# Patient Record
Sex: FEMALE | Race: WHITE | Employment: OTHER
[De-identification: names, ages, dates, MRNs, and addresses within clinical notes are randomized per-mention and may not be internally consistent; named-entity substitution may affect disease eponyms.]

---

## 2017-01-23 ENCOUNTER — OFFICE VISIT (OUTPATIENT)
Dept: PODIATRY | Facility: CLINIC | Age: 72
End: 2017-01-23

## 2017-01-23 VITALS
HEIGHT: 62 IN | BODY MASS INDEX: 27.42 KG/M2 | DIASTOLIC BLOOD PRESSURE: 69 MMHG | HEART RATE: 77 BPM | RESPIRATION RATE: 17 BRPM | WEIGHT: 149 LBS | SYSTOLIC BLOOD PRESSURE: 121 MMHG

## 2017-01-23 DIAGNOSIS — B35.1 DERMATOPHYTOSIS OF NAIL: ICD-10-CM

## 2017-01-23 DIAGNOSIS — I73.9 PVD (PERIPHERAL VASCULAR DISEASE) (HCC): ICD-10-CM

## 2017-01-23 DIAGNOSIS — I89.0 LYMPHEDEMA: ICD-10-CM

## 2017-01-23 DIAGNOSIS — F79 MENTAL RETARDATION: Primary | Chronic | ICD-10-CM

## 2017-01-23 PROCEDURE — 99213 OFFICE O/P EST LOW 20 MIN: CPT | Performed by: PODIATRIST

## 2017-01-23 PROCEDURE — 11721 DEBRIDE NAIL 6 OR MORE: CPT | Performed by: PODIATRIST

## 2017-02-16 RX ORDER — DIVALPROEX SODIUM 250 MG/1
TABLET, EXTENDED RELEASE ORAL
Qty: 28 TABLET | Refills: 0 | Status: SHIPPED | OUTPATIENT
Start: 2017-02-16 | End: 2017-02-24 | Stop reason: SDUPTHER

## 2017-02-17 ENCOUNTER — OFFICE VISIT (OUTPATIENT)
Dept: FAMILY MEDICINE CLINIC | Facility: CLINIC | Age: 72
End: 2017-02-17

## 2017-02-17 VITALS
DIASTOLIC BLOOD PRESSURE: 68 MMHG | HEART RATE: 58 BPM | SYSTOLIC BLOOD PRESSURE: 128 MMHG | WEIGHT: 125 LBS | BODY MASS INDEX: 22.86 KG/M2 | OXYGEN SATURATION: 98 %

## 2017-02-17 DIAGNOSIS — G40.909 SEIZURE DISORDER (HCC): Primary | Chronic | ICD-10-CM

## 2017-02-17 DIAGNOSIS — R63.4 WEIGHT LOSS: ICD-10-CM

## 2017-02-17 DIAGNOSIS — M15.9 PRIMARY OSTEOARTHRITIS INVOLVING MULTIPLE JOINTS: ICD-10-CM

## 2017-02-17 PROCEDURE — 99213 OFFICE O/P EST LOW 20 MIN: CPT | Performed by: FAMILY MEDICINE

## 2017-02-17 ASSESSMENT — ENCOUNTER SYMPTOMS
BACK PAIN: 0
VOMITING: 0
DIARRHEA: 0
COUGH: 0
SHORTNESS OF BREATH: 0
SORE THROAT: 0
SINUS PRESSURE: 0
NAUSEA: 0

## 2017-02-24 RX ORDER — BUMETANIDE 1 MG/1
TABLET ORAL
Qty: 30 TABLET | Refills: 11 | Status: SHIPPED | OUTPATIENT
Start: 2017-02-24 | End: 2018-01-25 | Stop reason: SDUPTHER

## 2017-03-02 RX ORDER — DIVALPROEX SODIUM 250 MG/1
TABLET, EXTENDED RELEASE ORAL
Qty: 28 TABLET | Refills: 0 | Status: SHIPPED | OUTPATIENT
Start: 2017-03-02 | End: 2017-03-27 | Stop reason: SDUPTHER

## 2017-03-02 RX ORDER — DIVALPROEX SODIUM 500 MG/1
TABLET, EXTENDED RELEASE ORAL
Qty: 112 TABLET | Refills: 0 | Status: SHIPPED | OUTPATIENT
Start: 2017-03-02 | End: 2017-03-27 | Stop reason: SDUPTHER

## 2017-03-27 RX ORDER — NAPROXEN 500 MG/1
TABLET ORAL
Qty: 60 TABLET | Refills: 0 | Status: SHIPPED | OUTPATIENT
Start: 2017-03-27 | End: 2017-03-29 | Stop reason: SDUPTHER

## 2017-03-27 RX ORDER — DIVALPROEX SODIUM 250 MG/1
TABLET, EXTENDED RELEASE ORAL
Qty: 28 TABLET | Refills: 0 | Status: SHIPPED | OUTPATIENT
Start: 2017-03-27 | End: 2017-04-22 | Stop reason: SDUPTHER

## 2017-03-27 RX ORDER — ALENDRONATE SODIUM 70 MG/1
TABLET ORAL
Qty: 4 TABLET | Refills: 2 | Status: SHIPPED | OUTPATIENT
Start: 2017-03-27 | End: 2017-06-16 | Stop reason: SDUPTHER

## 2017-03-27 RX ORDER — DIVALPROEX SODIUM 500 MG/1
TABLET, EXTENDED RELEASE ORAL
Qty: 112 TABLET | Refills: 0 | Status: SHIPPED | OUTPATIENT
Start: 2017-03-27 | End: 2017-04-22 | Stop reason: SDUPTHER

## 2017-03-29 RX ORDER — NAPROXEN 500 MG/1
TABLET ORAL
Qty: 60 TABLET | Refills: 3 | Status: SHIPPED | OUTPATIENT
Start: 2017-03-29 | End: 2017-08-29 | Stop reason: SDUPTHER

## 2017-04-04 ENCOUNTER — HOSPITAL ENCOUNTER (OUTPATIENT)
Age: 72
Discharge: HOME | End: 2017-04-04
Payer: MEDICAID

## 2017-04-04 ENCOUNTER — OFFICE VISIT (OUTPATIENT)
Dept: NEUROLOGY | Age: 72
End: 2017-04-04
Payer: MEDICAID

## 2017-04-04 VITALS — HEART RATE: 78 BPM | DIASTOLIC BLOOD PRESSURE: 70 MMHG | SYSTOLIC BLOOD PRESSURE: 150 MMHG

## 2017-04-04 DIAGNOSIS — R63.4 WEIGHT LOSS: ICD-10-CM

## 2017-04-04 DIAGNOSIS — G40.909 SEIZURE DISORDER (HCC): Chronic | ICD-10-CM

## 2017-04-04 DIAGNOSIS — G40.909 SEIZURE DISORDER (HCC): Primary | Chronic | ICD-10-CM

## 2017-04-04 LAB
ABSOLUTE EOS #: 0 K/UL (ref 0–0.4)
ABSOLUTE LYMPH #: 1.46 K/UL (ref 1–4.8)
ABSOLUTE MONO #: 0.22 K/UL (ref 0.2–0.8)
ALBUMIN SERPL-MCNC: 3.8 G/DL (ref 3.5–5.2)
ALBUMIN/GLOBULIN RATIO: ABNORMAL (ref 1–2.5)
ALP BLD-CCNC: 75 U/L (ref 35–104)
ALT SERPL-CCNC: 7 U/L (ref 5–33)
ANION GAP SERPL CALCULATED.3IONS-SCNC: 11 MMOL/L (ref 9–17)
AST SERPL-CCNC: 13 U/L
BASOPHILS # BLD: 0 % (ref 0–2)
BASOPHILS ABSOLUTE: 0 K/UL (ref 0–0.2)
BILIRUB SERPL-MCNC: 0.26 MG/DL (ref 0.3–1.2)
BUN BLDV-MCNC: 23 MG/DL (ref 8–23)
BUN/CREAT BLD: 38 (ref 9–20)
CALCIUM SERPL-MCNC: 8.8 MG/DL (ref 8.6–10.4)
CARBAMAZEPINE DATE LAST DOSE: NORMAL
CARBAMAZEPINE DOSE AMOUNT: NORMAL
CARBAMAZEPINE DOSE TIME: 800
CARBAMAZEPINE LEVEL: 9.5 UG/ML (ref 4–12)
CHLORIDE BLD-SCNC: 100 MMOL/L (ref 98–107)
CHOLESTEROL/HDL RATIO: 2.6
CHOLESTEROL: 200 MG/DL
CO2: 28 MMOL/L (ref 20–31)
CREAT SERPL-MCNC: 0.6 MG/DL (ref 0.5–0.9)
DIFFERENTIAL TYPE: ABNORMAL
EOSINOPHILS RELATIVE PERCENT: 0 % (ref 1–4)
GFR AFRICAN AMERICAN: >60 ML/MIN
GFR NON-AFRICAN AMERICAN: >60 ML/MIN
GFR SERPL CREATININE-BSD FRML MDRD: ABNORMAL ML/MIN/{1.73_M2}
GFR SERPL CREATININE-BSD FRML MDRD: ABNORMAL ML/MIN/{1.73_M2}
GLUCOSE BLD-MCNC: 70 MG/DL (ref 70–99)
HCT VFR BLD CALC: 38.2 % (ref 36–46)
HDLC SERPL-MCNC: 76 MG/DL
HEMOGLOBIN: 12.6 G/DL (ref 12–16)
LDL CHOLESTEROL: 101 MG/DL (ref 0–130)
LYMPHOCYTES # BLD: 52 % (ref 24–44)
MCH RBC QN AUTO: 32.2 PG (ref 26–34)
MCHC RBC AUTO-ENTMCNC: 33.1 G/DL (ref 31–37)
MCV RBC AUTO: 97.4 FL (ref 80–100)
MONOCYTES # BLD: 8 % (ref 1–7)
PDW BLD-RTO: 12.7 % (ref 11.5–14.5)
PLATELET # BLD: 131 K/UL (ref 130–400)
PLATELET ESTIMATE: ABNORMAL
PMV BLD AUTO: ABNORMAL FL (ref 6–12)
POTASSIUM SERPL-SCNC: 4 MMOL/L (ref 3.7–5.3)
RBC # BLD: 3.93 M/UL (ref 4–5.2)
RBC # BLD: ABNORMAL 10*6/UL
SEG NEUTROPHILS: 40 % (ref 36–66)
SEGMENTED NEUTROPHILS ABSOLUTE COUNT: 1.12 K/UL (ref 1.8–7.7)
SODIUM BLD-SCNC: 139 MMOL/L (ref 135–144)
T4 TOTAL: 4.1 UG/DL (ref 4.5–12)
TOTAL PROTEIN: 5.8 G/DL (ref 6.4–8.3)
TRIGL SERPL-MCNC: 117 MG/DL
TSH SERPL DL<=0.05 MIU/L-ACNC: 2.02 MIU/L (ref 0.3–5)
VLDLC SERPL CALC-MCNC: ABNORMAL MG/DL (ref 1–30)
WBC # BLD: 2.8 K/UL (ref 3.5–11)
WBC # BLD: ABNORMAL 10*3/UL

## 2017-04-04 PROCEDURE — 36415 COLL VENOUS BLD VENIPUNCTURE: CPT

## 2017-04-04 PROCEDURE — 80156 ASSAY CARBAMAZEPINE TOTAL: CPT

## 2017-04-04 PROCEDURE — 84436 ASSAY OF TOTAL THYROXINE: CPT

## 2017-04-04 PROCEDURE — 85025 COMPLETE CBC W/AUTO DIFF WBC: CPT

## 2017-04-04 PROCEDURE — 99214 OFFICE O/P EST MOD 30 MIN: CPT | Performed by: PSYCHIATRY & NEUROLOGY

## 2017-04-04 PROCEDURE — 84443 ASSAY THYROID STIM HORMONE: CPT

## 2017-04-04 PROCEDURE — 80053 COMPREHEN METABOLIC PANEL: CPT

## 2017-04-04 PROCEDURE — 80061 LIPID PANEL: CPT

## 2017-04-05 ENCOUNTER — OFFICE VISIT (OUTPATIENT)
Dept: PODIATRY | Age: 72
End: 2017-04-05
Payer: MEDICAID

## 2017-04-05 VITALS — BODY MASS INDEX: 27.42 KG/M2 | HEIGHT: 62 IN | WEIGHT: 149 LBS

## 2017-04-05 DIAGNOSIS — B35.1 DERMATOPHYTOSIS OF NAIL: ICD-10-CM

## 2017-04-05 DIAGNOSIS — F79 MENTAL RETARDATION: Primary | ICD-10-CM

## 2017-04-05 DIAGNOSIS — I73.9 PERIPHERAL VASCULAR DISEASE (HCC): ICD-10-CM

## 2017-04-05 DIAGNOSIS — I89.0 LYMPHEDEMA: ICD-10-CM

## 2017-04-05 PROCEDURE — 11721 DEBRIDE NAIL 6 OR MORE: CPT | Performed by: PODIATRIST

## 2017-04-24 RX ORDER — DIVALPROEX SODIUM 500 MG/1
TABLET, EXTENDED RELEASE ORAL
Qty: 336 TABLET | Refills: 3 | Status: SHIPPED | OUTPATIENT
Start: 2017-04-24 | End: 2018-03-22 | Stop reason: SDUPTHER

## 2017-04-24 RX ORDER — DIVALPROEX SODIUM 250 MG/1
TABLET, EXTENDED RELEASE ORAL
Qty: 84 TABLET | Refills: 3 | Status: SHIPPED | OUTPATIENT
Start: 2017-04-24 | End: 2018-03-22 | Stop reason: SDUPTHER

## 2017-04-24 RX ORDER — CARBAMAZEPINE 200 MG/1
CAPSULE, EXTENDED RELEASE ORAL
Qty: 672 CAPSULE | Refills: 3 | Status: SHIPPED | OUTPATIENT
Start: 2017-04-24 | End: 2018-04-18 | Stop reason: SDUPTHER

## 2017-05-12 ENCOUNTER — OFFICE VISIT (OUTPATIENT)
Dept: FAMILY MEDICINE CLINIC | Age: 72
End: 2017-05-12
Payer: MEDICAID

## 2017-05-12 VITALS — SYSTOLIC BLOOD PRESSURE: 120 MMHG | HEART RATE: 70 BPM | DIASTOLIC BLOOD PRESSURE: 64 MMHG

## 2017-05-12 DIAGNOSIS — G40.909 SEIZURE DISORDER (HCC): Primary | Chronic | ICD-10-CM

## 2017-05-12 DIAGNOSIS — F79 MENTAL RETARDATION: Chronic | ICD-10-CM

## 2017-05-12 DIAGNOSIS — M15.9 PRIMARY OSTEOARTHRITIS INVOLVING MULTIPLE JOINTS: ICD-10-CM

## 2017-05-12 PROCEDURE — 99213 OFFICE O/P EST LOW 20 MIN: CPT | Performed by: FAMILY MEDICINE

## 2017-05-12 ASSESSMENT — ENCOUNTER SYMPTOMS
SHORTNESS OF BREATH: 0
SORE THROAT: 0
DIARRHEA: 0
NAUSEA: 0
VOMITING: 0
COUGH: 0
BACK PAIN: 1
SINUS PRESSURE: 0

## 2017-06-19 RX ORDER — ALENDRONATE SODIUM 70 MG/1
TABLET ORAL
Qty: 4 TABLET | Refills: 0 | Status: SHIPPED | OUTPATIENT
Start: 2017-06-19 | End: 2017-07-17 | Stop reason: SDUPTHER

## 2017-07-17 RX ORDER — ALENDRONATE SODIUM 70 MG/1
TABLET ORAL
Qty: 4 TABLET | Refills: 3 | Status: SHIPPED | OUTPATIENT
Start: 2017-07-17 | End: 2017-11-02 | Stop reason: SDUPTHER

## 2017-07-26 ENCOUNTER — OFFICE VISIT (OUTPATIENT)
Dept: PODIATRY | Age: 72
End: 2017-07-26
Payer: MEDICAID

## 2017-07-26 VITALS — WEIGHT: 149 LBS | HEIGHT: 62 IN | BODY MASS INDEX: 27.42 KG/M2

## 2017-07-26 DIAGNOSIS — B35.1 DERMATOPHYTOSIS OF NAIL: ICD-10-CM

## 2017-07-26 DIAGNOSIS — F79 MENTAL RETARDATION: Primary | ICD-10-CM

## 2017-07-26 DIAGNOSIS — I89.0 LYMPHEDEMA: ICD-10-CM

## 2017-07-26 DIAGNOSIS — I73.9 PERIPHERAL VASCULAR DISEASE (HCC): ICD-10-CM

## 2017-07-26 PROCEDURE — 99213 OFFICE O/P EST LOW 20 MIN: CPT | Performed by: PODIATRIST

## 2017-07-26 PROCEDURE — 11721 DEBRIDE NAIL 6 OR MORE: CPT | Performed by: PODIATRIST

## 2017-08-08 ENCOUNTER — TELEPHONE (OUTPATIENT)
Dept: FAMILY MEDICINE CLINIC | Age: 72
End: 2017-08-08

## 2017-08-08 DIAGNOSIS — Z92.89 HISTORY OF MAMMOGRAM: Primary | ICD-10-CM

## 2017-08-08 DIAGNOSIS — Z12.31 SCREENING MAMMOGRAM, ENCOUNTER FOR: ICD-10-CM

## 2017-08-14 ENCOUNTER — HOSPITAL ENCOUNTER (OUTPATIENT)
Dept: MAMMOGRAPHY | Age: 72
Discharge: HOME | End: 2017-08-14
Payer: MEDICAID

## 2017-08-14 DIAGNOSIS — Z12.31 SCREENING MAMMOGRAM, ENCOUNTER FOR: ICD-10-CM

## 2017-08-14 DIAGNOSIS — Z92.89 HISTORY OF MAMMOGRAM: ICD-10-CM

## 2017-08-14 PROCEDURE — 77063 BREAST TOMOSYNTHESIS BI: CPT

## 2017-08-29 RX ORDER — NAPROXEN 500 MG/1
TABLET ORAL
Qty: 60 TABLET | Refills: 3 | Status: SHIPPED | OUTPATIENT
Start: 2017-08-29 | End: 2017-12-28 | Stop reason: SDUPTHER

## 2017-10-30 ENCOUNTER — OFFICE VISIT (OUTPATIENT)
Dept: PODIATRY | Age: 72
End: 2017-10-30
Payer: MEDICAID

## 2017-10-30 VITALS — WEIGHT: 149 LBS | BODY MASS INDEX: 27.42 KG/M2 | HEIGHT: 62 IN

## 2017-10-30 DIAGNOSIS — F79 MENTAL RETARDATION: Primary | ICD-10-CM

## 2017-10-30 DIAGNOSIS — I89.0 LYMPHEDEMA: ICD-10-CM

## 2017-10-30 DIAGNOSIS — I73.9 PERIPHERAL VASCULAR DISEASE (HCC): ICD-10-CM

## 2017-10-30 DIAGNOSIS — B35.1 DERMATOPHYTOSIS OF NAIL: ICD-10-CM

## 2017-10-30 PROCEDURE — 11721 DEBRIDE NAIL 6 OR MORE: CPT | Performed by: PODIATRIST

## 2017-10-30 PROCEDURE — 99213 OFFICE O/P EST LOW 20 MIN: CPT | Performed by: PODIATRIST

## 2017-11-02 RX ORDER — ALENDRONATE SODIUM 70 MG/1
TABLET ORAL
Qty: 4 TABLET | Refills: 3 | Status: SHIPPED | OUTPATIENT
Start: 2017-11-02 | End: 2018-02-21 | Stop reason: SDUPTHER

## 2017-11-21 ENCOUNTER — OFFICE VISIT (OUTPATIENT)
Dept: FAMILY MEDICINE CLINIC | Age: 72
End: 2017-11-21
Payer: MEDICAID

## 2017-11-21 VITALS — HEART RATE: 69 BPM | SYSTOLIC BLOOD PRESSURE: 124 MMHG | DIASTOLIC BLOOD PRESSURE: 70 MMHG

## 2017-11-21 DIAGNOSIS — Z23 IMMUNIZATION DUE: ICD-10-CM

## 2017-11-21 DIAGNOSIS — M15.9 PRIMARY OSTEOARTHRITIS INVOLVING MULTIPLE JOINTS: Primary | ICD-10-CM

## 2017-11-21 DIAGNOSIS — F41.9 ANXIETY: Chronic | ICD-10-CM

## 2017-11-21 PROCEDURE — 90688 IIV4 VACCINE SPLT 0.5 ML IM: CPT | Performed by: FAMILY MEDICINE

## 2017-11-21 PROCEDURE — 90472 IMMUNIZATION ADMIN EACH ADD: CPT | Performed by: FAMILY MEDICINE

## 2017-11-21 PROCEDURE — 90471 IMMUNIZATION ADMIN: CPT | Performed by: FAMILY MEDICINE

## 2017-11-21 PROCEDURE — 90732 PPSV23 VACC 2 YRS+ SUBQ/IM: CPT | Performed by: FAMILY MEDICINE

## 2017-11-21 PROCEDURE — 99213 OFFICE O/P EST LOW 20 MIN: CPT | Performed by: FAMILY MEDICINE

## 2017-11-21 ASSESSMENT — ENCOUNTER SYMPTOMS
DIARRHEA: 0
SORE THROAT: 0
SHORTNESS OF BREATH: 0
SINUS PRESSURE: 0
VOMITING: 0
NAUSEA: 0
BACK PAIN: 0
COUGH: 0

## 2017-11-21 ASSESSMENT — PATIENT HEALTH QUESTIONNAIRE - PHQ9
1. LITTLE INTEREST OR PLEASURE IN DOING THINGS: 0
SUM OF ALL RESPONSES TO PHQ9 QUESTIONS 1 & 2: 0
SUM OF ALL RESPONSES TO PHQ QUESTIONS 1-9: 0
2. FEELING DOWN, DEPRESSED OR HOPELESS: 0

## 2017-11-21 NOTE — PROGRESS NOTES
Jameel Hawk is a 71 y.o. female who presents today for her medical conditions/complaints as noted below. Jameel Hawk is c/o of Anxiety (follow up) and Health Maintenance (flu, pneumonia, colon,zostavax, adacel)    Routine follow up and update on immunizations. HPI:     Visit Information    Have you changed or started any medications since your last visit including any over-the-counter medicines, vitamins, or herbal medicines? no   Are you having any side effects from any of your medications? -  no  Have you stopped taking any of your medications? Is so, why? -  no    Have you seen any other physician or provider since your last visit? No  Have you had any other diagnostic tests since your last visit? No  Have you been seen in the emergency room and/or had an admission to a hospital since we last saw you? No  Have you had your routine dental cleaning in the past 6 months? yes -     Have you activated your [employer] account? If not, what are your barriers?  No:      Patient Care Team:  Richy Palumbo MD as PCP - General    Medical History Review  Past Medical, Family, and Social History reviewed and  contribute to the patient presenting condition    Health Maintenance   Topic Date Due    Hepatitis C screen  1945    DTaP/Tdap/Td vaccine (1 - Tdap) 06/05/1965    Zostavax vaccine  06/05/2006    Pneumococcal low/med risk (1 of 2 - PCV13) 06/05/2011    Flu vaccine (1) 09/01/2017    Colon Cancer Screen FIT/FOBT  09/02/2017    Breast cancer screen  08/14/2019    Lipid screen  04/04/2022    DEXA (modify frequency per FRAX score)  Completed       Past Medical History:   Diagnosis Date    Anxiety     Arthritis     Cataracts, bilateral     Mental retardation     Movement disorder     Primary osteoarthritis involving multiple joints 2/17/2017    [facility]umat[facility] ([facility] [address]     Seizures ([facility] [address]       Past Surgical History:   Procedure Laterality Date    HIP FRACTURE SURGERY Right 11/20/14     No family history on file. Social History   Substance Use Topics    Smoking status: Never Smoker    Smokeless tobacco: Never Used    Alcohol use No      Current Outpatient Prescriptions   Medication Sig Dispense Refill    alendronate (FOSAMAX) 70 MG tablet TAKE 1 TABLET EVERY WEEK 4 tablet 3    naproxen (NAPROSYN) 500 MG tablet TAKE 1 TABLET TWICE A DAY 60 tablet 3    divalproex (DEPAKOTE ER) 250 MG extended release tablet TAKE 1 TABLET IN THE EVENING 84 tablet 3    divalproex (DEPAKOTE ER) 500 MG extended release tablet TAKE 2 TABLETS TWICE A  tablet 3    CARBATROL 200 MG extended release capsule TAKE 4 CAPSULES TWICE A  capsule 3    bumetanide (BUMEX) 1 MG tablet TAKE 1 TABLET BY MOUTH DAILY 30 tablet 11     No current facility-administered medications for this visit. No Known Allergies      Subjective:   Review of Systems   Constitutional: Negative for chills, diaphoresis and fever. HENT: Negative for congestion, sinus pressure and sore throat. Eyes: Negative for visual disturbance. Respiratory: Negative for cough and shortness of breath. Cardiovascular: Negative for chest pain and leg swelling. Gastrointestinal: Negative for diarrhea, nausea and vomiting. Genitourinary: Negative for dysuria, menstrual problem, urgency and vaginal discharge. Musculoskeletal: Positive for arthralgias and gait problem. Negative for back pain and myalgias. Skin: Negative for rash. Neurological: Negative for weakness, numbness and headaches. Psychiatric/Behavioral: Positive for confusion. Negative for sleep disturbance. Objective:   /70   Pulse 69     Physical Exam   Constitutional: She is oriented to person, place, and time. She appears well-developed and well-nourished. No distress. HENT:   Head: Normocephalic and atraumatic. Mouth/Throat: No oropharyngeal exudate. Eyes: No scleral icterus. Neck: Neck supple. Carotid bruit is not present.    Cardiovascular: Exam reveals no gallop and no friction rub. No murmur heard. Pulmonary/Chest: No respiratory distress. She has no wheezes. She has no rales. She exhibits no tenderness. Musculoskeletal: She exhibits no edema. Lymphadenopathy:     She has no cervical adenopathy. Neurological: She is alert and oriented to person, place, and time. No cranial nerve deficit. Skin: No rash noted. She is not diaphoretic. Psychiatric: She has a normal mood and affect. Her behavior is normal. Judgment and thought content normal.       Assessment:      1. Primary osteoarthritis involving multiple joints     2. Immunization due  INFLUENZA, QUADV, 3 YRS AND OLDER, IM, MDV, 0.5ML (FLUZONE QUADV)    Pneumococcal polysaccharide vaccine 23-valent >= 3yo subcutaneous/IM (PNEUMOVAX 23)   3. Anxiety           Plan:      Return in about 6 months (around 5/21/2018). Orders Placed This Encounter   Procedures    INFLUENZA, QUADV, 3 YRS AND OLDER, IM, MDV, 0.5ML (FLUZONE QUADV)    Pneumococcal polysaccharide vaccine 23-valent >= 3yo subcutaneous/IM (PNEUMOVAX 23)     No orders of the defined types were placed in this encounter.

## 2017-12-28 RX ORDER — NAPROXEN 500 MG/1
TABLET ORAL
Qty: 60 TABLET | Refills: 11 | Status: ON HOLD | OUTPATIENT
Start: 2017-12-28 | End: 2018-07-20 | Stop reason: HOSPADM

## 2017-12-28 NOTE — TELEPHONE ENCOUNTER
Health Maintenance   Topic Date Due    Hepatitis C screen  1945    DTaP/Tdap/Td vaccine (1 - Tdap) 06/05/1965    Zostavax vaccine  06/05/2006    Colon Cancer Screen FIT/FOBT  09/02/2017    Pneumococcal low/med risk (2 of 2 - PCV13) 11/21/2018    Breast cancer screen  08/14/2019    Lipid screen  04/04/2022    DEXA (modify frequency per FRAX score)  Completed    Flu vaccine  Completed       No results found for: LABA1C          ( goal A1C is < 7)   No results found for: LABMICR  LDL Cholesterol (mg/dL)   Date Value   04/04/2017 101       (goal LDL is <100)   AST (U/L)   Date Value   04/04/2017 13     ALT (U/L)   Date Value   04/04/2017 7     BUN (mg/dL)   Date Value   04/04/2017 23     BP Readings from Last 3 Encounters:   11/21/17 124/70   05/12/17 120/64   04/04/17 (!) 150/70          (goal 120/80)    All Future Testing planned in [facility]  Lab Frequency Next Occurrence       Next Visit Date:  Future Appointments  Date Time Provider [facility]   1/4/2018 9:30 AM Kevin Daniel DPM Law Podiatry [facility]   5/2/2018 10:20 AM Reva Villagran MD Neuro Spec [facility]   5/23/2018 9:40 AM [facility] Beck [facility]            Patient Active Problem List:     Seizure disorder     Mental retardation     Anxiety     Hip fracture ([facility] [address]     Hypokalemia     Primary osteoarthritis involving multiple joints

## 2018-01-10 ENCOUNTER — TELEPHONE (OUTPATIENT)
Dept: FAMILY MEDICINE CLINIC | Age: 73
End: 2018-01-10

## 2018-01-10 NOTE — TELEPHONE ENCOUNTER
She has a  Productive cough with yellow phlegm and requesting something for cough at [address] [place] [address].

## 2018-01-22 ENCOUNTER — OFFICE VISIT (OUTPATIENT)
Dept: PODIATRY | Age: 73
End: 2018-01-22
Payer: MEDICAID

## 2018-01-22 VITALS
HEART RATE: 77 BPM | WEIGHT: 149 LBS | SYSTOLIC BLOOD PRESSURE: 127 MMHG | RESPIRATION RATE: 18 BRPM | HEIGHT: 62 IN | BODY MASS INDEX: 27.42 KG/M2 | DIASTOLIC BLOOD PRESSURE: 74 MMHG

## 2018-01-22 DIAGNOSIS — M79.604 BILATERAL LOWER EXTREMITY PAIN: ICD-10-CM

## 2018-01-22 DIAGNOSIS — M79.605 BILATERAL LOWER EXTREMITY PAIN: ICD-10-CM

## 2018-01-22 DIAGNOSIS — F79 MENTAL RETARDATION: Chronic | ICD-10-CM

## 2018-01-22 DIAGNOSIS — B35.1 DERMATOPHYTOSIS OF NAIL: Primary | ICD-10-CM

## 2018-01-22 DIAGNOSIS — I73.9 PVD (PERIPHERAL VASCULAR DISEASE) (HCC): ICD-10-CM

## 2018-01-22 PROCEDURE — 11721 DEBRIDE NAIL 6 OR MORE: CPT | Performed by: PODIATRIST

## 2018-01-22 NOTE — PROGRESS NOTES
Right                                        Left    Color  [x] [x] [x] [x] [x] [x] [x] [x] [x] [x]  5 4 3 2 1 1 2 3 4 5                          Right                                        Left    Incurvation/Ingrowin   [] [] [] [] [] [] [] [] [] []  5 4 3 2 1 1 2 3 4 5                         Right                                        Left    Inflammation/Pain   [x] [x] [x] [x] [x] [x] [x] [x] [x] [x]  5 4 3  2 1 1 2 3 4 5                         Right                                        Left       Nails are painful 1-10 with direct palpation. Dermatologic Exam:  Skin lesion/ulceration Absent . Skin No rashes or nodules noted. .       Musculoskeletal:     1st MPJ ROM decreased, Bilateral.  Muscle strength 5/5, Bilateral.  Pain present upon palpation of toenails 1-5, Bilateral. decreased medial longitudinal arch, Bilateral.  Ankle ROM decreased,Bilateral.    Dorsally contracted digits present digits 2, Bilateral.     Vascular: DP and PT pulses palpable 1/4, Bilateral.  CFT <5 seconds, Bilateral.  Hair growth absent to the level of the digits, Bilateral.  Edema present, Bilateral.  Varicosities absent, Bilateral. Erythema absent, Bilateral        Integument: Warm, dry, supple, Bilateral.  Open lesion absent, Bilateral.  Interdigital maceration absent to web spaces 4, Bilateral.  Nails 1-5 left and 1-5 right thickened > 3.0 mm, dystrophic and crumbly, discolored with subungual debris. Fissures absent, Bilateral.   Neurological:  Sensation present to light touch to level of digits, Bilateral.    Assessment:  71 y.o. female with:   1. Dermatophytosis of nail  02836 - [place] DEBRIDEMENT OF NAILS, 6 OR MORE   2. Bilateral lower extremity pain  08592 - SD DEBRIDEMENT OF NAILS, 6 OR MORE   3. PVD (peripheral vascular disease) ([facility] [address]  74198 - SD DEBRIDEMENT OF NAILS, 6 OR MORE   4. Mental retardation  52078 - SD DEBRIDEMENT OF NAILS, 6 OR MORE         Plan:   Pt was evaluated and examined.  Patient was given personalized discharge instructions. Nails 1-10 were debrided in length and thickness sharply with a nail nipper and  without incident. Pt will follow up in 9 weeks or sooner if any problems arise. Diagnosis was discussed with the pt and all of their questions were answered in detail. Proper foot hygiene and care was discussed with the pt. Patient to check feet daily and contact the office with any questions/problems/concerns. Other comorbidity noted and will be managed by PCP. Pain waiver discussed with patient and confirmed.    1/22/2018      Electronically signed by Nadya Kemp DPM on 1/22/2018 at 9:44 AM  1/22/2018

## 2018-01-25 RX ORDER — BUMETANIDE 1 MG/1
TABLET ORAL
Qty: 30 TABLET | Refills: 11 | Status: SHIPPED | OUTPATIENT
Start: 2018-01-25 | End: 2019-01-23 | Stop reason: SDUPTHER

## 2018-01-25 NOTE — TELEPHONE ENCOUNTER
Health Maintenance   Topic Date Due    Hepatitis C screen  1945    DTaP/Tdap/Td vaccine (1 - Tdap) 06/05/1965    Zostavax vaccine  06/05/2006    Colon Cancer Screen FIT/FOBT  09/02/2017    Potassium monitoring  04/04/2018    Creatinine monitoring  04/04/2018    Pneumococcal low/med risk (2 of 2 - PCV13) 11/21/2018    Breast cancer screen  08/14/2019    Lipid screen  04/04/2022    DEXA (modify frequency per FRAX score)  Completed    Flu vaccine  Completed       No results found for: LABA1C          ( goal A1C is < 7)   No results found for: LABMICR  LDL Cholesterol (mg/dL)   Date Value   04/04/2017 101       (goal LDL is <100)   AST (U/L)   Date Value   04/04/2017 13     ALT (U/L)   Date Value   04/04/2017 7     BUN (mg/dL)   Date Value   04/04/2017 23     BP Readings from Last 3 Encounters:   01/22/18 127/74   11/21/17 124/70   05/12/17 120/64          (goal 120/80)    All Future Testing planned in [facility]  Lab Frequency Next Occurrence       Next Visit Date:  Future Appointments  Date Time Provider Jv [facility]   4/24/2018 9:00 AM Radha To DPM Law Podiatry [facility]   5/2/2018 10:20 AM Tad Lama MD Neuro Spec [facility]   5/23/2018 9:40 AM [facility]            Patient Active Problem List:     Seizure disorder     Mental retardation     Anxiety     Hip fracture ([facility] [address]     Hypokalemia     Primary osteoarthritis involving multiple joints

## 2018-02-21 RX ORDER — ALENDRONATE SODIUM 70 MG/1
TABLET ORAL
Qty: 4 TABLET | Refills: 5 | Status: SHIPPED | OUTPATIENT
Start: 2018-02-21 | End: 2018-07-12 | Stop reason: SDUPTHER

## 2018-02-21 NOTE — TELEPHONE ENCOUNTER
Health Maintenance   Topic Date Due    Hepatitis C screen  1945    DTaP/Tdap/Td vaccine (1 - Tdap) 06/05/1965    Shingles Vaccine (1 of 2 - 2 Dose Series) 06/05/1996    Colon Cancer Screen FIT/FOBT  09/02/2017    Potassium monitoring  04/04/2018    Creatinine monitoring  04/04/2018    Pneumococcal low/med risk (2 of 2 - PCV13) 11/21/2018    Breast cancer screen  08/14/2019    Lipid screen  04/04/2022    DEXA (modify frequency per FRAX score)  Completed    Flu vaccine  Completed       No results found for: LABA1C          ( goal A1C is < 7)   No results found for: LABMICR  LDL Cholesterol (mg/dL)   Date Value   04/04/2017 101       (goal LDL is <100)   AST (U/L)   Date Value   04/04/2017 13     ALT (U/L)   Date Value   04/04/2017 7     BUN (mg/dL)   Date Value   04/04/2017 23     BP Readings from Last 3 Encounters:   01/22/18 127/74   11/21/17 124/70   05/12/17 120/64          (goal 120/80)    All Future Testing planned in [facility]  Lab Frequency Next Occurrence       Next Visit Date:  Future Appointments  Date Time Provider [facility]   4/24/2018 9:00 AM [facility]   5/2/2018 10:20 AM Elyse Solorio MD Neuro Spec [address] [place] [address]   5/23/2018 9:40 AM [facility]            Patient Active Problem List:     Seizure disorder     Mental retardation     Anxiety     Hip fracture ([facility] [address]     Hypokalemia     Primary osteoarthritis involving multiple joints

## 2018-03-22 RX ORDER — DIVALPROEX SODIUM 500 MG/1
TABLET, EXTENDED RELEASE ORAL
Qty: 336 TABLET | Refills: 0 | Status: SHIPPED | OUTPATIENT
Start: 2018-03-22 | End: 2018-04-02 | Stop reason: CLARIF

## 2018-03-22 RX ORDER — DIVALPROEX SODIUM 250 MG/1
TABLET, EXTENDED RELEASE ORAL
Qty: 84 TABLET | Refills: 0 | Status: SHIPPED | OUTPATIENT
Start: 2018-03-22 | End: 2018-07-17 | Stop reason: DRUGHIGH

## 2018-04-02 ENCOUNTER — OFFICE VISIT (OUTPATIENT)
Dept: FAMILY MEDICINE CLINIC | Age: 73
End: 2018-04-02
Payer: MEDICAID

## 2018-04-02 VITALS
TEMPERATURE: 98.5 F | HEART RATE: 93 BPM | SYSTOLIC BLOOD PRESSURE: 160 MMHG | OXYGEN SATURATION: 95 % | DIASTOLIC BLOOD PRESSURE: 74 MMHG

## 2018-04-02 DIAGNOSIS — J40 BRONCHITIS: Primary | ICD-10-CM

## 2018-04-02 PROCEDURE — 99213 OFFICE O/P EST LOW 20 MIN: CPT | Performed by: FAMILY MEDICINE

## 2018-04-02 RX ORDER — AZITHROMYCIN 250 MG/1
TABLET, FILM COATED ORAL
Qty: 1 PACKET | Refills: 0 | Status: SHIPPED | OUTPATIENT
Start: 2018-04-02 | End: 2018-05-02 | Stop reason: ALTCHOICE

## 2018-04-02 ASSESSMENT — ENCOUNTER SYMPTOMS
COUGH: 1
SHORTNESS OF BREATH: 0
SORE THROAT: 0
VOMITING: 0
NAUSEA: 0
BACK PAIN: 0
RHINORRHEA: 1
DIARRHEA: 0
SINUS PRESSURE: 0

## 2018-04-20 RX ORDER — CARBAMAZEPINE 200 MG/1
CAPSULE, EXTENDED RELEASE ORAL
Qty: 672 CAPSULE | Refills: 0 | Status: SHIPPED | OUTPATIENT
Start: 2018-04-20 | End: 2021-07-09

## 2018-04-24 ENCOUNTER — OFFICE VISIT (OUTPATIENT)
Dept: PODIATRY | Age: 73
End: 2018-04-24
Payer: MEDICAID

## 2018-04-24 VITALS — HEIGHT: 62 IN | WEIGHT: 149 LBS | BODY MASS INDEX: 27.42 KG/M2

## 2018-04-24 DIAGNOSIS — M79.675 PAIN OF TOES OF BOTH FEET: ICD-10-CM

## 2018-04-24 DIAGNOSIS — I73.9 PERIPHERAL VASCULAR DISEASE (HCC): ICD-10-CM

## 2018-04-24 DIAGNOSIS — F79 MENTAL RETARDATION: Primary | ICD-10-CM

## 2018-04-24 DIAGNOSIS — M79.674 PAIN OF TOES OF BOTH FEET: ICD-10-CM

## 2018-04-24 DIAGNOSIS — B35.1 DERMATOPHYTOSIS OF NAIL: ICD-10-CM

## 2018-04-24 PROCEDURE — 11721 DEBRIDE NAIL 6 OR MORE: CPT | Performed by: PODIATRIST

## 2018-04-24 PROCEDURE — 99213 OFFICE O/P EST LOW 20 MIN: CPT | Performed by: PODIATRIST

## 2018-04-24 RX ORDER — KETOCONAZOLE 20 MG/G
CREAM TOPICAL
Qty: 60 G | Refills: 3 | Status: SHIPPED | OUTPATIENT
Start: 2018-04-24 | End: 2019-11-07

## 2018-05-02 ENCOUNTER — OFFICE VISIT (OUTPATIENT)
Dept: NEUROLOGY | Age: 73
End: 2018-05-02
Payer: MEDICAID

## 2018-05-02 VITALS — HEART RATE: 75 BPM | DIASTOLIC BLOOD PRESSURE: 70 MMHG | SYSTOLIC BLOOD PRESSURE: 166 MMHG

## 2018-05-02 DIAGNOSIS — R29.898 WEAKNESS OF BOTH LOWER EXTREMITIES: ICD-10-CM

## 2018-05-02 DIAGNOSIS — F79 MENTAL RETARDATION: ICD-10-CM

## 2018-05-02 DIAGNOSIS — G40.909 SEIZURE DISORDER (HCC): Primary | ICD-10-CM

## 2018-05-02 PROCEDURE — 99215 OFFICE O/P EST HI 40 MIN: CPT | Performed by: PSYCHIATRY & NEUROLOGY

## 2018-05-02 RX ORDER — CARBAMAZEPINE 200 MG/1
CAPSULE, EXTENDED RELEASE ORAL
Qty: 240 CAPSULE | Refills: 11 | Status: SHIPPED | OUTPATIENT
Start: 2018-05-02 | End: 2018-05-23 | Stop reason: SDUPTHER

## 2018-05-02 RX ORDER — DIVALPROEX SODIUM 250 MG/1
TABLET, DELAYED RELEASE ORAL
Qty: 60 TABLET | Refills: 11 | Status: SHIPPED | OUTPATIENT
Start: 2018-05-02 | End: 2018-05-23 | Stop reason: SDUPTHER

## 2018-05-02 RX ORDER — DIVALPROEX SODIUM 500 MG/1
TABLET, DELAYED RELEASE ORAL
Qty: 120 TABLET | Refills: 11 | Status: SHIPPED | OUTPATIENT
Start: 2018-05-02 | End: 2019-05-07 | Stop reason: SDUPTHER

## 2018-05-02 ASSESSMENT — ENCOUNTER SYMPTOMS
RESPIRATORY NEGATIVE: 1
ALLERGIC/IMMUNOLOGIC NEGATIVE: 1
GASTROINTESTINAL NEGATIVE: 1
EYES NEGATIVE: 1

## 2018-05-23 ENCOUNTER — OFFICE VISIT (OUTPATIENT)
Dept: FAMILY MEDICINE CLINIC | Age: 73
End: 2018-05-23
Payer: MEDICAID

## 2018-05-23 VITALS — HEART RATE: 83 BPM | SYSTOLIC BLOOD PRESSURE: 130 MMHG | DIASTOLIC BLOOD PRESSURE: 82 MMHG

## 2018-05-23 DIAGNOSIS — Z51.81 MEDICATION MONITORING ENCOUNTER: ICD-10-CM

## 2018-05-23 DIAGNOSIS — M15.9 PRIMARY OSTEOARTHRITIS INVOLVING MULTIPLE JOINTS: Primary | ICD-10-CM

## 2018-05-23 DIAGNOSIS — G40.909 SEIZURE DISORDER (HCC): Chronic | ICD-10-CM

## 2018-05-23 PROCEDURE — 99213 OFFICE O/P EST LOW 20 MIN: CPT | Performed by: FAMILY MEDICINE

## 2018-05-23 ASSESSMENT — ENCOUNTER SYMPTOMS
SHORTNESS OF BREATH: 0
DIARRHEA: 0
BACK PAIN: 0
SORE THROAT: 0
VOMITING: 0
SINUS PRESSURE: 0
COUGH: 0
NAUSEA: 0

## 2018-07-10 ENCOUNTER — OFFICE VISIT (OUTPATIENT)
Dept: PODIATRY | Age: 73
End: 2018-07-10
Payer: MEDICAID

## 2018-07-10 VITALS — BODY MASS INDEX: 27.42 KG/M2 | WEIGHT: 149 LBS | HEIGHT: 62 IN

## 2018-07-10 DIAGNOSIS — M79.674 PAIN OF TOES OF BOTH FEET: Primary | ICD-10-CM

## 2018-07-10 DIAGNOSIS — B35.1 DERMATOPHYTOSIS OF NAIL: ICD-10-CM

## 2018-07-10 DIAGNOSIS — M79.675 PAIN OF TOES OF BOTH FEET: Primary | ICD-10-CM

## 2018-07-10 DIAGNOSIS — F79 MENTAL RETARDATION: ICD-10-CM

## 2018-07-10 DIAGNOSIS — I73.9 PERIPHERAL VASCULAR DISEASE (HCC): ICD-10-CM

## 2018-07-10 PROCEDURE — 11721 DEBRIDE NAIL 6 OR MORE: CPT | Performed by: PODIATRIST

## 2018-07-10 NOTE — PROGRESS NOTES
[facility]  [facility] [address]  [address] [place] [address]  Dept: [phone]  Dept Fax: [phone]    NAIL PAIN PROGRESS NOTE  Date of patient's visit: 7/10/2018  Patient's Name:  Marcia Sams YOB: 1945            Patient Care Team:  Stewart Campbell MD as PCP - Marilin Mock MD as PCP - S Attributed Provider  Mercy Meyer DPM as Physician (Podiatry)      Chief Complaint   Patient presents with    Foot Pain    Nail Problem     [place]       Subjective: This Marcia Sams comes to clinic for foot and nail care. Pt currently has complaint of thickened, painful, elongated nails that he/she cannot manage by themselves. Pt. Relates pain to nails with shoe gear. Pt's primary care physician is Stewart Campbell MD last seen 11/21/17. Past Medical History:   Diagnosis Date    Anxiety     Arthritis     Cataracts, bilateral     Mental retardation     Movement disorder     Primary osteoarthritis involving multiple joints 2/17/2017    Rheumatoid arthritis ([facility] [address]     Seizures (HCC)        No Known Allergies  Current Outpatient Prescriptions on File Prior to Visit   Medication Sig Dispense Refill    divalproex (DEPAKOTE) 500 MG DR tablet Take 2 po bid. Total 1250 mg po bid 120 tablet 11    ketoconazole (NIZORAL) 2 % cream Apply topically daily. 60 g 3    CARBATROL 200 MG extended release capsule TAKE 4 CAPSULES TWICE A  capsule 0    divalproex (DEPAKOTE ER) 250 MG extended release tablet TAKE 1 TABLET IN THE EVENING 84 tablet 0    alendronate (FOSAMAX) 70 MG tablet TAKE 1 TABLET EVERY WEEK 4 tablet 5    bumetanide (BUMEX) 1 MG tablet TAKE 1 TABLET BY MOUTH DAILY 30 tablet 11    naproxen (NAPROSYN) 500 MG tablet TAKE 1 TABLET TWICE A DAY 60 tablet 11     No current facility-administered medications on file prior to visit. Review of Systems.   Review of Systems - History obtained from chart review and the patient  General ROS: decreased,Bilateral.    Dorsally contracted digits present digits 2, Bilateral.     Vascular: DP and PT pulses palpable 1/4, Bilateral.  CFT <5 seconds, Bilateral.  Hair growth absent to the level of the digits, Bilateral.  Edema present, Bilateral.  Varicosities absent, Bilateral. Erythema absent, Bilateral    Integument: Warm, dry, supple, Bilateral.  Open lesion absent, Bilateral.  Interdigital maceration absent to web spaces 4, Bilateral.  Nails 1-5 left and 1-5 right thickened > 3.0 mm, dystrophic and crumbly, discolored with subungual debris. Fissures absent, Bilateral.   Neurological:  Sensation present to light touch to level of digits, Bilateral.      Assessment:  68 y.o. female with:    Diagnosis Orders   1. Pain of toes of both feet  94322 - [place] DEBRIDEMENT OF NAILS, 6 OR MORE   2. Mental retardation  24904 - UT DEBRIDEMENT OF NAILS, 6 OR MORE   3. Peripheral vascular disease (HCC)  77409 - [place] DEBRIDEMENT OF NAILS, 6 OR MORE   4. Dermatophytosis of nail  94941 - [place] DEBRIDEMENT OF NAILS, 6 OR MORE         Plan:   Pt was evaluated and examined. Patient was given personalized discharge instructions. Nails 1-10 were debrided in length and thickness sharply with a nail nipper and  without incident. Pt will follow up in 9 weeks or sooner if any problems arise. Diagnosis was discussed with the pt and all of their questions were answered in detail. Proper foot hygiene and care was discussed with the pt. Patient to check feet daily and contact the office with any questions/problems/concerns. Other comorbidity noted and will be managed by PCP. Pain waiver discussed with patient and confirmed.    7/10/2018      Electronically signed by Edouard Bateman DPM on 7/10/2018 at 9:26 AM  7/10/2018

## 2018-07-12 RX ORDER — ALENDRONATE SODIUM 70 MG/1
TABLET ORAL
Qty: 4 TABLET | Refills: 5 | Status: SHIPPED | OUTPATIENT
Start: 2018-07-12 | End: 2020-08-31 | Stop reason: SDUPTHER

## 2018-07-17 ENCOUNTER — HOSPITAL ENCOUNTER (INPATIENT)
Age: 73
LOS: 4 days | Discharge: NURSING, REHAB OR LONG-TERM CARE | DRG: 313 | End: 2018-07-21
Attending: EMERGENCY MEDICINE | Admitting: INTERNAL MEDICINE
Payer: MEDICAID

## 2018-07-17 ENCOUNTER — APPOINTMENT (OUTPATIENT)
Dept: GENERAL RADIOLOGY | Age: 73
DRG: 313 | End: 2018-07-17
Payer: MEDICAID

## 2018-07-17 DIAGNOSIS — S82.91XA CLOSED FRACTURE OF RIGHT LOWER LEG, INITIAL ENCOUNTER: Primary | ICD-10-CM

## 2018-07-17 PROBLEM — S82.291A CLOSED RIGHT TIBIAL FRACTURE, CLOSED, INITIAL ENCOUNTER: Status: ACTIVE | Noted: 2018-07-17

## 2018-07-17 PROBLEM — M80.00XD AGE-RELATED OSTEOPOROSIS WITH CURRENT PATHOLOGICAL FRACTURE WITH ROUTINE HEALING: Status: ACTIVE | Noted: 2018-07-17

## 2018-07-17 PROBLEM — K59.00 CONSTIPATION: Chronic | Status: ACTIVE | Noted: 2018-07-17

## 2018-07-17 LAB
ABSOLUTE EOS #: 0.1 K/UL (ref 0–0.4)
ABSOLUTE IMMATURE GRANULOCYTE: ABNORMAL K/UL (ref 0–0.3)
ABSOLUTE LYMPH #: 0.8 K/UL (ref 1–4.8)
ABSOLUTE MONO #: 0.4 K/UL (ref 0.2–0.8)
ANION GAP SERPL CALCULATED.3IONS-SCNC: 12 MMOL/L (ref 9–17)
BASOPHILS # BLD: 1 % (ref 0–2)
BASOPHILS ABSOLUTE: 0 K/UL (ref 0–0.2)
BUN BLDV-MCNC: 24 MG/DL (ref 8–23)
BUN/CREAT BLD: 48 (ref 9–20)
CALCIUM SERPL-MCNC: 9.1 MG/DL (ref 8.6–10.4)
CHLORIDE BLD-SCNC: 104 MMOL/L (ref 98–107)
CO2: 29 MMOL/L (ref 20–31)
CREAT SERPL-MCNC: 0.5 MG/DL (ref 0.5–0.9)
DIFFERENTIAL TYPE: ABNORMAL
EKG ATRIAL RATE: 91 BPM
EKG P AXIS: 57 DEGREES
EKG P-R INTERVAL: 110 MS
EKG Q-T INTERVAL: 368 MS
EKG QRS DURATION: 68 MS
EKG QTC CALCULATION (BAZETT): 452 MS
EKG R AXIS: 33 DEGREES
EKG T AXIS: 56 DEGREES
EKG VENTRICULAR RATE: 91 BPM
EOSINOPHILS RELATIVE PERCENT: 2 % (ref 1–4)
GFR AFRICAN AMERICAN: >60 ML/MIN
GFR NON-AFRICAN AMERICAN: >60 ML/MIN
GFR SERPL CREATININE-BSD FRML MDRD: ABNORMAL ML/MIN/{1.73_M2}
GFR SERPL CREATININE-BSD FRML MDRD: ABNORMAL ML/MIN/{1.73_M2}
GLUCOSE BLD-MCNC: 85 MG/DL (ref 70–99)
HCT VFR BLD CALC: 35.5 % (ref 36–46)
HEMOGLOBIN: 11.9 G/DL (ref 12–16)
IMMATURE GRANULOCYTES: ABNORMAL %
LYMPHOCYTES # BLD: 18 % (ref 24–44)
MCH RBC QN AUTO: 32.9 PG (ref 26–34)
MCHC RBC AUTO-ENTMCNC: 33.5 G/DL (ref 31–37)
MCV RBC AUTO: 98.1 FL (ref 80–100)
MONOCYTES # BLD: 10 % (ref 1–7)
NRBC AUTOMATED: ABNORMAL PER 100 WBC
PDW BLD-RTO: 13.4 % (ref 11.5–14.5)
PLATELET # BLD: 193 K/UL (ref 130–400)
PLATELET ESTIMATE: ABNORMAL
PMV BLD AUTO: 9.9 FL (ref 6–12)
POTASSIUM SERPL-SCNC: 4.1 MMOL/L (ref 3.7–5.3)
RBC # BLD: 3.62 M/UL (ref 4–5.2)
RBC # BLD: ABNORMAL 10*6/UL
SEG NEUTROPHILS: 69 % (ref 36–66)
SEGMENTED NEUTROPHILS ABSOLUTE COUNT: 3.1 K/UL (ref 1.8–7.7)
SODIUM BLD-SCNC: 145 MMOL/L (ref 135–144)
VITAMIN D 25-HYDROXY: 18.2 NG/ML (ref 30–100)
WBC # BLD: 4.5 K/UL (ref 3.5–11)
WBC # BLD: ABNORMAL 10*3/UL

## 2018-07-17 PROCEDURE — 99222 1ST HOSP IP/OBS MODERATE 55: CPT | Performed by: INTERNAL MEDICINE

## 2018-07-17 PROCEDURE — 82306 VITAMIN D 25 HYDROXY: CPT

## 2018-07-17 PROCEDURE — 73610 X-RAY EXAM OF ANKLE: CPT

## 2018-07-17 PROCEDURE — 85025 COMPLETE CBC W/AUTO DIFF WBC: CPT

## 2018-07-17 PROCEDURE — 36415 COLL VENOUS BLD VENIPUNCTURE: CPT

## 2018-07-17 PROCEDURE — 2580000003 HC RX 258: Performed by: INTERNAL MEDICINE

## 2018-07-17 PROCEDURE — 1200000000 HC SEMI PRIVATE

## 2018-07-17 PROCEDURE — 99285 EMERGENCY DEPT VISIT HI MDM: CPT

## 2018-07-17 PROCEDURE — 6370000000 HC RX 637 (ALT 250 FOR IP): Performed by: NURSE PRACTITIONER

## 2018-07-17 PROCEDURE — 93971 EXTREMITY STUDY: CPT

## 2018-07-17 PROCEDURE — 80048 BASIC METABOLIC PNL TOTAL CA: CPT

## 2018-07-17 PROCEDURE — 73560 X-RAY EXAM OF KNEE 1 OR 2: CPT

## 2018-07-17 PROCEDURE — 71045 X-RAY EXAM CHEST 1 VIEW: CPT

## 2018-07-17 PROCEDURE — 93005 ELECTROCARDIOGRAM TRACING: CPT

## 2018-07-17 PROCEDURE — 6370000000 HC RX 637 (ALT 250 FOR IP): Performed by: INTERNAL MEDICINE

## 2018-07-17 PROCEDURE — 73590 X-RAY EXAM OF LOWER LEG: CPT

## 2018-07-17 RX ORDER — POTASSIUM CHLORIDE 20MEQ/15ML
40 LIQUID (ML) ORAL PRN
Status: DISCONTINUED | OUTPATIENT
Start: 2018-07-17 | End: 2018-07-21 | Stop reason: HOSPADM

## 2018-07-17 RX ORDER — DIVALPROEX SODIUM 250 MG/1
250 TABLET, EXTENDED RELEASE ORAL 2 TIMES DAILY
Status: DISCONTINUED | OUTPATIENT
Start: 2018-07-17 | End: 2018-07-18 | Stop reason: SDUPTHER

## 2018-07-17 RX ORDER — DEXTROSE, SODIUM CHLORIDE, SODIUM LACTATE, POTASSIUM CHLORIDE, AND CALCIUM CHLORIDE 5; .6; .31; .03; .02 G/100ML; G/100ML; G/100ML; G/100ML; G/100ML
INJECTION, SOLUTION INTRAVENOUS CONTINUOUS
Status: DISCONTINUED | OUTPATIENT
Start: 2018-07-18 | End: 2018-07-19

## 2018-07-17 RX ORDER — OXYCODONE HYDROCHLORIDE AND ACETAMINOPHEN 5; 325 MG/1; MG/1
1 TABLET ORAL EVERY 4 HOURS PRN
Status: DISCONTINUED | OUTPATIENT
Start: 2018-07-17 | End: 2018-07-21 | Stop reason: HOSPADM

## 2018-07-17 RX ORDER — DIVALPROEX SODIUM 500 MG/1
1000 TABLET, DELAYED RELEASE ORAL EVERY 12 HOURS SCHEDULED
Status: DISCONTINUED | OUTPATIENT
Start: 2018-07-17 | End: 2018-07-21 | Stop reason: HOSPADM

## 2018-07-17 RX ORDER — ONDANSETRON 2 MG/ML
4 INJECTION INTRAMUSCULAR; INTRAVENOUS EVERY 6 HOURS PRN
Status: DISCONTINUED | OUTPATIENT
Start: 2018-07-17 | End: 2018-07-21 | Stop reason: HOSPADM

## 2018-07-17 RX ORDER — ONDANSETRON 4 MG/1
4 TABLET, ORALLY DISINTEGRATING ORAL EVERY 6 HOURS PRN
Status: DISCONTINUED | OUTPATIENT
Start: 2018-07-17 | End: 2018-07-21 | Stop reason: HOSPADM

## 2018-07-17 RX ORDER — MORPHINE SULFATE 2 MG/ML
2 INJECTION, SOLUTION INTRAMUSCULAR; INTRAVENOUS
Status: DISCONTINUED | OUTPATIENT
Start: 2018-07-17 | End: 2018-07-21 | Stop reason: HOSPADM

## 2018-07-17 RX ORDER — MORPHINE SULFATE 4 MG/ML
4 INJECTION, SOLUTION INTRAMUSCULAR; INTRAVENOUS
Status: DISCONTINUED | OUTPATIENT
Start: 2018-07-17 | End: 2018-07-21 | Stop reason: HOSPADM

## 2018-07-17 RX ORDER — BUMETANIDE 1 MG/1
1 TABLET ORAL DAILY
Status: DISCONTINUED | OUTPATIENT
Start: 2018-07-18 | End: 2018-07-21 | Stop reason: HOSPADM

## 2018-07-17 RX ORDER — MAGNESIUM SULFATE 1 G/100ML
1 INJECTION INTRAVENOUS PRN
Status: DISCONTINUED | OUTPATIENT
Start: 2018-07-17 | End: 2018-07-21 | Stop reason: HOSPADM

## 2018-07-17 RX ORDER — BISACODYL 10 MG
10 SUPPOSITORY, RECTAL RECTAL DAILY PRN
Status: DISCONTINUED | OUTPATIENT
Start: 2018-07-17 | End: 2018-07-21 | Stop reason: HOSPADM

## 2018-07-17 RX ORDER — SODIUM CHLORIDE 0.9 % (FLUSH) 0.9 %
10 SYRINGE (ML) INJECTION PRN
Status: DISCONTINUED | OUTPATIENT
Start: 2018-07-17 | End: 2018-07-21 | Stop reason: HOSPADM

## 2018-07-17 RX ORDER — POTASSIUM CHLORIDE 7.45 MG/ML
10 INJECTION INTRAVENOUS PRN
Status: DISCONTINUED | OUTPATIENT
Start: 2018-07-17 | End: 2018-07-21 | Stop reason: HOSPADM

## 2018-07-17 RX ORDER — CARBAMAZEPINE 100 MG/1
800 TABLET, EXTENDED RELEASE ORAL 2 TIMES DAILY
Status: DISCONTINUED | OUTPATIENT
Start: 2018-07-17 | End: 2018-07-21 | Stop reason: HOSPADM

## 2018-07-17 RX ORDER — DIVALPROEX SODIUM 250 MG/1
250 TABLET, EXTENDED RELEASE ORAL 2 TIMES DAILY
COMMUNITY
End: 2020-09-09 | Stop reason: SDUPTHER

## 2018-07-17 RX ORDER — NICOTINE 21 MG/24HR
1 PATCH, TRANSDERMAL 24 HOURS TRANSDERMAL DAILY PRN
Status: DISCONTINUED | OUTPATIENT
Start: 2018-07-17 | End: 2018-07-17

## 2018-07-17 RX ORDER — OXYCODONE HYDROCHLORIDE AND ACETAMINOPHEN 5; 325 MG/1; MG/1
2 TABLET ORAL EVERY 4 HOURS PRN
Status: DISCONTINUED | OUTPATIENT
Start: 2018-07-17 | End: 2018-07-21 | Stop reason: HOSPADM

## 2018-07-17 RX ORDER — KETOCONAZOLE 20 MG/G
CREAM TOPICAL 2 TIMES DAILY
Status: DISCONTINUED | OUTPATIENT
Start: 2018-07-17 | End: 2018-07-21 | Stop reason: HOSPADM

## 2018-07-17 RX ORDER — POTASSIUM CHLORIDE 20 MEQ/1
40 TABLET, EXTENDED RELEASE ORAL PRN
Status: DISCONTINUED | OUTPATIENT
Start: 2018-07-17 | End: 2018-07-21 | Stop reason: HOSPADM

## 2018-07-17 RX ORDER — SODIUM CHLORIDE 0.9 % (FLUSH) 0.9 %
10 SYRINGE (ML) INJECTION EVERY 12 HOURS SCHEDULED
Status: DISCONTINUED | OUTPATIENT
Start: 2018-07-17 | End: 2018-07-21 | Stop reason: HOSPADM

## 2018-07-17 RX ORDER — ONDANSETRON 2 MG/ML
4 INJECTION INTRAMUSCULAR; INTRAVENOUS EVERY 6 HOURS PRN
Status: DISCONTINUED | OUTPATIENT
Start: 2018-07-17 | End: 2018-07-17

## 2018-07-17 RX ADMIN — OXYCODONE HYDROCHLORIDE AND ACETAMINOPHEN 1 TABLET: 5; 325 TABLET ORAL at 21:59

## 2018-07-17 RX ADMIN — DIVALPROEX SODIUM 1000 MG: 500 TABLET, DELAYED RELEASE ORAL at 21:43

## 2018-07-17 RX ADMIN — Medication 10 ML: at 21:43

## 2018-07-17 RX ADMIN — DIVALPROEX SODIUM 250 MG: 250 TABLET, EXTENDED RELEASE ORAL at 21:43

## 2018-07-17 RX ADMIN — CARBAMAZEPINE 800 MG: 100 TABLET, EXTENDED RELEASE ORAL at 21:43

## 2018-07-17 ASSESSMENT — PAIN DESCRIPTION - PROGRESSION: CLINICAL_PROGRESSION: NOT CHANGED

## 2018-07-17 ASSESSMENT — ENCOUNTER SYMPTOMS
CONSTIPATION: 0
COUGH: 0
FACIAL SWELLING: 0
SHORTNESS OF BREATH: 0
ABDOMINAL PAIN: 0
VOMITING: 0
COLOR CHANGE: 0
EYE DISCHARGE: 0
DIARRHEA: 0
EYE REDNESS: 0

## 2018-07-17 ASSESSMENT — PAIN DESCRIPTION - LOCATION
LOCATION: LEG
LOCATION: LEG

## 2018-07-17 ASSESSMENT — PAIN DESCRIPTION - PAIN TYPE
TYPE: ACUTE PAIN
TYPE: ACUTE PAIN

## 2018-07-17 ASSESSMENT — PAIN DESCRIPTION - DESCRIPTORS
DESCRIPTORS: ACHING
DESCRIPTORS: ACHING

## 2018-07-17 ASSESSMENT — PAIN SCALES - GENERAL: PAINLEVEL_OUTOF10: 0

## 2018-07-17 ASSESSMENT — PAIN DESCRIPTION - ORIENTATION
ORIENTATION: RIGHT;LOWER
ORIENTATION: RIGHT;LOWER

## 2018-07-17 ASSESSMENT — PAIN DESCRIPTION - FREQUENCY: FREQUENCY: CONTINUOUS

## 2018-07-17 ASSESSMENT — PAIN DESCRIPTION - ONSET: ONSET: ON-GOING

## 2018-07-17 NOTE — CONSULTS
Kervin Elias      CC/Reason for consult: Right distal tib/fib fracture    HPI:      The patient is a 68 y.o. female with PMH of MRDD presenting to [facility] due to increasing swelling and bruising to the right. Per reports, patient bumped the leg approximately 6 days ago. After the injury the patient was noted to continue to walk on the leg. Per her care giver she noted that she had increasing pain, swelling and bruising to the leg prompting evaluation. There is no history of injury to the leg in the past.    Past Medical History:    Past Medical History:   Diagnosis Date    Anxiety     Arthritis     Cataracts, bilateral     Fracture (healed) treatment follow-up 2015    hand fx post fall    Mental retardation     Movement disorder     Primary osteoarthritis involving multiple joints 2/17/2017    Rheumatoid arthritis ([facility] [address]     Seizures ([facility] [address]      Past Surgical History:    Past Surgical History:   Procedure Laterality Date    HIP FRACTURE SURGERY Right 11/20/14     Medications Prior to Admission:   Prior to Admission medications    Medication Sig Start Date End Date Taking? Authorizing Provider   divalproex (DEPAKOTE ER) 250 MG extended release tablet Take 250 mg by mouth 2 times daily   Yes Historical Provider, MD   alendronate (FOSAMAX) 70 MG tablet TAKE 1 TABLET EVERY WEEK 7/12/18  Yes Newton Crump MD   divalproex (DEPAKOTE) 500 MG DR tablet Take 2 po bid. Total 1250 mg po bid 5/2/18  Yes Fabian Ingram MD   ketoconazole (NIZORAL) 2 % cream Apply topically daily. 4/24/18  Yes Gissell Gallo DPM   CARBATROL 200 MG extended release capsule TAKE 4 CAPSULES TWICE A DAY 4/20/18  Yes Fabian Ingram MD   bumetanide (BUMEX) 1 MG tablet TAKE 1 TABLET BY MOUTH DAILY 1/25/18  Yes Ayaka Malagon MD   naproxen (NAPROSYN) 500 MG tablet TAKE 1 TABLET TWICE A DAY 12/28/17  Yes Ayaka Malagon MD     Allergies:    Patient has no known allergies.     Social History:

## 2018-07-17 NOTE — ED NOTES
Patient denies discomfort placed short leg splint to the right leg. Dr Michele Gan examined.       Anna Armenta RN  07/17/18 [id number]

## 2018-07-17 NOTE — H&P
[facility][1]    HISTORY AND PHYSICAL EXAMINATION            Date:   7/17/2018  Patient name:  Richard Archibald  Date of admission:  7/17/2018  9:59 AM  MRN:   [id number]  Account:  [de-identified]  YOB: 1945  PCP:    Lela Choudhary MD  Room:   [place] [address]  Code Status:    Prior    Chief Complaint:     Chief Complaint   Patient presents with    Leg Pain     lower rt leg pain / bumped it 3 days ago       History Obtained From:   care giver and previous documentation      History of Present Illness:     Richard Archibald is a 68 y.o. female who presents to the emergency department For bruising to her right lower leg. Approximate 4 days ago she bumped it getting into a car. She's been walking on it but the bruising is been getting worse and she was not complaining of pain but she was brought in to be evaluated because of all the bruising. The patient has MR and is not able to provide a good history. Past Medical History:     Past Medical History:   Diagnosis Date    Anxiety     Arthritis     Cataracts, bilateral     Fracture (healed) treatment follow-up 2015    hand fx post fall    Mental retardation     Movement disorder     Primary osteoarthritis involving multiple joints 2/17/2017    [facility]     Seizures ([facility]         Past Surgical History:     Past Surgical History:   Procedure Laterality Date    HIP FRACTURE SURGERY Right 11/20/14        Medications Prior to Admission:     Prior to Admission medications    Medication Sig Start Date End Date Taking? Authorizing Provider   divalproex (DEPAKOTE ER) 250 MG extended release tablet Take 250 mg by mouth 2 times daily   Yes Historical Provider, MD   alendronate (FOSAMAX) 70 MG tablet TAKE 1 TABLET EVERY WEEK 7/12/18  Yes Newton Crump MD   divalproex (DEPAKOTE) 500 MG DR tablet Take 2 po bid.  Total 1250 mg po bid 5/2/18  Yes Zaynab Foster MD   ketoconazole (NIZORAL) 2 % cream Apply topically daily. 4/24/18  Yes Marcos Slade DPM   CARBATROL 200 MG extended release capsule TAKE 4 CAPSULES TWICE A DAY 4/20/18  Yes Harmeet Valero MD   bumetanide (BUMEX) 1 MG tablet TAKE 1 TABLET BY MOUTH DAILY 1/25/18  Yes Daylin Blair MD   naproxen (NAPROSYN) 500 MG tablet TAKE 1 TABLET TWICE A DAY 12/28/17  Yes Daylin Blair MD        Allergies:     Patient has no known allergies. Social History:     Tobacco:    reports that she has never smoked. She has never used smokeless tobacco.  Alcohol:      reports that she does not drink alcohol. Drug Use:  reports that she does not use drugs. Family History:     Family History   Problem Relation Age of Onset    Family history unknown: Yes   the patient has a history of mental retardation and unable to answer     Review of Systems:     Positive and Negative as described in HPI. All questions answered with the patient and her care giver at bedside    CONSTITUTIONAL:  for fevers, chills, sweats, fatigue, weight loss  HEENT:  negative for vision, hearing changes, runny nose, throat pain  RESPIRATORY:  negative for shortness of breath, cough, congestion, wheezing. CARDIOVASCULAR:  negative for chest pain, palpitations, chronic lower extremity edema  GASTROINTESTINAL:  negative for nausea, vomiting, diarrhea, constipation, change in bowel habits, abdominal pain   GENITOURINARY:  negative for difficulty of urination, burning with urination, frequency   INTEGUMENT:  negative for rash, skin lesions, easy bruising   HEMATOLOGIC/LYMPHATIC:   Chronic bilateral lower leg edema left>right. ALLERGIC/IMMUNOLOGIC:  negative for urticaria , itching  ENDOCRINE:  negative increase in drinking, increase in urination, hot or cold intolerance  MUSCULOSKELETAL:  negative joint pains or muscle aches.   NEUROLOGICAL:  negative for headaches, dizziness, lightheadedness, numbness, pain, tingling extremities  BEHAVIOR/PSYCH:  negative for MPV 9.9 6.0 - 12.0 fL    NRBC Automated NOT REPORTED per 100 WBC    Differential Type NOT REPORTED     Seg Neutrophils 69 (H) 36 - 66 %    Lymphocytes 18 (L) 24 - 44 %    Monocytes 10 (H) 1 - 7 %    Eosinophils % 2 1 - 4 %    Basophils 1 0 - 2 %    Immature Granulocytes NOT REPORTED 0 %    Segs Absolute 3.10 1.8 - 7.7 k/uL    Absolute Lymph # 0.80 (L) 1.0 - 4.8 k/uL    Absolute Mono # 0.40 0.2 - 0.8 k/uL    Absolute Eos # 0.10 0.0 - 0.4 k/uL    Basophils # 0.00 0.0 - 0.2 k/uL    Absolute Immature Granulocyte NOT REPORTED 0.00 - 0.30 k/uL    WBC Morphology NOT REPORTED     RBC Morphology NOT REPORTED     Platelet Estimate NOT REPORTED    Basic Metabolic Panel    Collection Time: 07/17/18 11:20 AM   Result Value Ref Range    Glucose 85 70 - 99 mg/dL    BUN 24 (H) 8 - 23 mg/dL    CREATININE 0.50 0.50 - 0.90 mg/dL    Bun/Cre Ratio 48 (H) 9 - 20    Calcium 9.1 8.6 - 10.4 mg/dL    Sodium 145 (H) 135 - 144 mmol/L    Potassium 4.1 3.7 - 5.3 mmol/L    Chloride 104 98 - 107 mmol/L    CO2 29 20 - 31 mmol/L    Anion Gap 12 9 - 17 mmol/L    GFR Non-African American >60 >60 mL/min    GFR African American >60 >60 mL/min    GFR Comment          GFR Staging NOT REPORTED    EKG 12 Lead    Collection Time: 07/17/18 11:29 AM   Result Value Ref Range    Ventricular Rate 91 BPM    Atrial Rate 91 BPM    P-R Interval 110 ms    QRS Duration 68 ms    Q-T Interval 368 ms    QTc Calculation (Bazett) 452 ms    P Axis 57 degrees    R Axis 33 degrees    T Axis 56 degrees       Imaging/Diagnostics:   Comminuted fractures of the distal tibia and fibula.       Osteopenia.       Subtle irregularity of the fibular neck on one view only.  If the patient has   localized pain dedicated views of the knee are recommended for follow-up.              Assessment :      Primary Problem  Closed right tibial fracture, closed, initial encounter    Active Hospital Problems    Diagnosis Date Noted    Constipation [K59.00] 07/17/2018     Priority: Low     Class:

## 2018-07-17 NOTE — PLAN OF CARE
Problem: Falls - Risk of:  Goal: Will remain free from falls  Will remain free from falls   Outcome: Ongoing      Problem: Safety:  Goal: Free from accidental physical injury  Free from accidental physical injury  Outcome: Ongoing      Problem: Daily Care:  Goal: Daily care needs are met  Daily care needs are met  Outcome: Ongoing      Problem: Pain:  Goal: Patient's pain/discomfort is manageable  Patient's pain/discomfort is manageable  Outcome: Ongoing      Problem: Discharge Planning:  Goal: Patients continuum of care needs are met  Patients continuum of care needs are met  Outcome: Ongoing

## 2018-07-17 NOTE — ED PROVIDER NOTES
[address] [place] [address] ED  eMERGENCY dEPARTMENT eNCOUnter      Pt Name: Mary Lou Pathak  MRN: [id number]  [place] 1945  Date of evaluation: 7/17/2018  Provider: Godfrey Keenan MD    [address] [place] [address]       Chief Complaint   Patient presents with    Leg Pain     lower rt leg pain / bumped it 3 days ago         HISTORY OF PRESENT ILLNESS  (Location/Symptom, Timing/Onset, Context/Setting, Quality, Duration, Modifying Factors, Severity.)   Mary Lou Pathak is a 68 y.o. female who presents to the emergency department For bruising to her right lower leg. Approximate 4 days ago she bumped it getting into a car. She's been walking on it but the bruising is been getting worse and she was not complaining of pain but she was brought in to be evaluated because of all the bruising. The patient has MR and is not able to provide a good history. Nursing Notes were reviewed. ALLERGIES     Patient has no known allergies. CURRENT MEDICATIONS       Previous Medications    ALENDRONATE (FOSAMAX) 70 MG TABLET    TAKE 1 TABLET EVERY WEEK    BUMETANIDE (BUMEX) 1 MG TABLET    TAKE 1 TABLET BY MOUTH DAILY    CARBATROL 200 MG EXTENDED RELEASE CAPSULE    TAKE 4 CAPSULES TWICE A DAY    DIVALPROEX (DEPAKOTE ER) 250 MG EXTENDED RELEASE TABLET    TAKE 1 TABLET IN THE EVENING    DIVALPROEX (DEPAKOTE) 500 MG DR TABLET    Take 2 po bid. Total 1250 mg po bid    KETOCONAZOLE (NIZORAL) 2 % CREAM    Apply topically daily. NAPROXEN (NAPROSYN) 500 MG TABLET    TAKE 1 TABLET TWICE A DAY       PAST MEDICAL HISTORY         Diagnosis Date    Anxiety     Arthritis     Cataracts, bilateral     Mental retardation     Movement disorder     Primary osteoarthritis involving multiple joints 2/17/2017    [facility] arthritis ([facility] [address] [facility]     Seizures ([facility] [address]        SURGICAL HISTORY           Procedure Laterality Date    HIP FRACTURE SURGERY Right 11/20/14         FAMILY HISTORY     History reviewed. No pertinent family history.   No family status information on The skin is intact, no laceration or abrasion. Lymphadenopathy:     She has no cervical adenopathy. Neurological: She is alert and oriented to person, place, and time. Skin: Skin is warm and dry. No rash noted. She is not diaphoretic. No erythema. Psychiatric: She has a normal mood and affect. Her behavior is normal.   Vitals reviewed. DIAGNOSTIC RESULTS     EKG: All EKG's are interpreted by the Emergency Department Physician who either signs or Co-signs this chart in the absence of a cardiologist.    Not indicated    RADIOLOGY:   Non-plain film images such as CT, Ultrasound and MRI are read by the radiologist. Plain radiographic images are visualized and preliminarily interpreted by the emergency physician with the below findings:    Interpretation per the Radiologist below, if available at the time of this note:    Xr Tibia Fibula Right (2 Views)    Result Date: 7/17/2018  EXAMINATION: 3 XRAY VIEWS OF THE RIGHT ANKLE; 2 XRAY VIEWS OF THE RIGHT TIBIA AND FIBULA 7/17/2018 10:26 am COMPARISON: None. HISTORY: ORDERING SYSTEM PROVIDED HISTORY: Pain TECHNOLOGIST PROVIDED HISTORY: Reason for exam:->Pain Acuity: Acute Type of Exam: Initial FINDINGS: The bones are osteopenic. Small cluster of soft tissue calcifications in the medial calf could be vascular. Lateral views of the ankle are in oblique orientation. Comminuted distal tibial and fibular shaft fractures with mild apex lateral angulation. Approximately 1/2 shaft width lateral displacement of the distal tibial fragment. Mild degenerative changes of the ankle and knee. Soft tissue swelling about the ankle. Partially visualized deformities of the 4th and probably 2nd metatarsals compatible with old healed fractures. On the lateral view there is subtle irregularity of the fibular neck probably due to overlap.   Apparent slight depression of lateral tibial plateau could be projectional.  Recommend dedicated views of the knee for follow-up if the Oral   SpO2: 100%   Weight: 135 lb (61.2 kg)   Height: 5' (1.524 m)       No orders of the defined types were placed in this encounter. Medical Decision Making: Displaced tibia fracture is identified and she is being admitted. Lungs are discussed thoroughly with the caregiver and the patient. She states she is in no pain and does not want anything for pain. Splint applied and application was checked by me and found to be appropriate, she is neurovascularly intact. CONSULTS:  [address] [place] [address] CONSULT TO HOSPITALIST    PROCEDURES:  None    FINAL IMPRESSION      1. Closed fracture of right lower leg, initial encounter          DISPOSITION/PLAN   DISPOSITION Decision To Admit 07/17/2018 11:09:31 AM      PATIENT REFERRED TO:   No follow-up provider specified.     DISCHARGE MEDICATIONS:     New Prescriptions    No medications on file         (Please note that portions of this note were completed with a voice recognition program.  Efforts were made to edit the dictations but occasionally words are mis-transcribed.)    Lorenzo Rodas MD  Attending Emergency Physician             Lorenzo Rodas MD  07/17/18 0487

## 2018-07-17 NOTE — H&P
encounter    Active Hospital Problems    Diagnosis Date Noted    Constipation [K59.00] 07/17/2018     Priority: Low     Class: Chronic    Closed right tibial fracture, closed, initial encounter Christian Méndez 07/17/2018    Primary osteoarthritis involving multiple joints [M15.0] 02/17/2017    Seizure disorder [G40.909] 05/30/2012    Mental retardation [F79] 05/30/2012    Anxiety [F41.9] 05/30/2012       Plan:     Patient status Admit as inpatient in the  Med/Surge    1. orthopedic surgery consult-discussed with Dr. May Lomeli  2. Pain control  3. dvt prophylaxis  4. Acceptable risk for OR, proceed as scheduled  5. Resume home meds  6. Monitor for post-op complications  7. Pt/ot post-op; depending on how she does, may need snf    Consultations:   [facility] [address] [facility]     Patient is admitted as inpatient status because of co-morbidities listed above, severity of signs and symptoms as outlined, requirement for current medical therapies and most importantly because of direct risk to patient if care not provided in a hospital setting.     Chelsea Cobian DO  7/17/2018  3:34 PM    Copy sent to Dr. Ramana Dupree MD

## 2018-07-18 ENCOUNTER — ANESTHESIA (OUTPATIENT)
Dept: OPERATING ROOM | Age: 73
DRG: 313 | End: 2018-07-18
Payer: MEDICAID

## 2018-07-18 ENCOUNTER — APPOINTMENT (OUTPATIENT)
Dept: GENERAL RADIOLOGY | Age: 73
DRG: 313 | End: 2018-07-18
Payer: MEDICAID

## 2018-07-18 ENCOUNTER — ANESTHESIA EVENT (OUTPATIENT)
Dept: OPERATING ROOM | Age: 73
DRG: 313 | End: 2018-07-18
Payer: MEDICAID

## 2018-07-18 VITALS — DIASTOLIC BLOOD PRESSURE: 85 MMHG | SYSTOLIC BLOOD PRESSURE: 191 MMHG | OXYGEN SATURATION: 97 % | TEMPERATURE: 97.7 F

## 2018-07-18 LAB
ANION GAP SERPL CALCULATED.3IONS-SCNC: 9 MMOL/L (ref 9–17)
BUN BLDV-MCNC: 27 MG/DL (ref 8–23)
BUN/CREAT BLD: 47 (ref 9–20)
CALCIUM SERPL-MCNC: 8.2 MG/DL (ref 8.6–10.4)
CHLORIDE BLD-SCNC: 106 MMOL/L (ref 98–107)
CO2: 27 MMOL/L (ref 20–31)
CREAT SERPL-MCNC: 0.57 MG/DL (ref 0.5–0.9)
GFR AFRICAN AMERICAN: >60 ML/MIN
GFR NON-AFRICAN AMERICAN: >60 ML/MIN
GFR SERPL CREATININE-BSD FRML MDRD: ABNORMAL ML/MIN/{1.73_M2}
GFR SERPL CREATININE-BSD FRML MDRD: ABNORMAL ML/MIN/{1.73_M2}
GLUCOSE BLD-MCNC: 84 MG/DL (ref 70–99)
HCT VFR BLD CALC: 29.7 % (ref 36–46)
HEMOGLOBIN: 9.9 G/DL (ref 12–16)
MCH RBC QN AUTO: 32.6 PG (ref 26–34)
MCHC RBC AUTO-ENTMCNC: 33.3 G/DL (ref 31–37)
MCV RBC AUTO: 97.9 FL (ref 80–100)
NRBC AUTOMATED: ABNORMAL PER 100 WBC
PDW BLD-RTO: 13.4 % (ref 11.5–14.5)
PLATELET # BLD: 146 K/UL (ref 130–400)
PMV BLD AUTO: 9.2 FL (ref 6–12)
POTASSIUM SERPL-SCNC: 4.1 MMOL/L (ref 3.7–5.3)
RBC # BLD: 3.04 M/UL (ref 4–5.2)
SODIUM BLD-SCNC: 142 MMOL/L (ref 135–144)
WBC # BLD: 3.5 K/UL (ref 3.5–11)

## 2018-07-18 PROCEDURE — 85027 COMPLETE CBC AUTOMATED: CPT

## 2018-07-18 PROCEDURE — C1713 ANCHOR/SCREW BN/BN,TIS/BN: HCPCS | Performed by: ORTHOPAEDIC SURGERY

## 2018-07-18 PROCEDURE — 80048 BASIC METABOLIC PNL TOTAL CA: CPT

## 2018-07-18 PROCEDURE — 71045 X-RAY EXAM CHEST 1 VIEW: CPT

## 2018-07-18 PROCEDURE — 3700000000 HC ANESTHESIA ATTENDED CARE: Performed by: ORTHOPAEDIC SURGERY

## 2018-07-18 PROCEDURE — 6360000002 HC RX W HCPCS: Performed by: ANESTHESIOLOGY

## 2018-07-18 PROCEDURE — 0QSG06Z REPOSITION RIGHT TIBIA WITH INTRAMEDULLARY INTERNAL FIXATION DEVICE, OPEN APPROACH: ICD-10-PCS | Performed by: ORTHOPAEDIC SURGERY

## 2018-07-18 PROCEDURE — 3600000013 HC SURGERY LEVEL 3 ADDTL 15MIN: Performed by: ORTHOPAEDIC SURGERY

## 2018-07-18 PROCEDURE — 6370000000 HC RX 637 (ALT 250 FOR IP): Performed by: INTERNAL MEDICINE

## 2018-07-18 PROCEDURE — 1200000000 HC SEMI PRIVATE

## 2018-07-18 PROCEDURE — 3700000001 HC ADD 15 MINUTES (ANESTHESIA): Performed by: ORTHOPAEDIC SURGERY

## 2018-07-18 PROCEDURE — 7100000001 HC PACU RECOVERY - ADDTL 15 MIN: Performed by: ORTHOPAEDIC SURGERY

## 2018-07-18 PROCEDURE — 6360000002 HC RX W HCPCS: Performed by: INTERNAL MEDICINE

## 2018-07-18 PROCEDURE — 99231 SBSQ HOSP IP/OBS SF/LOW 25: CPT | Performed by: INTERNAL MEDICINE

## 2018-07-18 PROCEDURE — 2500000003 HC RX 250 WO HCPCS: Performed by: NURSE ANESTHETIST, CERTIFIED REGISTERED

## 2018-07-18 PROCEDURE — 2580000003 HC RX 258: Performed by: INTERNAL MEDICINE

## 2018-07-18 PROCEDURE — 0BJ08ZZ INSPECTION OF TRACHEOBRONCHIAL TREE, VIA NATURAL OR ARTIFICIAL OPENING ENDOSCOPIC: ICD-10-PCS | Performed by: ORTHOPAEDIC SURGERY

## 2018-07-18 PROCEDURE — 6360000002 HC RX W HCPCS: Performed by: STUDENT IN AN ORGANIZED HEALTH CARE EDUCATION/TRAINING PROGRAM

## 2018-07-18 PROCEDURE — 2709999900 HC NON-CHARGEABLE SUPPLY: Performed by: ORTHOPAEDIC SURGERY

## 2018-07-18 PROCEDURE — 73590 X-RAY EXAM OF LOWER LEG: CPT

## 2018-07-18 PROCEDURE — 2580000003 HC RX 258: Performed by: NURSE ANESTHETIST, CERTIFIED REGISTERED

## 2018-07-18 PROCEDURE — 6360000002 HC RX W HCPCS: Performed by: NURSE ANESTHETIST, CERTIFIED REGISTERED

## 2018-07-18 PROCEDURE — 3600000003 HC SURGERY LEVEL 3 BASE: Performed by: ORTHOPAEDIC SURGERY

## 2018-07-18 PROCEDURE — 6370000000 HC RX 637 (ALT 250 FOR IP): Performed by: STUDENT IN AN ORGANIZED HEALTH CARE EDUCATION/TRAINING PROGRAM

## 2018-07-18 PROCEDURE — 2580000003 HC RX 258: Performed by: STUDENT IN AN ORGANIZED HEALTH CARE EDUCATION/TRAINING PROGRAM

## 2018-07-18 PROCEDURE — 6370000000 HC RX 637 (ALT 250 FOR IP): Performed by: NURSE PRACTITIONER

## 2018-07-18 PROCEDURE — 2720000010 HC SURG SUPPLY STERILE: Performed by: ORTHOPAEDIC SURGERY

## 2018-07-18 PROCEDURE — C1769 GUIDE WIRE: HCPCS | Performed by: ORTHOPAEDIC SURGERY

## 2018-07-18 PROCEDURE — 36415 COLL VENOUS BLD VENIPUNCTURE: CPT

## 2018-07-18 PROCEDURE — 7100000000 HC PACU RECOVERY - FIRST 15 MIN: Performed by: ORTHOPAEDIC SURGERY

## 2018-07-18 PROCEDURE — 3209999900 FLUORO FOR SURGICAL PROCEDURES

## 2018-07-18 DEVICE — SCREW BNE L32MM DIA4MM TIB BLU TI ST CANN LOK FULL THRD T25: Type: IMPLANTABLE DEVICE | Site: TIBIA | Status: FUNCTIONAL

## 2018-07-18 DEVICE — SCREW BNE L34MM DIA4MM NONSTERILE TIB BLU TI ST CANN LOK: Type: IMPLANTABLE DEVICE | Site: TIBIA | Status: FUNCTIONAL

## 2018-07-18 DEVICE — SCREW BNE L30MM DIA4MM TIB BLU TI ST CANN LOK FULL THRD T25: Type: IMPLANTABLE DEVICE | Site: TIBIA | Status: FUNCTIONAL

## 2018-07-18 RX ORDER — LIDOCAINE HYDROCHLORIDE 20 MG/ML
INJECTION, SOLUTION EPIDURAL; INFILTRATION; INTRACAUDAL; PERINEURAL PRN
Status: DISCONTINUED | OUTPATIENT
Start: 2018-07-18 | End: 2018-07-18 | Stop reason: SDUPTHER

## 2018-07-18 RX ORDER — HYDROMORPHONE HCL 110MG/55ML
0.5 PATIENT CONTROLLED ANALGESIA SYRINGE INTRAVENOUS EVERY 5 MIN PRN
Status: DISCONTINUED | OUTPATIENT
Start: 2018-07-18 | End: 2018-07-18 | Stop reason: HOSPADM

## 2018-07-18 RX ORDER — PHENYLEPHRINE HCL IN 0.9% NACL 0.5 MG/5ML
SYRINGE (ML) INTRAVENOUS PRN
Status: DISCONTINUED | OUTPATIENT
Start: 2018-07-18 | End: 2018-07-18 | Stop reason: SDUPTHER

## 2018-07-18 RX ORDER — SODIUM CHLORIDE, SODIUM LACTATE, POTASSIUM CHLORIDE, CALCIUM CHLORIDE 600; 310; 30; 20 MG/100ML; MG/100ML; MG/100ML; MG/100ML
INJECTION, SOLUTION INTRAVENOUS CONTINUOUS PRN
Status: DISCONTINUED | OUTPATIENT
Start: 2018-07-18 | End: 2018-07-18 | Stop reason: SDUPTHER

## 2018-07-18 RX ORDER — PROPOFOL 10 MG/ML
INJECTION, EMULSION INTRAVENOUS PRN
Status: DISCONTINUED | OUTPATIENT
Start: 2018-07-18 | End: 2018-07-18 | Stop reason: SDUPTHER

## 2018-07-18 RX ORDER — ONDANSETRON 2 MG/ML
4 INJECTION INTRAMUSCULAR; INTRAVENOUS
Status: DISCONTINUED | OUTPATIENT
Start: 2018-07-18 | End: 2018-07-18 | Stop reason: HOSPADM

## 2018-07-18 RX ORDER — ROCURONIUM BROMIDE 10 MG/ML
INJECTION, SOLUTION INTRAVENOUS PRN
Status: DISCONTINUED | OUTPATIENT
Start: 2018-07-18 | End: 2018-07-18 | Stop reason: SDUPTHER

## 2018-07-18 RX ORDER — ONDANSETRON 2 MG/ML
INJECTION INTRAMUSCULAR; INTRAVENOUS PRN
Status: DISCONTINUED | OUTPATIENT
Start: 2018-07-18 | End: 2018-07-18 | Stop reason: SDUPTHER

## 2018-07-18 RX ORDER — LABETALOL HYDROCHLORIDE 5 MG/ML
INJECTION, SOLUTION INTRAVENOUS PRN
Status: DISCONTINUED | OUTPATIENT
Start: 2018-07-18 | End: 2018-07-18 | Stop reason: SDUPTHER

## 2018-07-18 RX ORDER — FENTANYL CITRATE 50 UG/ML
25 INJECTION, SOLUTION INTRAMUSCULAR; INTRAVENOUS EVERY 5 MIN PRN
Status: DISCONTINUED | OUTPATIENT
Start: 2018-07-18 | End: 2018-07-18 | Stop reason: HOSPADM

## 2018-07-18 RX ORDER — FENTANYL CITRATE 50 UG/ML
50 INJECTION, SOLUTION INTRAMUSCULAR; INTRAVENOUS EVERY 5 MIN PRN
Status: DISCONTINUED | OUTPATIENT
Start: 2018-07-18 | End: 2018-07-18 | Stop reason: HOSPADM

## 2018-07-18 RX ORDER — MIDAZOLAM HYDROCHLORIDE 1 MG/ML
INJECTION INTRAMUSCULAR; INTRAVENOUS PRN
Status: DISCONTINUED | OUTPATIENT
Start: 2018-07-18 | End: 2018-07-18 | Stop reason: SDUPTHER

## 2018-07-18 RX ORDER — ACETAMINOPHEN 10 MG/ML
1000 INJECTION, SOLUTION INTRAVENOUS ONCE
Status: COMPLETED | OUTPATIENT
Start: 2018-07-18 | End: 2018-07-18

## 2018-07-18 RX ORDER — DIPHENHYDRAMINE HYDROCHLORIDE 50 MG/ML
INJECTION INTRAMUSCULAR; INTRAVENOUS PRN
Status: DISCONTINUED | OUTPATIENT
Start: 2018-07-18 | End: 2018-07-18 | Stop reason: SDUPTHER

## 2018-07-18 RX ORDER — DEXAMETHASONE SODIUM PHOSPHATE 10 MG/ML
INJECTION INTRAMUSCULAR; INTRAVENOUS PRN
Status: DISCONTINUED | OUTPATIENT
Start: 2018-07-18 | End: 2018-07-18 | Stop reason: SDUPTHER

## 2018-07-18 RX ORDER — HYDROMORPHONE HCL 110MG/55ML
0.25 PATIENT CONTROLLED ANALGESIA SYRINGE INTRAVENOUS EVERY 5 MIN PRN
Status: DISCONTINUED | OUTPATIENT
Start: 2018-07-18 | End: 2018-07-18 | Stop reason: HOSPADM

## 2018-07-18 RX ORDER — DIVALPROEX SODIUM 250 MG/1
250 TABLET, EXTENDED RELEASE ORAL 2 TIMES DAILY
Status: DISCONTINUED | OUTPATIENT
Start: 2018-07-18 | End: 2018-07-21 | Stop reason: HOSPADM

## 2018-07-18 RX ORDER — FENTANYL CITRATE 50 UG/ML
INJECTION, SOLUTION INTRAMUSCULAR; INTRAVENOUS PRN
Status: DISCONTINUED | OUTPATIENT
Start: 2018-07-18 | End: 2018-07-18 | Stop reason: SDUPTHER

## 2018-07-18 RX ADMIN — FENTANYL CITRATE 50 MCG: 50 INJECTION, SOLUTION INTRAMUSCULAR; INTRAVENOUS at 11:40

## 2018-07-18 RX ADMIN — FENTANYL CITRATE 50 MCG: 50 INJECTION, SOLUTION INTRAMUSCULAR; INTRAVENOUS at 12:00

## 2018-07-18 RX ADMIN — SUGAMMADEX 200 MG: 100 INJECTION, SOLUTION INTRAVENOUS at 13:11

## 2018-07-18 RX ADMIN — DIPHENHYDRAMINE HYDROCHLORIDE 25 MG: 50 INJECTION, SOLUTION INTRAMUSCULAR; INTRAVENOUS at 12:33

## 2018-07-18 RX ADMIN — Medication 100 MCG: at 12:20

## 2018-07-18 RX ADMIN — DIVALPROEX SODIUM 250 MG: 250 TABLET, EXTENDED RELEASE ORAL at 22:48

## 2018-07-18 RX ADMIN — SODIUM CHLORIDE, SODIUM LACTATE, POTASSIUM CHLORIDE, CALCIUM CHLORIDE AND DEXTROSE MONOHYDRATE: 5; 600; 310; 30; 20 INJECTION, SOLUTION INTRAVENOUS at 01:55

## 2018-07-18 RX ADMIN — BUMETANIDE 1 MG: 1 TABLET ORAL at 16:29

## 2018-07-18 RX ADMIN — EPINEPHRINE 10 MCG: 0.1 INJECTION, SOLUTION ENDOTRACHEAL; INTRACARDIAC; INTRAVENOUS at 12:43

## 2018-07-18 RX ADMIN — LABETALOL HYDROCHLORIDE 5 MG: 5 INJECTION, SOLUTION INTRAVENOUS at 13:20

## 2018-07-18 RX ADMIN — CARBAMAZEPINE 800 MG: 100 TABLET, EXTENDED RELEASE ORAL at 22:38

## 2018-07-18 RX ADMIN — SODIUM CHLORIDE, POTASSIUM CHLORIDE, SODIUM LACTATE AND CALCIUM CHLORIDE: 600; 310; 30; 20 INJECTION, SOLUTION INTRAVENOUS at 11:30

## 2018-07-18 RX ADMIN — SODIUM CHLORIDE, POTASSIUM CHLORIDE, SODIUM LACTATE AND CALCIUM CHLORIDE: 600; 310; 30; 20 INJECTION, SOLUTION INTRAVENOUS at 10:55

## 2018-07-18 RX ADMIN — LIDOCAINE HYDROCHLORIDE 100 MG: 20 INJECTION, SOLUTION EPIDURAL; INFILTRATION; INTRACAUDAL; PERINEURAL at 11:08

## 2018-07-18 RX ADMIN — DEXAMETHASONE SODIUM PHOSPHATE 10 MG: 10 INJECTION INTRAMUSCULAR; INTRAVENOUS at 11:47

## 2018-07-18 RX ADMIN — OXYCODONE HYDROCHLORIDE AND ACETAMINOPHEN 1 TABLET: 5; 325 TABLET ORAL at 01:54

## 2018-07-18 RX ADMIN — FENTANYL CITRATE 100 MCG: 50 INJECTION, SOLUTION INTRAMUSCULAR; INTRAVENOUS at 11:08

## 2018-07-18 RX ADMIN — Medication 100 MCG: at 11:25

## 2018-07-18 RX ADMIN — ONDANSETRON 4 MG: 2 INJECTION INTRAMUSCULAR; INTRAVENOUS at 12:33

## 2018-07-18 RX ADMIN — ROCURONIUM BROMIDE 50 MG: 10 INJECTION, SOLUTION INTRAVENOUS at 11:10

## 2018-07-18 RX ADMIN — Medication 10 ML: at 23:09

## 2018-07-18 RX ADMIN — MIDAZOLAM 1 MG: 1 INJECTION INTRAMUSCULAR; INTRAVENOUS at 11:08

## 2018-07-18 RX ADMIN — MORPHINE SULFATE 2 MG: 2 INJECTION, SOLUTION INTRAMUSCULAR; INTRAVENOUS at 19:15

## 2018-07-18 RX ADMIN — ROCURONIUM BROMIDE 10 MG: 10 INJECTION, SOLUTION INTRAVENOUS at 12:16

## 2018-07-18 RX ADMIN — DIVALPROEX SODIUM 1000 MG: 500 TABLET, DELAYED RELEASE ORAL at 22:38

## 2018-07-18 RX ADMIN — ROCURONIUM BROMIDE 10 MG: 10 INJECTION, SOLUTION INTRAVENOUS at 11:55

## 2018-07-18 RX ADMIN — CEFAZOLIN SODIUM 2 G: 2 SOLUTION INTRAVENOUS at 11:24

## 2018-07-18 RX ADMIN — ACETAMINOPHEN 1000 MG: 10 INJECTION, SOLUTION INTRAVENOUS at 14:12

## 2018-07-18 RX ADMIN — OXYCODONE HYDROCHLORIDE AND ACETAMINOPHEN 1 TABLET: 5; 325 TABLET ORAL at 22:37

## 2018-07-18 RX ADMIN — EPINEPHRINE 10 MCG: 0.1 INJECTION, SOLUTION ENDOTRACHEAL; INTRACARDIAC; INTRAVENOUS at 12:26

## 2018-07-18 RX ADMIN — PROPOFOL 150 MG: 10 INJECTION, EMULSION INTRAVENOUS at 11:08

## 2018-07-18 RX ADMIN — Medication 200 MCG: at 12:24

## 2018-07-18 RX ADMIN — SODIUM CHLORIDE, SODIUM LACTATE, POTASSIUM CHLORIDE, CALCIUM CHLORIDE AND DEXTROSE MONOHYDRATE: 5; 600; 310; 30; 20 INJECTION, SOLUTION INTRAVENOUS at 23:08

## 2018-07-18 RX ADMIN — ROCURONIUM BROMIDE 10 MG: 10 INJECTION, SOLUTION INTRAVENOUS at 12:10

## 2018-07-18 ASSESSMENT — PULMONARY FUNCTION TESTS
PIF_VALUE: 6
PIF_VALUE: 39
PIF_VALUE: 56
PIF_VALUE: 4
PIF_VALUE: 29
PIF_VALUE: 54
PIF_VALUE: 32
PIF_VALUE: 38
PIF_VALUE: 63
PIF_VALUE: 40
PIF_VALUE: 5
PIF_VALUE: 4
PIF_VALUE: 40
PIF_VALUE: 39
PIF_VALUE: 1
PIF_VALUE: 34
PIF_VALUE: 40
PIF_VALUE: 25
PIF_VALUE: 40
PIF_VALUE: 1
PIF_VALUE: 27
PIF_VALUE: 26
PIF_VALUE: 36
PIF_VALUE: 47
PIF_VALUE: 24
PIF_VALUE: 23
PIF_VALUE: 37
PIF_VALUE: 39
PIF_VALUE: 1
PIF_VALUE: 25
PIF_VALUE: 1
PIF_VALUE: 4
PIF_VALUE: 39
PIF_VALUE: 39
PIF_VALUE: 4
PIF_VALUE: 32
PIF_VALUE: 39
PIF_VALUE: 38
PIF_VALUE: 7
PIF_VALUE: 31
PIF_VALUE: 36
PIF_VALUE: 39
PIF_VALUE: 40
PIF_VALUE: 25
PIF_VALUE: 43
PIF_VALUE: 40
PIF_VALUE: 32
PIF_VALUE: 25
PIF_VALUE: 39
PIF_VALUE: 23
PIF_VALUE: 53
PIF_VALUE: 45
PIF_VALUE: 24
PIF_VALUE: 36
PIF_VALUE: 54
PIF_VALUE: 36
PIF_VALUE: 49
PIF_VALUE: 50
PIF_VALUE: 39
PIF_VALUE: 23
PIF_VALUE: 18
PIF_VALUE: 69
PIF_VALUE: 1
PIF_VALUE: 1
PIF_VALUE: 36
PIF_VALUE: 31
PIF_VALUE: 31
PIF_VALUE: 4
PIF_VALUE: 43
PIF_VALUE: 34
PIF_VALUE: 35
PIF_VALUE: 34
PIF_VALUE: 39
PIF_VALUE: 40
PIF_VALUE: 3
PIF_VALUE: 38
PIF_VALUE: 53
PIF_VALUE: 6
PIF_VALUE: 38
PIF_VALUE: 26
PIF_VALUE: 36
PIF_VALUE: 36
PIF_VALUE: 29
PIF_VALUE: 26
PIF_VALUE: 50
PIF_VALUE: 1
PIF_VALUE: 1
PIF_VALUE: 40
PIF_VALUE: 47
PIF_VALUE: 23
PIF_VALUE: 32
PIF_VALUE: 39
PIF_VALUE: 25
PIF_VALUE: 35
PIF_VALUE: 37
PIF_VALUE: 18
PIF_VALUE: 34
PIF_VALUE: 30
PIF_VALUE: 37
PIF_VALUE: 37
PIF_VALUE: 43
PIF_VALUE: 17
PIF_VALUE: 15
PIF_VALUE: 36
PIF_VALUE: 42
PIF_VALUE: 28
PIF_VALUE: 37
PIF_VALUE: 35
PIF_VALUE: 36
PIF_VALUE: 36
PIF_VALUE: 41
PIF_VALUE: 32
PIF_VALUE: 35
PIF_VALUE: 54
PIF_VALUE: 35
PIF_VALUE: 42
PIF_VALUE: 36
PIF_VALUE: 3
PIF_VALUE: 41
PIF_VALUE: 34
PIF_VALUE: 2
PIF_VALUE: 38
PIF_VALUE: 2
PIF_VALUE: 1
PIF_VALUE: 30
PIF_VALUE: 35
PIF_VALUE: 39
PIF_VALUE: 35
PIF_VALUE: 23
PIF_VALUE: 39
PIF_VALUE: 36
PIF_VALUE: 23
PIF_VALUE: 1
PIF_VALUE: 1
PIF_VALUE: 50
PIF_VALUE: 35
PIF_VALUE: 32
PIF_VALUE: 28
PIF_VALUE: 4
PIF_VALUE: 39
PIF_VALUE: 26
PIF_VALUE: 49
PIF_VALUE: 1
PIF_VALUE: 27
PIF_VALUE: 2
PIF_VALUE: 32
PIF_VALUE: 48
PIF_VALUE: 36
PIF_VALUE: 5
PIF_VALUE: 45
PIF_VALUE: 1
PIF_VALUE: 17

## 2018-07-18 ASSESSMENT — PAIN SCALES - GENERAL
PAINLEVEL_OUTOF10: 5
PAINLEVEL_OUTOF10: 4

## 2018-07-18 ASSESSMENT — PAIN DESCRIPTION - ORIENTATION
ORIENTATION: RIGHT
ORIENTATION: RIGHT
ORIENTATION: RIGHT;LOWER

## 2018-07-18 ASSESSMENT — PAIN DESCRIPTION - PROGRESSION
CLINICAL_PROGRESSION: NOT CHANGED

## 2018-07-18 ASSESSMENT — PAIN DESCRIPTION - LOCATION
LOCATION: LEG

## 2018-07-18 ASSESSMENT — PAIN SCALES - WONG BAKER
WONGBAKER_NUMERICALRESPONSE: 8
WONGBAKER_NUMERICALRESPONSE: 8
WONGBAKER_NUMERICALRESPONSE: 0
WONGBAKER_NUMERICALRESPONSE: 8
WONGBAKER_NUMERICALRESPONSE: 0

## 2018-07-18 ASSESSMENT — PAIN DESCRIPTION - ONSET
ONSET: ON-GOING
ONSET: ON-GOING

## 2018-07-18 ASSESSMENT — PAIN DESCRIPTION - DESCRIPTORS
DESCRIPTORS: ACHING
DESCRIPTORS: DISCOMFORT

## 2018-07-18 ASSESSMENT — PAIN DESCRIPTION - PAIN TYPE
TYPE: ACUTE PAIN
TYPE: SURGICAL PAIN
TYPE: ACUTE PAIN

## 2018-07-18 ASSESSMENT — PAIN DESCRIPTION - FREQUENCY
FREQUENCY: CONTINUOUS
FREQUENCY: INTERMITTENT

## 2018-07-18 NOTE — PROGRESS NOTES
Pt states that she is having pain in her right leg. Unable to state a number on the pain scale, but states it hurts a lot. Dr. Maxx Bruner at bedside to assess. Okay to administer ofirmev IV. Please see new orders     1320. Dr. Jaz Zhu at bedside to assess patient, order for chest x-ray at bedside. Dr. Maxx Bruner and Dr. Jaz Zhu discussed patient case at bedside. Per Dr. Jaz Zhu and Dr. Maxx Bruner, pt is okay to return to med-surg with telemetry and continuous pulse ox. Pt to have repeat chest xray in the morning. Dr. Jaz Zhu states he no longer needs to follow pt case. [address] [facility] Notified Dr. Maxx Bruner of pt pain. Pt states that her leg still hurts a lot. Dr. Maxx Bruner okay with titrating fentanyl 25mcg for pain.

## 2018-07-18 NOTE — PLAN OF CARE
Problem: Falls - Risk of:  Goal: Will remain free from falls  Will remain free from falls   Outcome: Ongoing  Siderails up x 2  Hourly rounding  Call light in reach  Instructed to call for assist before attempting out of bed. Remains free from falls and accidental injury at this time   Floor free from obstacles  Bed is locked and in lowest position  Adequate lighting provided  Bed alarm on, Red Falling star and Stay with Me signs posted  Pt is on strict bedrest at this time. Problem: Pain:  Goal: Patient's pain/discomfort is manageable  Patient's pain/discomfort is manageable   Outcome: Ongoing  Pain level assessment complete. Patient educated on pain scale and control interventions  PRN pain medication given per patient request  Patient instructed to call out with new onset of pain or unrelieved pain. RLE pain controlled with elevation, ice and prn percocets.

## 2018-07-18 NOTE — PLAN OF CARE
Problem: Falls - Risk of:  Goal: Will remain free from falls  Will remain free from falls   Outcome: Ongoing      Problem: Safety:  Goal: Free from accidental physical injury  Free from accidental physical injury   Outcome: Ongoing      Problem: Daily Care:  Goal: Daily care needs are met  Daily care needs are met   Outcome: Ongoing      Problem: Pain:  Goal: Patient's pain/discomfort is manageable  Patient's pain/discomfort is manageable   Outcome: Ongoing      Problem: Discharge Planning:  Goal: Patients continuum of care needs are met  Patients continuum of care needs are met   Outcome: Ongoing      Problem: Risk for Impaired Skin Integrity  Goal: Tissue integrity - skin and mucous membranes  Structural intactness and normal physiological function of skin and  mucous membranes.    Outcome: Ongoing

## 2018-07-18 NOTE — CARE COORDINATION
Case Management Initial Discharge Plan  Richard Hipps,         Readmission Risk              Risk of Unplanned Readmission:        11               Met with:patient and caregiver, Frisco Lemon, to discuss discharge plans. Information verified: address, contacts, phone number, , insurance Yes  PCP: Lela Choudhary MD  Date of last visit: a few months ago    Insurance Provider: Medicaid    Discharge Planning  Current Residence:  [facility]- [address] [place], [place] [address]burg:  Other (Comment) (with group members)   Home has 1 stories/ stairs to climb  Support Systems:  Other (Comment) (group home staff and residence)  Current Services PTA:   Agency:   Patient able to perform ADL's:Dependent  DME in home:    DME used to aid ambulation prior to admission:     DME used during admission:      Potential Assistance Needed:  N/A    Pharmacy: [facility]. Medications:  No  Does patient want to participate in local refill/ meds to beds program?  No    Patient agreeable to home care: Yes  Freedom of choice provided:  yes      Type of Home Care Services:  None  Patient expects to be discharged to:  return to group home     Prior SNF/Rehab Placement and Facility: [place] [address] to SNF/Rehab: Yes  [place] of choice provided: yes   Evaluation: yes    Expected Discharge date:  18  Follow Up Appointment: Best Day/ Time:      Transportation provider: group home  Transportation arrangements needed for discharge: Yes    Discharge Plan: patient had surgery today for tibia fracture. FRANCES met with pt's caregiver, Herminio Garcia, to discuss dc needs. Herminio Garcia states she has been her caregiver for over 20 years. Per Herminio Garcia, patient is her own decision maker and does not have a guardian. FRANCES discussed potential need for SNF at discharge, Herminio Garcia stated pt has been to [address] [place] [address], but would like to look for a different facility. SNF list provided.   Will need PT/OT recommendations for dc. Pt will need Level of Care/HENS at dc as medicaid is her primary payor.          Electronically signed by JIMENA Bucio on 7/18/18 at 5:01 PM

## 2018-07-18 NOTE — ANESTHESIA PRE PROCEDURE
day Jace Cannon Blood, DO   10 mL at 07/17/18 2143    sodium chloride flush 0.9 % injection 10 mL  10 mL Intravenous PRN Jonnathan P Blood, DO        potassium chloride (KLOR-CON M) extended release tablet 40 mEq  40 mEq Oral PRN Jonnathan P Blood, DO        Or    potassium chloride 20 MEQ/15ML (10%) oral solution 40 mEq  40 mEq Oral PRN Jonnathan P Blood, DO        Or    potassium chloride 10 mEq/100 mL IVPB (Peripheral Line)  10 mEq Intravenous PRN Jonnathan P Blood, DO        magnesium sulfate 1 g in dextrose 5% 100 mL IVPB  1 g Intravenous PRN Jonnathan P Blood, DO        magnesium hydroxide (MILK OF MAGNESIA) 400 MG/5ML suspension 30 mL  30 mL Oral Daily PRN Jonnathan P Blood, DO        bisacodyl (DULCOLAX) suppository 10 mg  10 mg Rectal Daily PRN Jonnathan P Blood, DO        enoxaparin (LOVENOX) injection 40 mg  40 mg Subcutaneous Daily Jonnathan P Blood, DO        morphine injection 2 mg  2 mg Intravenous Q2H PRN Jonnathan P Blood, DO        Or    morphine (PF) injection 4 mg  4 mg Intravenous Q2H PRN Jonnathan P Blood, DO        ondansetron (ZOFRAN-ODT) disintegrating tablet 4 mg  4 mg Oral Q6H PRN Jonnathan P Blood, DO        Or    ondansetron (ZOFRAN) injection 4 mg  4 mg Intravenous Q6H PRN Jonnathan P Blood, DO        ceFAZolin (ANCEF) 2 g in dextrose 3 % 50 mL IVPB (duplex)  2 g Intravenous On Call to [address] [place] [address], DO        dextrose 5 % in lactated ringers infusion   Intravenous Continuous Stu Miller,  mL/hr at 07/18/18 0155      oxyCODONE-acetaminophen (PERCOCET) 5-325 MG per tablet 1 tablet  1 tablet Oral Q4H PRN Rosalea Clamp, APRN - CNP   1 tablet at 07/18/18 0154    Or    oxyCODONE-acetaminophen (PERCOCET) 5-325 MG per tablet 2 tablet  2 tablet Oral Q4H PRN Rosalea Clamp, APRN - CNP           Allergies:  No Known Allergies    Problem List:    Patient Active Problem List   Diagnosis Code    Seizure disorder G40.909    Mental retardation F79    Anxiety F41.9    Hip Results   Component Value Date     07/18/2018    K 4.1 07/18/2018     07/18/2018    CO2 27 07/18/2018    BUN 27 07/18/2018    CREATININE 0.57 07/18/2018    GFRAA >60 07/18/2018    LABGLOM >60 07/18/2018    GLUCOSE 84 07/18/2018    GLUCOSE 68 03/09/2016    PROT 5.8 04/04/2017    PROT 5.6 03/09/2016    CALCIUM 8.2 07/18/2018    BILITOT 0.26 04/04/2017    ALKPHOS 75 04/04/2017    AST 13 04/04/2017    ALT 7 04/04/2017       POC Tests: No results for input(s): POCGLU, POCNA, POCK, POCCL, POCBUN, POCHEMO, POCHCT in the last 72 hours. Coags:   Lab Results   Component Value Date    PROTIME 11.4 09/02/2016    INR 1.1 09/02/2016    APTT 31.3 09/02/2016       HCG (If Applicable): No results found for: PREGTESTUR, PREGSERUM, HCG, HCGQUANT     ABGs: No results found for: PHART, PO2ART, GWF4CTO, EFT3PSX, BEART, S8RQTKMT     Type & Screen (If Applicable):  No results found for: LABABO, LABRH    Anesthesia Evaluation   no history of anesthetic complications:   Airway: Mallampati: I  TM distance: >3 FB   Neck ROM: full  Mouth opening: > = 3 FB Dental:    (+) upper dentures and lower dentures      Pulmonary: breath sounds clear to auscultation      (-) COPD, asthma, recent URI and sleep apnea                           Cardiovascular:        (-) pacemaker, past MI, CABG/stent, dysrhythmias,  angina and  PENG      Rhythm: regular  Rate: normal           Beta Blocker:  Not on Beta Blocker         Neuro/Psych:   (+) seizures:, depression/anxiety              ROS comment: Mental retardation GI/Hepatic/Renal: Neg GI/Hepatic/Renal ROS            Endo/Other:    (+) : arthritis: OA and rheumatoid. , .                 Abdominal:         (-) obese     Vascular:                                      Anesthesia Plan      general     ASA 3       Induction: intravenous. MIPS: Postoperative opioids intended and Prophylactic antiemetics administered. Anesthetic plan and risks discussed with patient.       Plan discussed with

## 2018-07-18 NOTE — ANESTHESIA POSTPROCEDURE EVALUATION
Department of Anesthesiology  Postprocedure Note    Patient: Loretta Millan  MRN: [id number]  YOB: 1945  Date of evaluation: 7/18/2018  Time:  5:17 PM     Procedure Summary     Date:  07/18/18 Room / Location:  [facility] / [facility]    Anesthesia Start:  1055 Anesthesia Stop:  2666    Procedure:  RIGHT TIBIA IM MARÍA -KATE KNEE TRIANGLES, INTRAMEDULLARY REAMERS, SYNTHES TIBIAL NAIL (Right Leg Lower) Diagnosis:  (IN PT)    Surgeon:  Jada Peters DO Responsible Provider:  Javier Kohli DO    Anesthesia Type:  general ASA Status:  3          Anesthesia Type: general    Trciia Phase I: Tricia Score: 9    Tricia Phase II:      Last vitals: Reviewed and per EMR flowsheets.        Anesthesia Post Evaluation    Patient location during evaluation: PACU  Patient participation: complete - patient participated  Level of consciousness: awake and alert  Airway patency: patent  Nausea & Vomiting: no nausea and no vomiting  Complications: no  Cardiovascular status: hemodynamically stable  Respiratory status: acceptable  Hydration status: stable  Comments: CXR in PACU shows resolution atelectasis on L with no pnuemo appreciated, patient d/c from PACU to monitored bed with repeat CXR in AM

## 2018-07-18 NOTE — PROGRESS NOTES
07/18/18   0721   WBC  4.5  3.5   RBC  3.62*  3.04*   HGB  11.9*  9.9*   HCT  35.5*  29.7*   MCV  98.1  97.9   MCH  32.9  32.6   MCHC  33.5  33.3   RDW  13.4  13.4   PLT  193  146   MPV  9.9  9.2     Chemistry:  Recent Labs      07/17/18   1120  07/18/18   0721   NA  145*  142   K  4.1  4.1   CL  104  106   CO2  29  27   GLUCOSE  85  84   BUN  24*  27*   CREATININE  0.50  0.57   ANIONGAP  12  9   LABGLOM  >60  >60   GFRAA  >60  >60   CALCIUM  9.1  8.2*     No results for input(s): PROT, LABALBU, LABA1C, J4WSCBP, B8EPION, FT4, TSH, AST, ALT, LDH, GGT, ALKPHOS, LABGGT, BILITOT, BILIDIR, AMMONIA, AMYLASE, LIPASE, LACTATE, CHOL, HDL, LDLCHOLESTEROL, CHOLHDLRATIO, TRIG, VLDL, VWK87KT, PHENYTOIN, PHENYF, URICACID, POCGLU in the last 72 hours. Lab Results   Component Value Date/Time    SPECIAL NOT REPORTED 09/02/2016 10:02 AM     Lab Results   Component Value Date/Time    CULTURE NO SIGNIFICANT GROWTH 09/02/2016 10:02 AM    CULTURE  09/02/2016 10:02 AM     Performed at [address] [place] [address], [address] [place] [address] [phone]       No results found for: POCPH, PHART, PH, POCPCO2, GIL3MFH, PCO2, POCPO2, PO2ART, PO2, POCHCO3, CFJ1FIP, HCO3, NBEA, PBEA, BEART, BE, THGBART, THB, MJC8UCS, JSLY0CDQ, I4AGJCWS, O2SAT, FIO2    Radiology:  1. Osteopenia   2. Nondisplaced fracture of the proximal fibula   3. Increased depression of the medial tibial plateau compared to previous   exams.  No definite fracture line is seen but an acute fracture cannot be   excluded given the change in appearance.  If indicated, a CT of the knee   would be helpful for further evaluation   4. Tricompartmental osteoarthritic change         Venous doppler of lower right extremity:  No evidence of superficial or deep venous thrombosis in the lower  extremity.   Multilevel edema noted      Physical Examination:        General appearance:  alert, cooperative and no distress  Mental Status:  oriented to person and place, normal affect  Lungs:

## 2018-07-18 NOTE — PROGRESS NOTES
Physical Therapy  DATE: 2018    NAME: Andrea Sánchez  MRN: [id number]   : 1945    Patient not seen this date for Physical Therapy due to:  [] Blood transfusion in progress  [] Cancel by RN  [] Hemodialysis  []  Refusal by Patient   [] Spine Precautions   [] Strict Bedrest  [x] Surgery  Today. [] Testing      [] Other        [] PT being discontinued at this time. Patient independent. No further needs. [] PT being discontinued at this time as the patient has been transferred to hospice care. No further needs.     Chacorta Cooper, PT

## 2018-07-18 NOTE — PROGRESS NOTES
sulfate, magnesium hydroxide, bisacodyl, morphine **OR** morphine, ondansetron **OR** ondansetron, oxyCODONE-acetaminophen **OR** oxyCODONE-acetaminophen    Data:     Past Medical History:   has a past medical history of Anxiety; Arthritis; Cataracts, bilateral; Fracture (healed) treatment follow-up; Mental retardation; Movement disorder; Primary osteoarthritis involving multiple joints; Rheumatoid arthritis ([facility] [address] and Seizures ([facility] [address] Social History:   reports that she has never smoked. She has never used smokeless tobacco. She reports that she does not drink alcohol or use drugs. Family History:   Family History   Problem Relation Age of Onset    Family history unknown: Yes       Vitals:  BP (!) 130/56   Pulse 83   Temp 98.1 °F (36.7 °C) (Oral)   Resp 20   Ht 5' (1.524 m)   Wt 147 lb (66.7 kg)   SpO2 100%   BMI 28.71 kg/m²   Temp (24hrs), Av.4 °F (36.9 °C), Min:97.7 °F (36.5 °C), Max:99 °F (37.2 °C)    No results for input(s): POCGLU in the last 72 hours. I/O (24Hr):     Intake/Output Summary (Last 24 hours) at 18 1036  Last data filed at 18 0916   Gross per 24 hour   Intake              360 ml   Output              450 ml   Net              -90 ml       Labs:    Hematology:  Recent Labs      18   1120  18   0721   WBC  4.5  3.5   RBC  3.62*  3.04*   HGB  11.9*  9.9*   HCT  35.5*  29.7*   MCV  98.1  97.9   MCH  32.9  32.6   MCHC  33.5  33.3   RDW  13.4  13.4   PLT  193  146   MPV  9.9  9.2     Chemistry:  Recent Labs      18   1120  18   0721   NA  145*  142   K  4.1  4.1   CL  104  106   CO2  29  27   GLUCOSE  85  84   BUN  24*  27*   CREATININE  0.50  0.57   ANIONGAP  12  9   LABGLOM  >60  >60   GFRAA  >60  >60   CALCIUM  9.1  8.2*     No results for input(s): PROT, LABALBU, LABA1C, N3NLNBR, O2IYIKK, FT4, TSH, AST, ALT, LDH, GGT, ALKPHOS, LABGGT, BILITOT, BILIDIR, AMMONIA, AMYLASE, LIPASE, LACTATE, CHOL, HDL, LDLCHOLESTEROL, CHOLHDLRATIO, TRIG, VLDL,

## 2018-07-19 ENCOUNTER — APPOINTMENT (OUTPATIENT)
Dept: GENERAL RADIOLOGY | Age: 73
DRG: 313 | End: 2018-07-19
Payer: MEDICAID

## 2018-07-19 PROCEDURE — G8987 SELF CARE CURRENT STATUS: HCPCS

## 2018-07-19 PROCEDURE — 2580000003 HC RX 258: Performed by: STUDENT IN AN ORGANIZED HEALTH CARE EDUCATION/TRAINING PROGRAM

## 2018-07-19 PROCEDURE — G8979 MOBILITY GOAL STATUS: HCPCS

## 2018-07-19 PROCEDURE — 6370000000 HC RX 637 (ALT 250 FOR IP): Performed by: NURSE PRACTITIONER

## 2018-07-19 PROCEDURE — 97530 THERAPEUTIC ACTIVITIES: CPT

## 2018-07-19 PROCEDURE — 71045 X-RAY EXAM CHEST 1 VIEW: CPT

## 2018-07-19 PROCEDURE — G8978 MOBILITY CURRENT STATUS: HCPCS

## 2018-07-19 PROCEDURE — 6370000000 HC RX 637 (ALT 250 FOR IP): Performed by: INTERNAL MEDICINE

## 2018-07-19 PROCEDURE — 97167 OT EVAL HIGH COMPLEX 60 MIN: CPT

## 2018-07-19 PROCEDURE — 1200000000 HC SEMI PRIVATE

## 2018-07-19 PROCEDURE — 97535 SELF CARE MNGMENT TRAINING: CPT

## 2018-07-19 PROCEDURE — 6360000002 HC RX W HCPCS: Performed by: INTERNAL MEDICINE

## 2018-07-19 PROCEDURE — 97162 PT EVAL MOD COMPLEX 30 MIN: CPT

## 2018-07-19 PROCEDURE — 2580000003 HC RX 258: Performed by: INTERNAL MEDICINE

## 2018-07-19 PROCEDURE — 99232 SBSQ HOSP IP/OBS MODERATE 35: CPT | Performed by: INTERNAL MEDICINE

## 2018-07-19 PROCEDURE — 6370000000 HC RX 637 (ALT 250 FOR IP): Performed by: STUDENT IN AN ORGANIZED HEALTH CARE EDUCATION/TRAINING PROGRAM

## 2018-07-19 PROCEDURE — G8988 SELF CARE GOAL STATUS: HCPCS

## 2018-07-19 RX ADMIN — CARBAMAZEPINE 800 MG: 100 TABLET, EXTENDED RELEASE ORAL at 20:56

## 2018-07-19 RX ADMIN — OXYCODONE HYDROCHLORIDE AND ACETAMINOPHEN 1 TABLET: 5; 325 TABLET ORAL at 05:20

## 2018-07-19 RX ADMIN — OXYCODONE HYDROCHLORIDE AND ACETAMINOPHEN 2 TABLET: 5; 325 TABLET ORAL at 20:57

## 2018-07-19 RX ADMIN — SODIUM CHLORIDE, SODIUM LACTATE, POTASSIUM CHLORIDE, CALCIUM CHLORIDE AND DEXTROSE MONOHYDRATE: 5; 600; 310; 30; 20 INJECTION, SOLUTION INTRAVENOUS at 09:35

## 2018-07-19 RX ADMIN — DIVALPROEX SODIUM 250 MG: 250 TABLET, EXTENDED RELEASE ORAL at 20:57

## 2018-07-19 RX ADMIN — DIVALPROEX SODIUM 250 MG: 250 TABLET, EXTENDED RELEASE ORAL at 09:33

## 2018-07-19 RX ADMIN — ENOXAPARIN SODIUM 40 MG: 40 INJECTION SUBCUTANEOUS at 09:33

## 2018-07-19 RX ADMIN — BUMETANIDE 1 MG: 1 TABLET ORAL at 09:34

## 2018-07-19 RX ADMIN — CARBAMAZEPINE 800 MG: 100 TABLET, EXTENDED RELEASE ORAL at 09:34

## 2018-07-19 RX ADMIN — DIVALPROEX SODIUM 1000 MG: 500 TABLET, DELAYED RELEASE ORAL at 20:57

## 2018-07-19 RX ADMIN — MORPHINE SULFATE 2 MG: 2 INJECTION, SOLUTION INTRAMUSCULAR; INTRAVENOUS at 11:45

## 2018-07-19 RX ADMIN — OXYCODONE HYDROCHLORIDE AND ACETAMINOPHEN 2 TABLET: 5; 325 TABLET ORAL at 09:40

## 2018-07-19 RX ADMIN — DIVALPROEX SODIUM 1000 MG: 500 TABLET, DELAYED RELEASE ORAL at 09:33

## 2018-07-19 RX ADMIN — Medication 10 ML: at 20:57

## 2018-07-19 ASSESSMENT — PAIN SCALES - GENERAL
PAINLEVEL_OUTOF10: 5
PAINLEVEL_OUTOF10: 4
PAINLEVEL_OUTOF10: 5

## 2018-07-19 ASSESSMENT — PAIN DESCRIPTION - ORIENTATION: ORIENTATION: RIGHT

## 2018-07-19 ASSESSMENT — PAIN DESCRIPTION - LOCATION: LOCATION: LEG

## 2018-07-19 ASSESSMENT — PAIN DESCRIPTION - FREQUENCY: FREQUENCY: INTERMITTENT

## 2018-07-19 ASSESSMENT — PAIN DESCRIPTION - PAIN TYPE: TYPE: SURGICAL PAIN

## 2018-07-19 NOTE — PROGRESS NOTES
Extremity Brace:  (posterior splint )  Position Activity Restriction  Other position/activity restrictions: MRDD, bed alarms,     Subjective   General  Chart Reviewed: Yes  Patient assessed for rehabilitation services?: Yes  Family / Caregiver Present: No  Pain Assessment  Patient Currently in Pain: Yes  Pain Assessment: Faces (pt with moans and verbalizations of pain with RLE movements and palpation; pt unable to rate pain due to cognitive deficits )  Pain Type: Surgical pain  Pain Location: Leg  Pain Orientation: Right  Pain Frequency: Intermittent (with movements and palpation )    Social/Functional History  Social/Functional History  Type of Home: Assisted living (group home )  Ambulation Assistance:  (RW)  Additional Comments: Pt is a poor historian and unable to provide info PTA. Per chart pt is DEP with all ADLS and has a caregiver named Laina Boston. No one present for eval.         Objective   Vision:  (unable to assess vision due to cognitive deficits and no glasses noted in room )  Hearing: Within functional limits    Orientation  Overall Orientation Status: Impaired  Orientation Level: Oriented to person;Disoriented to time;Disoriented to situation;Disoriented to place (oriented to name and birthdate )  Observation/Palpation  Posture: Poor (with use of RW with attempts at static standing; fair sitting balance EOB )  Observation: Pt with tangent speech and difficulties understanding at times. Max redirection needed at times.  Pt with alcazar and JOHAN IVs.    Edema: BLE's R>L; RLE ace wrapped and with posterior splint   Balance  Sitting Balance: Contact guard assistance (CG to SBA )  Standing Balance: Maximum assistance (x 2 )  Standing Balance  Time: sit tolerance 10 mins EOB; stand cecily < 15 seconds with max assist of 2 and with RW for static standing   Sit to stand: Maximum assistance (x2)  Stand to sit: Maximum assistance (x2)  Functional Mobility  Functional Mobility Comments: N/T and not appropriate and safe 60% but less than 80% impaired, limited or restricted                                            AM-PAC Score             Goals  Short term goals  Time Frame for Short term goals: By discharge, pt to demo   Short term goal 1: bed mob tasks with rail as needed and max assist of one with max verbal instruction/tactile assist.   Short term goal 2: simple groom tasks seated EOB with mod assist.    Short term goal 3: postural control seated EOB to SUP/fair plus and cecily > 20 mins with functional task completion. Short term goal 4: ADL transfers with mod assist of 2 with AD as needed and max verbal instruction/tactile assist.    Short term goal 5: Caregivers to demo good understanding and carrry over of OT education. Patient Goals   Patient goals : Pt unable to state due to cognitive deficits.         Therapy Time   Individual Concurrent Group Co-treatment   Time In 0826 (plus 10 min chart review )         Time Out 0856         Minutes Amad[address]

## 2018-07-19 NOTE — PROGRESS NOTES
Physical Therapy    Facility/Department: [facility]  Initial Assessment    NAME: Re Rush  : 1945  MRN: [id number]    Date of Service: 2018  Per ER note: Re Rush is a 68 y.o. female who presents to the emergency department For bruising to her right lower leg. Approximate 4 days ago she bumped it getting into a car. She's been walking on it but the bruising is been getting worse and she was not complaining of pain but she was brought in to be evaluated because of all the bruising. The patient has MR and is not able to provide a good history.    Discharge Recommendations:  Subacute/Skilled Nursing Facility   PT Equipment Recommendations  Equipment Needed: No    Patient Diagnosis(es): The encounter diagnosis was Closed fracture of right lower leg, initial encounter. has a past medical history of Anxiety; Arthritis; Cataracts, bilateral; Fracture (healed) treatment follow-up; Mental retardation; Movement disorder; Primary osteoarthritis involving multiple joints; Rheumatoid arthritis ([address] and Seizures ([facility] [address] has a past surgical history that includes Hip fracture surgery (Right, 14); Leg Surgery (Right, 2018); and pr treat tibial shaft fx, intramed implant (Right, 2018).     Restrictions  Restrictions/Precautions  Restrictions/Precautions: Weight Bearing, General Precautions, Fall Risk  Required Braces or Orthoses?: Yes  Lower Extremity Weight Bearing Restrictions  Right Lower Extremity Weight Bearing: Non Weight Bearing  Required Braces or Orthoses  Right Lower Extremity Brace:  (posterior splint )  Position Activity Restriction  Other position/activity restrictions: MRDD, bed alarms,   Vision/Hearing        Subjective  General  Chart Reviewed: Yes  Patient assessed for rehabilitation services?: Yes  Additional Pertinent Hx: Anxiety, OA, seizures, MRDD  Response To Previous Treatment: Not applicable  Family / Caregiver Present: No  Diagnosis: right tibial fracture with IM
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
## 2018-07-19 NOTE — PROGRESS NOTES
headache    Medications: Allergies:  No Known Allergies    Current Meds:   Scheduled Meds:    divalproex  250 mg Oral BID    bumetanide  1 mg Oral Daily    carBAMazepine  800 mg Oral BID    ketoconazole   Topical BID    divalproex  1,000 mg Oral 2 times per day    sodium chloride flush  10 mL Intravenous 2 times per day    enoxaparin  40 mg Subcutaneous Daily     Continuous Infusions:    dextrose 5% in lactated ringers 100 mL/hr at 18 0935     PRN Meds: sodium chloride flush, potassium chloride **OR** potassium chloride **OR** potassium chloride, magnesium sulfate, magnesium hydroxide, bisacodyl, morphine **OR** morphine, ondansetron **OR** ondansetron, oxyCODONE-acetaminophen **OR** oxyCODONE-acetaminophen    Data:     Past Medical History:   has a past medical history of Anxiety; Arthritis; Cataracts, bilateral; Fracture (healed) treatment follow-up; Mental retardation; Movement disorder; Primary osteoarthritis involving multiple joints; Rheumatoid arthritis ([facility] [address] and [facility]ures ([facility] Social History:   reports that she has never smoked. She has never used smokeless tobacco. She reports that she does not drink alcohol or use drugs. Family History:   Family History   Problem Relation Age of Onset    Family history unknown: Yes       Vitals:  BP (!) 121/53   Pulse 89   Temp 98.6 °F (37 °C) (Oral)   Resp 18   Ht 5' (1.524 m)   Wt 153 lb 11.2 oz (69.7 kg)   SpO2 99%   BMI 30.02 kg/m²   Temp (24hrs), Av.9 °F (36.6 °C), Min:97.2 °F (36.2 °C), Max:99 °F (37.2 °C)    No results for input(s): POCGLU in the last 72 hours. I/O (24Hr):     Intake/Output Summary (Last 24 hours) at 18 0941  Last data filed at 18 0527   Gross per 24 hour   Intake             4115 ml   Output             2075 ml   Net             2040 ml       Labs:    Hematology:  Recent Labs      18   1120  18   0721   WBC  4.5  3.5   RBC  3.62*  3.04*   HGB  11.9*  9.9*   HCT  35.5*  29.7*   MCV

## 2018-07-19 NOTE — PLAN OF CARE
Problem: Pain:  Goal: Patient's pain/discomfort is manageable  Patient's pain/discomfort is manageable   Outcome: Ongoing  Pain level assessment complete.    Patient educated on pain scale and control interventions  PRN pain medication given per patient request  Patient instructed to call out with new onset of pain or unrelieved pain

## 2018-07-19 NOTE — PLAN OF CARE
[facility]    Second Visit Note  For more detailed information please refer to the progress note of the day      7/19/2018    3:19 PM    Name:   Ricardo Almaraz  MRN:     [id number]     Teresalyside:      [de-identified]   Room:   2018/2018-02  IP Day:  2  Admit Date:  7/17/2018  9:59 AM    PCP:   Steve Hillman MD  Code Status:  Full Code        Pt vitals were reviewed   New labs were reviewed   Patient was seen    Updated plan :     1. Pain better controlled  2. PT/OT eval completed, snf recommended   3.   Discussion with patient and care giver about possible discharge tomorrow        YASHIRA Elizalde CNP  7/19/2018  3:19 PM

## 2018-07-20 VITALS
OXYGEN SATURATION: 96 % | HEIGHT: 60 IN | BODY MASS INDEX: 29.51 KG/M2 | TEMPERATURE: 98.3 F | SYSTOLIC BLOOD PRESSURE: 130 MMHG | RESPIRATION RATE: 16 BRPM | WEIGHT: 150.3 LBS | DIASTOLIC BLOOD PRESSURE: 52 MMHG | HEART RATE: 88 BPM

## 2018-07-20 PROCEDURE — 97530 THERAPEUTIC ACTIVITIES: CPT | Performed by: NURSE PRACTITIONER

## 2018-07-20 PROCEDURE — 99232 SBSQ HOSP IP/OBS MODERATE 35: CPT | Performed by: INTERNAL MEDICINE

## 2018-07-20 PROCEDURE — 6370000000 HC RX 637 (ALT 250 FOR IP): Performed by: INTERNAL MEDICINE

## 2018-07-20 PROCEDURE — 97110 THERAPEUTIC EXERCISES: CPT

## 2018-07-20 PROCEDURE — 6370000000 HC RX 637 (ALT 250 FOR IP): Performed by: NURSE PRACTITIONER

## 2018-07-20 PROCEDURE — 6360000002 HC RX W HCPCS: Performed by: INTERNAL MEDICINE

## 2018-07-20 PROCEDURE — 97535 SELF CARE MNGMENT TRAINING: CPT | Performed by: NURSE PRACTITIONER

## 2018-07-20 PROCEDURE — 6370000000 HC RX 637 (ALT 250 FOR IP): Performed by: STUDENT IN AN ORGANIZED HEALTH CARE EDUCATION/TRAINING PROGRAM

## 2018-07-20 PROCEDURE — 2580000003 HC RX 258: Performed by: INTERNAL MEDICINE

## 2018-07-20 PROCEDURE — 1200000000 HC SEMI PRIVATE

## 2018-07-20 RX ORDER — BISACODYL 10 MG
10 SUPPOSITORY, RECTAL RECTAL DAILY PRN
DISCHARGE
Start: 2018-07-20 | End: 2018-08-19

## 2018-07-20 RX ORDER — OXYCODONE HYDROCHLORIDE AND ACETAMINOPHEN 5; 325 MG/1; MG/1
1-2 TABLET ORAL EVERY 4 HOURS PRN
Qty: 15 TABLET | Refills: 0 | Status: SHIPPED | OUTPATIENT
Start: 2018-07-20 | End: 2018-07-27

## 2018-07-20 RX ORDER — DOCUSATE SODIUM 100 MG/1
100 CAPSULE, LIQUID FILLED ORAL 2 TIMES DAILY
Status: DISCONTINUED | OUTPATIENT
Start: 2018-07-20 | End: 2018-07-21 | Stop reason: HOSPADM

## 2018-07-20 RX ORDER — PSEUDOEPHEDRINE HCL 30 MG
100 TABLET ORAL 2 TIMES DAILY
DISCHARGE
Start: 2018-07-20 | End: 2020-01-29 | Stop reason: SDUPTHER

## 2018-07-20 RX ADMIN — DIVALPROEX SODIUM 250 MG: 250 TABLET, EXTENDED RELEASE ORAL at 20:52

## 2018-07-20 RX ADMIN — OXYCODONE HYDROCHLORIDE AND ACETAMINOPHEN 1 TABLET: 5; 325 TABLET ORAL at 18:59

## 2018-07-20 RX ADMIN — ENOXAPARIN SODIUM 40 MG: 40 INJECTION SUBCUTANEOUS at 09:31

## 2018-07-20 RX ADMIN — OXYCODONE HYDROCHLORIDE AND ACETAMINOPHEN 1 TABLET: 5; 325 TABLET ORAL at 09:30

## 2018-07-20 RX ADMIN — DOCUSATE SODIUM 100 MG: 100 CAPSULE, LIQUID FILLED ORAL at 20:52

## 2018-07-20 RX ADMIN — CARBAMAZEPINE 800 MG: 100 TABLET, EXTENDED RELEASE ORAL at 14:43

## 2018-07-20 RX ADMIN — BUMETANIDE 1 MG: 1 TABLET ORAL at 09:31

## 2018-07-20 RX ADMIN — KETOCONAZOLE: 20 CREAM TOPICAL at 09:34

## 2018-07-20 RX ADMIN — DIVALPROEX SODIUM 1000 MG: 500 TABLET, DELAYED RELEASE ORAL at 20:52

## 2018-07-20 RX ADMIN — DIVALPROEX SODIUM 1000 MG: 500 TABLET, DELAYED RELEASE ORAL at 14:44

## 2018-07-20 RX ADMIN — DOCUSATE SODIUM 100 MG: 100 CAPSULE, LIQUID FILLED ORAL at 09:31

## 2018-07-20 RX ADMIN — DIVALPROEX SODIUM 250 MG: 250 TABLET, EXTENDED RELEASE ORAL at 14:44

## 2018-07-20 RX ADMIN — Medication 10 ML: at 09:31

## 2018-07-20 RX ADMIN — CARBAMAZEPINE 800 MG: 100 TABLET, EXTENDED RELEASE ORAL at 20:53

## 2018-07-20 ASSESSMENT — PAIN DESCRIPTION - LOCATION
LOCATION: LEG

## 2018-07-20 ASSESSMENT — PAIN DESCRIPTION - ONSET
ONSET: ON-GOING
ONSET: ON-GOING

## 2018-07-20 ASSESSMENT — PAIN DESCRIPTION - FREQUENCY
FREQUENCY: INTERMITTENT
FREQUENCY: INTERMITTENT

## 2018-07-20 ASSESSMENT — PAIN DESCRIPTION - DESCRIPTORS
DESCRIPTORS: DISCOMFORT
DESCRIPTORS: DISCOMFORT

## 2018-07-20 ASSESSMENT — PAIN DESCRIPTION - PROGRESSION
CLINICAL_PROGRESSION: GRADUALLY IMPROVING
CLINICAL_PROGRESSION: NOT CHANGED
CLINICAL_PROGRESSION: NOT CHANGED

## 2018-07-20 ASSESSMENT — PAIN DESCRIPTION - PAIN TYPE
TYPE: SURGICAL PAIN

## 2018-07-20 ASSESSMENT — PAIN DESCRIPTION - ORIENTATION
ORIENTATION: RIGHT

## 2018-07-20 ASSESSMENT — PAIN SCALES - WONG BAKER
WONGBAKER_NUMERICALRESPONSE: 8;10
WONGBAKER_NUMERICALRESPONSE: 8;10

## 2018-07-20 NOTE — PROGRESS NOTES
Occupational Therapy  Facility/Department: [facility]  Daily Treatment Note  NAME: Kermit Stephens  : 1945  MRN: [id number]    Date of Service: 2018    Discharge Recommendations:  [address]       Patient Diagnosis(es): The encounter diagnosis was Closed fracture of right lower leg, initial encounter. has a past medical history of Anxiety; Arthritis; Cataracts, bilateral; Fracture (healed) treatment follow-up; Mental retardation; Movement disorder; Primary osteoarthritis involving multiple joints; Rheumatoid arthritis ([facility] [address] and Seizures ([facility] [address] has a past surgical history that includes Hip fracture surgery (Right, 14); Leg Surgery (Right, 2018); and pr treat tibial shaft fx, intramed implant (Right, 2018).     Restrictions  Restrictions/Precautions  Restrictions/Precautions: Weight Bearing, General Precautions, Fall Risk  Required Braces or Orthoses?: Yes  Lower Extremity Weight Bearing Restrictions  Right Lower Extremity Weight Bearing: Non Weight Bearing  Required Braces or Orthoses  Right Lower Extremity Brace:  (posterior splint )  Position Activity Restriction  Other position/activity restrictions: Bed/chair alarm, NWB RLE  Subjective   General  Chart Reviewed: Yes  Patient assessed for rehabilitation services?: Yes  Response to previous treatment: Patient with no complaints from previous session  Family / Caregiver Present: No  Pre Treatment Pain Screening  Intervention List: Patient able to continue with treatment;Nurse called to administer meds (VANDANA RN notified of request for pain medication, stated pt is not due for a while)  Pain Assessment  Patient Currently in Pain: Yes  Pain Assessment: Faces  Spencer-Baker Pain Rating: Hurts whole lot;Hurts worst  Pain Type: Surgical pain  Pain Location: Leg  Pain Orientation: Right  Vital Signs  Patient Currently in Pain: Yes  Oxygen Therapy  O2 Device: None (Room air)   Orientation  Orientation  Overall Orientation Status: Impaired  Orientation Level: Oriented to person;Disoriented to time;Disoriented to situation;Disoriented to place  Objective    ADL  Toileting: Dependent/Total (Using bed pan, dep with placement, hygiene, and changing bed pad and brief)   Grooming: Sat EOB to complete washing face with setup of washcloth, no UE support while completing grooming. Balance  Sitting Balance: Stand by assistance (Dynamic sitting EOB)  Standing Balance: Unable to assess(comment)  Bed mobility  Rolling to Left: Maximum assistance  Rolling to Right: Maximum assistance  Supine to Sit: Maximum assistance  Sit to Supine: Maximum assistance (x2)  Scooting: Maximal assistance  Comment: Max VC for  hand placement and tech with poor carryover  Transfers  Transfer Comments: Unable to assess due to pain  Attendance  Participation: Active participation                    Cognition  Overall Cognitive Status: Exceptions  Cognition Comment: Hx MRDD     Perception  Overall Perceptual Status: WFL        Additional Activities Comment  Additional Activities:      Upon writer exit, call light within reach, pt retired to bed. All lines intact and patient positioned comfortably. All patient needs addressed prior to ending therapy session. Chart reviewed prior to treatment and patient is agreeable for therapy. RN reports patient is medically stable for therapy treatment this date. Assessment   Performance deficits / Impairments: Decreased functional mobility ; Decreased ADL status; Decreased safe awareness;Decreased cognition;Decreased balance;Decreased endurance;Decreased strength;Decreased coordination  Assessment: SKilled OT indicated to maximize functional I and to reduce caregiver assist as needed.    Prognosis: Fair  Patient Education: OT POC, postural control, RLE NWB, proper bed mob tech, reality orientation, relaxation tech  REQUIRES OT FOLLOW UP: Yes  Activity Tolerance  Activity Tolerance: Patient limited by pain;Treatment limited secondary to decreased cognition  Safety Devices  Safety Devices in place: Yes  Type of devices: Call light within reach; Left in bed;Patient at risk for falls;Gait belt;Bed alarm in place;Nurse notified; All fall risk precautions in place          Plan   Plan  Times per week: 3-5x/week 1x/day as cecily   Current Treatment Recommendations: Strengthening, Balance Training, Endurance Training, Neuromuscular Re-education, Patient/Caregiver Education & Training, Self-Care / ADL, Equipment Evaluation, Education, & procurement, Cognitive Reorientation, Pain Management, Safety Education & Training, Positioning    AM-PAC Score        AM-PAC Inpatient Daily Activity Raw Score: 9  AM-PAC Inpatient ADL T-Scale Score : 25.33  ADL Inpatient CMS 0-100% Score: 79.59  ADL Inpatient CMS G-Code Modifier : CL    Goals  Short term goals  Time Frame for Short term goals: By discharge, pt to demo   Short term goal 1: bed mob tasks with rail as needed and max assist of one with max verbal instruction/tactile assist.   Short term goal 2: simple groom tasks seated EOB with mod assist.    Short term goal 3: postural control seated EOB to SUP/fair plus and cecily > 20 mins with functional task completion. Short term goal 4: ADL transfers with mod assist of 2 with AD as needed and max verbal instruction/tactile assist.    Short term goal 5: Caregivers to demo good understanding and carrry over of OT education. Patient Goals   Patient goals : Pt unable to state due to cognitive deficits.         Therapy Time   Individual Concurrent Group Co-treatment   Time In       0907   Time Out       0940   Minutes       [facility]

## 2018-07-20 NOTE — PLAN OF CARE
[facility]    Second Visit Note  For more detailed information please refer to the progress note of the day      7/20/2018    3:02 PM    Name:   Suzi Donovan  MRN:     [id number]     Kianide:      [de-identified]   Room:   2018/2018-02  IP Day:  3  Admit Date:  7/17/2018  9:59 AM    PCP:   Rosemarie Wood MD  Code Status:  Full Code        Pt vitals were reviewed   New labs were reviewed   Patient was seen    Updated plan :     1. Pain control with prn tolerable  2. F/u with ortho  3. Discharge today to [place] [address]   4.    Care giver at bedside and aware of plan      YASHIRA Peterson CNP  7/20/2018  3:02 PM

## 2018-07-20 NOTE — DISCHARGE SUMMARY
[facility]    Discharge Summary     Patient ID: Sanam Mendosa  :  1945   MRN: [id number]     ACCOUNT:  [de-identified]   Patient's PCP: Lawanda Heimlich, MD  Admit Date: 2018   Discharge Date: 2018     Length of Stay: 3  Code Status:  Full Code  Admitting Physician: Kaela Cobian,   Discharge Physician: Kaela Cobian DO     Active Discharge Diagnoses:     Hospital Problem Lists:  Principal Problem:    Closed right tibial fracture, closed, initial encounter  Active Problems:    Constipation    Seizure disorder    Mental retardation    Anxiety    Primary osteoarthritis involving multiple joints    Age-related osteoporosis with current pathological fracture with routine healing  Resolved Problems:    * No resolved hospital problems. *      Admission Condition:  poor     Discharged Condition: fair    Hospital Stay:     Hospital Course:  Sanam Mendosa is a 68 y.o. female who was admitted for the management of   Closed right tibial fracture, closed, initial encounter , presented to ER with Leg Pain (lower rt leg pain / bumped it 3 days ago)    The patient is a 68 y. o.   female who presents with Leg Pain (lower rt leg pain / bumped it 3 days ago)   and she is admitted to the hospital for the management of  Right leg pain.  Apparently she bumped it getting out of a vehicle last Tuesday, which is 6 days ago. Zachary Egyptian is the only injury the caregiver knows about.  The patient cannot really provide any medical history. Chante Chowdhury states that her usual Fabrice Cameron was in the shop being repaired and had a minivan that she was using with a step stool to get up into it and kicked against the car and injuring her right leg.  After the injury she continued to walk on it but had increasing pain and was brought to the hospital today and found to have a right tibia fibula fracture     18: Right IM nail of the Tibia      Significant therapeutic Osteopenia. Subtle irregularity of the fibular neck on one view only. If the patient has localized pain dedicated views of the knee are recommended for follow-up. Xr Ankle Right (min 3 Views)    Result Date: 7/17/2018  EXAMINATION: 3 XRAY VIEWS OF THE RIGHT ANKLE; 2 XRAY VIEWS OF THE RIGHT TIBIA AND FIBULA 7/17/2018 10:26 am COMPARISON: None. HISTORY: ORDERING SYSTEM PROVIDED HISTORY: Pain TECHNOLOGIST PROVIDED HISTORY: Reason for exam:->Pain Acuity: Acute Type of Exam: Initial FINDINGS: The bones are osteopenic. Small cluster of soft tissue calcifications in the medial calf could be vascular. Lateral views of the ankle are in oblique orientation. Comminuted distal tibial and fibular shaft fractures with mild apex lateral angulation. Approximately 1/2 shaft width lateral displacement of the distal tibial fragment. Mild degenerative changes of the ankle and knee. Soft tissue swelling about the ankle. Partially visualized deformities of the 4th and probably 2nd metatarsals compatible with old healed fractures. On the lateral view there is subtle irregularity of the fibular neck probably due to overlap. Apparent slight depression of lateral tibial plateau could be projectional.  Recommend dedicated views of the knee for follow-up if the patient has localized symptoms. Comminuted fractures of the distal tibia and fibula. Osteopenia. Subtle irregularity of the fibular neck on one view only. If the patient has localized pain dedicated views of the knee are recommended for follow-up. Xr Chest Portable    Result Date: 7/19/2018  EXAMINATION: SINGLE XRAY VIEW OF THE CHEST 7/19/2018 9:22 am COMPARISON: 07/18/2018 HISTORY: ORDERING SYSTEM PROVIDED HISTORY: Post-pneumothorax TECHNOLOGIST PROVIDED HISTORY: Reason for exam:->Post-pneumothorax Ordering Physician Provided Reason for Exam: Pneumothorax Acuity: Acute Type of Exam: Subsequent/Follow-up FINDINGS: The mediastinal and cardiac contours are stable. There is no focal consolidation. There is no pleural effusion or pneumothorax identified. No pneumothorax identified. Xr Chest Portable    Result Date: 7/18/2018  EXAMINATION: SINGLE XRAY VIEW OF THE CHEST 7/18/2018 2:24 pm COMPARISON: Chest July 18, 2018. HISTORY: ORDERING SYSTEM PROVIDED HISTORY: post op TECHNOLOGIST PROVIDED HISTORY: Reason for exam:->post op Ordering Physician Provided Reason for Exam: Post op chest xray, attention to left side. Acuity: Acute Type of Exam: Subsequent/Follow-up FINDINGS: Interval extubation. There has been interval improved aeration of the left lung since the prior study. Cardiomegaly is still unchanged. No pneumothorax, large pleural effusion or free air. Interval improvement aeration of the left lung status post extubation. Xr Chest Portable    Result Date: 7/17/2018  EXAMINATION: SINGLE XRAY VIEW OF THE CHEST 7/17/2018 11:19 am COMPARISON: November 19, 2014. HISTORY: ORDERING SYSTEM PROVIDED HISTORY: Pre-op TECHNOLOGIST PROVIDED HISTORY: Reason for exam:->Pre-op Acuity: Acute Type of Exam: Unknown FINDINGS: Cardiac and mediastinal contours are enlarged but unchanged. No focal consolidation, pneumothorax, or significant pleural effusion. Mild degenerative changes of the bilateral shoulders. Degenerative changes of the lower thoracic and upper lumbar spine. Mildly enlarged cardiomediastinal silhouette. No radiographic evidence of acute cardiopulmonary process. Xr Chest 1 Vw    Result Date: 7/18/2018  EXAMINATION: SINGLE XRAY VIEW OF THE CHEST 7/18/2018 12:48 pm COMPARISON: 07/17/2018 HISTORY: ORDERING SYSTEM PROVIDED HISTORY: intra op film, vital alarms TECHNOLOGIST PROVIDED HISTORY: Reason for exam:->intra op film, vital alarms Ordering Physician Provided Reason for Exam: intra op film, vital alarms going off Acuity: Acute Type of Exam: Initial FINDINGS: Endotracheal tube in place terminating in the right mainstem bronchus.   The right lung is Impression:  The common femoral, femoral, popliteal, tibials, and saphenous veins are  compressible with normal doppler responses. Thigh, calf and ankle edema noted. Velocities are measured in cm/s ; Diameters are measured in cm Right Lower Extremities DVT Study Measurements Right 2D Measurements +------------------------------------+----------+---------------+----------+ ! Location                            ! Visualized! Compressibility! Thrombosis! +------------------------------------+----------+---------------+----------+ ! Common Femoral                      !Yes       ! Yes            ! None      ! +------------------------------------+----------+---------------+----------+ ! Prox Femoral                        !Yes       ! Yes            ! None      ! +------------------------------------+----------+---------------+----------+ ! Mid Femoral                         !Yes       ! Yes            ! None      ! +------------------------------------+----------+---------------+----------+ ! Dist Femoral                        !Yes       ! Yes            ! None      ! +------------------------------------+----------+---------------+----------+ ! Deep Femoral                        !Yes       ! Yes            ! None      ! +------------------------------------+----------+---------------+----------+ ! Popliteal                           !Yes       ! Yes            ! None      ! +------------------------------------+----------+---------------+----------+ ! Sapheno Femoral Junction            ! Yes       ! Yes            ! None      ! +------------------------------------+----------+---------------+----------+ ! PTV                                 ! Yes       ! Yes            ! None      ! +------------------------------------+----------+---------------+----------+ ! Peroneal                            !Yes       ! Yes            ! None      ! +------------------------------------+----------+---------------+----------+ ! Gastroc !Yes       !Yes            ! None      ! +------------------------------------+----------+---------------+----------+ ! GSV Thigh                           ! Yes       ! Yes            ! None      ! +------------------------------------+----------+---------------+----------+ ! GSV Knee                            ! Yes       ! Yes            ! None      ! +------------------------------------+----------+---------------+----------+ ! GSV Ankle                           ! Yes       ! Yes            ! None      ! +------------------------------------+----------+---------------+----------+ ! SSV                                 ! Yes       ! Yes            ! None      ! +------------------------------------+----------+---------------+----------+ Right Doppler Measurements +---------------------------+------+------+--------------------------------+ ! Location                   ! Signal!Reflux! Reflux (msec)                   ! +---------------------------+------+------+--------------------------------+ ! Common Femoral             !Phasic!      !                                ! +---------------------------+------+------+--------------------------------+ ! Prox Femoral               !Phasic!      !                                ! +---------------------------+------+------+--------------------------------+ ! Popliteal                  !Phasic!      !                                ! +---------------------------+------+------+--------------------------------+        Consultations:    Consults:     Final Specialist Recommendations/Findings:   IP CONSULT TO ORTHOPEDIC SURGERY  IP CONSULT TO HOSPITALIST      The patient was seen and examined on day of discharge and this discharge summary is in conjunction with any daily progress note from day of discharge.     Discharge plan:     Disposition: Skilled Facility    Physician Follow Up:     Daylin Blair MD  [address] [place] [address], [address]  [address] [address]  Tristin Tom DO  [facility]  [phone]             Requiring Further Evaluation/Follow Up POST HOSPITALIZATION/Incidental Findings: healing of fracture    Diet: regular diet    Activity: As tolerated    Instructions to Patient:     Discharge Medications:      Medication List      START taking these medications    bisacodyl 10 MG suppository  Commonly known as:  DULCOLAX  Place 1 suppository rectally daily as needed for Constipation     docusate 100 MG Caps  Commonly known as:  COLACE, DULCOLAX  Take 100 mg by mouth 2 times daily     enoxaparin 40 MG/0.4ML injection  Commonly known as:  LOVENOX  Inject 0.4 mLs into the skin daily     oxyCODONE-acetaminophen 5-325 MG per tablet  Commonly known as:  PERCOCET  Take 1-2 tablets by mouth every 4 hours as needed for Pain for up to 7 days. .        CONTINUE taking these medications    alendronate 70 MG tablet  Commonly known as:  FOSAMAX  TAKE 1 TABLET EVERY WEEK     bumetanide 1 MG tablet  Commonly known as:  BUMEX  TAKE 1 TABLET BY MOUTH DAILY     CARBATROL 200 MG extended release capsule  Generic drug:  carBAMazepine  TAKE 4 CAPSULES TWICE A DAY     * divalproex 500 MG DR tablet  Commonly known as:  DEPAKOTE  Take 2 po bid. Total 1250 mg po bid     * divalproex 250 MG extended release tablet  Commonly known as:  DEPAKOTE ER     ketoconazole 2 % cream  Commonly known as:  NIZORAL  Apply topically daily. * This list has 2 medication(s) that are the same as other medications prescribed for you. Read the directions carefully, and ask your doctor or other care provider to review them with you.             STOP taking these medications    naproxen 500 MG tablet  Commonly known as:  NAPROSYN           Where to Get Your Medications      You can get these medications from any pharmacy    Bring a paper prescription for each of these medications  · oxyCODONE-acetaminophen 5-325 MG per tablet     Information about where to get these medications

## 2018-07-20 NOTE — CARE COORDINATION
Social work: spoke to group home caregiver and advised [facility] will admit tonight. Room 502. David archuleta will submit for level of care also.  Lana العلي

## 2018-07-21 NOTE — PROGRESS NOTES
Yuridia Swan arrived to  pt via stretcher. All of pt belongings collected with caregiver at bedside. Called [facility] of [facility] and gave report to SEFERINO Barajas.

## 2018-07-23 ENCOUNTER — CARE COORDINATION (OUTPATIENT)
Dept: CASE MANAGEMENT | Age: 73
End: 2018-07-23

## 2018-08-01 ENCOUNTER — CARE COORDINATION (OUTPATIENT)
Dept: CASE MANAGEMENT | Age: 73
End: 2018-08-01

## 2018-08-02 ENCOUNTER — CARE COORDINATION (OUTPATIENT)
Dept: CASE MANAGEMENT | Age: 73
End: 2018-08-02

## 2018-08-02 NOTE — CARE COORDINATION
[facility] Update Call    2018    Patient: Dick Poag Patient : 1945   MRN: [id number]  Reason for Admission: There are no discharge diagnoses documented for the most recent discharge. Discharge Date: 18 RARS: Readmission Risk Score: 16       Care Transitions Post Acute Facility Update    Care Transitions Interventions  Post Acute Facility:  [facility] Update     Secure e-mail sent to staff @ SNF. Request verification of Skilled Services, number of skilled days used, discharge planning, clinical and functional progress reports.

## 2018-08-15 ENCOUNTER — CARE COORDINATION (OUTPATIENT)
Dept: CASE MANAGEMENT | Age: 73
End: 2018-08-15

## 2018-08-20 ENCOUNTER — CARE COORDINATION (OUTPATIENT)
Dept: CASE MANAGEMENT | Age: 73
End: 2018-08-20

## 2018-08-20 NOTE — CARE COORDINATION
[facility] Update Call    2018    Patient: Ross Reddy Patient : 1945   MRN: [id number]  Reason for Admission: There are no discharge diagnoses documented for the most recent discharge. Discharge Date: 18 RARS: Readmission Risk Score: 16       Care Transitions Episode Closed.   CMRS:  1  RARS:  13.75

## 2018-09-11 ENCOUNTER — OFFICE VISIT (OUTPATIENT)
Dept: PODIATRY | Age: 73
End: 2018-09-11
Payer: MEDICAID

## 2018-09-11 VITALS — WEIGHT: 149 LBS | RESPIRATION RATE: 16 BRPM | HEIGHT: 62 IN | BODY MASS INDEX: 27.42 KG/M2

## 2018-09-11 DIAGNOSIS — G40.909 SEIZURE DISORDER (HCC): Chronic | ICD-10-CM

## 2018-09-11 DIAGNOSIS — I73.9 PVD (PERIPHERAL VASCULAR DISEASE) (HCC): ICD-10-CM

## 2018-09-11 DIAGNOSIS — M79.671 PAIN IN BOTH FEET: ICD-10-CM

## 2018-09-11 DIAGNOSIS — F79 MENTAL RETARDATION: Chronic | ICD-10-CM

## 2018-09-11 DIAGNOSIS — B35.1 DERMATOPHYTOSIS OF NAIL: Primary | ICD-10-CM

## 2018-09-11 DIAGNOSIS — M79.672 PAIN IN BOTH FEET: ICD-10-CM

## 2018-09-11 PROCEDURE — 11721 DEBRIDE NAIL 6 OR MORE: CPT | Performed by: PODIATRIST

## 2018-09-11 PROCEDURE — 99213 OFFICE O/P EST LOW 20 MIN: CPT | Performed by: PODIATRIST

## 2018-09-14 NOTE — PROGRESS NOTES
facility-administered medications on file prior to visit. Review of Systems. Review of Systems - History obtained from chart review and the patient  General ROS: negative for - chills, fatigue, fever, night sweats or weight gain  Constitutional: Negative for chills, diaphoresis, fatigue, fever and unexpected weight change. Musculoskeletal: Positive for arthralgias, gait problem and joint swelling. Neurological ROS: negative for - behavioral changes, confusion, headaches or seizures. Negative for weakness and numbness. Dermatological ROS: negative for - mole changes, rash  Cardiovascular: Negative for leg swelling. Gastrointestinal: Negative for constipation, diarrhea, nausea and vomiting. Objective:  General: AAO x 3 in NAD. Derm  Toenail Description  Sites of Onychomycosis Involvement (Check affected area)  [x] [x] [x] [x] [x] [x] [x] [x] [x] [x]  5 4 3 2 1 1 2 3 4 5                          Right                                        Left    Thickness  [x] [x] [x] [x] [x] [x] [x] [x] [x] [x]  5 4 3 2 1 1 2 3 4 5                         Right                                        Left    Dystrophic Changes   [x] [x] [x] [x] [x] [x] [x] [x] [x] [x]  5 4 3 2 1 1 2 3 4 5                         Right                                        Left    Color  [x] [x] [x] [x] [x] [x] [x] [x] [x] [x]  5 4 3 2 1 1 2 3 4 5                          Right                                        Left    Incurvation/Ingrowin   [] [] [] [] [] [] [] [] [] []  5 4 3 2 1 1 2 3 4 5                         Right                                        Left    Inflammation/Pain   [x] [x] [x] [x] [x] [x] [x] [x] [x] [x]  5 4 3  2 1 1 2 3 4 5                         Right                                        Left       Nails are painful 1-10 with direct palpation. Dermatologic Exam:  Skin lesion/ulceration Absent . Skin No rashes or nodules noted. .       Musculoskeletal:     1st MPJ ROM decreased,

## 2018-10-23 ENCOUNTER — TELEPHONE (OUTPATIENT)
Dept: FAMILY MEDICINE CLINIC | Age: 73
End: 2018-10-23

## 2018-10-29 ENCOUNTER — OFFICE VISIT (OUTPATIENT)
Dept: FAMILY MEDICINE CLINIC | Age: 73
End: 2018-10-29
Payer: MEDICAID

## 2018-10-29 VITALS
DIASTOLIC BLOOD PRESSURE: 84 MMHG | BODY MASS INDEX: 27.25 KG/M2 | WEIGHT: 149 LBS | HEART RATE: 72 BPM | SYSTOLIC BLOOD PRESSURE: 150 MMHG

## 2018-10-29 DIAGNOSIS — S82.124D CLOSED NONDISPLACED FRACTURE OF LATERAL CONDYLE OF RIGHT TIBIA WITH ROUTINE HEALING, SUBSEQUENT ENCOUNTER: ICD-10-CM

## 2018-10-29 DIAGNOSIS — M15.9 PRIMARY OSTEOARTHRITIS INVOLVING MULTIPLE JOINTS: Primary | ICD-10-CM

## 2018-10-29 DIAGNOSIS — Z23 NEED FOR IMMUNIZATION AGAINST INFLUENZA: ICD-10-CM

## 2018-10-29 DIAGNOSIS — G40.909 SEIZURE DISORDER (HCC): Chronic | ICD-10-CM

## 2018-10-29 DIAGNOSIS — F79 MENTAL RETARDATION: Chronic | ICD-10-CM

## 2018-10-29 PROCEDURE — 1111F DSCHRG MED/CURRENT MED MERGE: CPT | Performed by: FAMILY MEDICINE

## 2018-10-29 PROCEDURE — 99213 OFFICE O/P EST LOW 20 MIN: CPT | Performed by: FAMILY MEDICINE

## 2018-10-29 PROCEDURE — 90471 IMMUNIZATION ADMIN: CPT | Performed by: FAMILY MEDICINE

## 2018-10-29 PROCEDURE — 90688 IIV4 VACCINE SPLT 0.5 ML IM: CPT | Performed by: FAMILY MEDICINE

## 2018-10-29 ASSESSMENT — PATIENT HEALTH QUESTIONNAIRE - PHQ9
1. LITTLE INTEREST OR PLEASURE IN DOING THINGS: 0
SUM OF ALL RESPONSES TO PHQ9 QUESTIONS 1 & 2: 0
2. FEELING DOWN, DEPRESSED OR HOPELESS: 0
SUM OF ALL RESPONSES TO PHQ QUESTIONS 1-9: 0
SUM OF ALL RESPONSES TO PHQ QUESTIONS 1-9: 0

## 2018-10-29 ASSESSMENT — ENCOUNTER SYMPTOMS
SHORTNESS OF BREATH: 0
COUGH: 0
BACK PAIN: 0
VOMITING: 0
NAUSEA: 0
SORE THROAT: 0
DIARRHEA: 0
SINUS PRESSURE: 0

## 2018-10-29 NOTE — PROGRESS NOTES
for cough and shortness of breath. Cardiovascular: Negative for chest pain and leg swelling. Gastrointestinal: Negative for diarrhea, nausea and vomiting. Genitourinary: Negative for dysuria, menstrual problem, urgency and vaginal discharge. Musculoskeletal: Positive for arthralgias and gait problem. Negative for back pain and myalgias. Skin: Negative for rash. Neurological: Negative for weakness, numbness and headaches. Psychiatric/Behavioral: Positive for confusion and dysphoric mood. Negative for decreased concentration and sleep disturbance. The patient is nervous/anxious. Objective:   BP (!) 150/84   Pulse 72   Wt 149 lb (67.6 kg)   BMI 27.25 kg/m²     Physical Exam   Constitutional: She is oriented to person, place, and time. She appears well-developed and well-nourished. No distress. HENT:   Head: Normocephalic and atraumatic. Mouth/Throat: No oropharyngeal exudate. Eyes: No scleral icterus. Neck: Neck supple. Carotid bruit is not present. Cardiovascular: Exam reveals no gallop and no friction rub. No murmur heard. Pulmonary/Chest: No respiratory distress. She has no wheezes. She has no rales. She exhibits no tenderness. Musculoskeletal: She exhibits no edema or tenderness. Lymphadenopathy:     She has no cervical adenopathy. Neurological: She is alert and oriented to person, place, and time. No cranial nerve deficit. Skin: No rash noted. She is not diaphoretic. Psychiatric: She has a normal mood and affect. Her behavior is normal. Judgment and thought content normal.       Assessment:       Diagnosis Orders   1. Primary osteoarthritis involving multiple joints     2. Need for immunization against influenza  INFLUENZA, QUADV, 3 YRS AND OLDER, IM, MDV, 0.5ML (FLUZONE QUADV)   3. Seizure disorder     4. Mental retardation     5.  Closed nondisplaced fracture of lateral condyle of right tibia with routine healing, subsequent encounter  PA DISCHARGE MEDS RECONCILED

## 2018-11-06 DIAGNOSIS — Z12.11 COLON CANCER SCREENING: Primary | ICD-10-CM

## 2018-11-29 ENCOUNTER — TELEPHONE (OUTPATIENT)
Dept: FAMILY MEDICINE CLINIC | Age: 73
End: 2018-11-29

## 2018-11-29 RX ORDER — MEDICAL SUPPLY, MISCELLANEOUS
EACH MISCELLANEOUS
Qty: 1 EACH | Refills: 0 | Status: SHIPPED | OUTPATIENT
Start: 2018-11-29 | End: 2019-01-25 | Stop reason: SDUPTHER

## 2018-12-20 ENCOUNTER — OFFICE VISIT (OUTPATIENT)
Dept: PODIATRY | Age: 73
End: 2018-12-20
Payer: MEDICAID

## 2018-12-20 VITALS — HEIGHT: 62 IN | BODY MASS INDEX: 27.42 KG/M2 | WEIGHT: 149 LBS

## 2018-12-20 DIAGNOSIS — M79.671 BILATERAL FOOT PAIN: Primary | ICD-10-CM

## 2018-12-20 DIAGNOSIS — M79.672 BILATERAL FOOT PAIN: Primary | ICD-10-CM

## 2018-12-20 DIAGNOSIS — B35.1 DERMATOPHYTOSIS OF NAIL: ICD-10-CM

## 2018-12-20 DIAGNOSIS — I73.9 PERIPHERAL VASCULAR DISORDER (HCC): ICD-10-CM

## 2018-12-20 PROCEDURE — 11721 DEBRIDE NAIL 6 OR MORE: CPT | Performed by: PODIATRIST

## 2018-12-20 PROCEDURE — 99213 OFFICE O/P EST LOW 20 MIN: CPT | Performed by: PODIATRIST

## 2018-12-20 NOTE — PROGRESS NOTES
(BUMEX) 1 MG tablet TAKE 1 TABLET BY MOUTH DAILY 30 tablet 11     No current facility-administered medications on file prior to visit. Review of Systems. Review of Systems:   History obtained from chart review and the patient  General ROS: negative for - chills, fatigue, fever, night sweats or weight gain  Constitutional: Negative for chills, diaphoresis, fatigue, fever and unexpected weight change. Musculoskeletal: Positive for arthralgias, gait problem and joint swelling. Neurological ROS: negative for - behavioral changes, confusion, headaches or seizures. Negative for weakness and numbness. Dermatological ROS: negative for - mole changes, rash  Cardiovascular: Negative for leg swelling. Gastrointestinal: Negative for constipation, diarrhea, nausea and vomiting. Objective:  General: AAO x 3 in NAD. Derm  Toenail Description  Sites of Onychomycosis Involvement (Check affected area)  [x] [x] [x] [x] [x] [x] [x] [x] [x] [x]  5 4 3 2 1 1 2 3 4 5                          Right                                        Left    Thickness  [x] [x] [x] [x] [x] [x] [x] [x] [x] [x]  5 4 3 2 1 1 2 3 4 5                         Right                                        Left    Dystrophic Changes   [x] [x] [x] [x] [x] [x] [x] [x] [x] [x]  5 4 3 2 1 1 2 3 4 5                         Right                                        Left    Color  [x] [x] [x] [x] [x] [x] [x] [x] [x] [x]  5 4 3 2 1 1 2 3 4 5                          Right                                        Left    Incurvation/Ingrowin   [] [] [] [] [] [] [] [] [] []  5 4 3 2 1 1 2 3 4 5                         Right                                        Left    Inflammation/Pain   [x] [x] [x] [x] [x] [x] [x] [x] [x] [x]  5 4 3  2 1 1 2 3 4 5                         Right                                        Left       Nails are painful 1-10 with direct palpation. Dermatologic:  No skin lesions present.    Dry scaly skin noted to the plantar surface of the right and left foot. Musculoskeletal:     1st MPJ ROM decreased, Bilateral.  Muscle strength 5/5, Bilateral.  Pain present upon palpation of toenails 1-5, Bilateral. decreased medial longitudinal arch, Bilateral.  Ankle ROM decreased,Bilateral.    Dorsally contracted digits present digits 2, Bilateral.     Vascular: DP and PT pulses palpable 1/4, Bilateral.  CFT <5 seconds, Bilateral.  Hair growth absent to the level of the digits, Bilateral.  Edema present, Bilateral.  Varicosities absent, Bilateral. Erythema absent, Bilateral    Integument: Warm, dry, supple, Bilateral.  Open lesion absent, Bilateral.  Interdigital maceration absent to web spaces 4, Bilateral.  Nails 1-5 left and 1-5 right thickened > 3.0 mm, dystrophic and crumbly, discolored with subungual debris. Fissures absent, Bilateral.   Neurological:  Sensation present to light touch to level of digits, Bilateral.      Assessment:  68 y.o. female with:    Diagnosis Orders   1. Bilateral foot pain  47036 - PA DEBRIDEMENT OF NAILS, 6 OR MORE   2. Dermatophytosis of nail  63661 - PA DEBRIDEMENT OF NAILS, 6 OR MORE   3. Peripheral vascular disorder ([facility] [address]  95000 - PA DEBRIDEMENT OF NAILS, 6 OR MORE       Plan:   Pt was evaluated and examined. Patient was given personalized discharge instructions. Nails 1-10 were debrided in length and thickness sharply with a nail nipper and  without incident. Pt will follow up in 9 weeks or sooner if any problems arise. Diagnosis was discussed with the pt and all of their questions were answered in detail. Proper foot hygiene and care was discussed with the pt. Patient to check feet daily and contact the office with any questions/problems/concerns. Other comorbidity noted and will be managed by PCP. Pain waiver discussed with patient and confirmed.    12/20/2018      Electronically signed by Virginia Kinsey DPM on 12/20/2018 at 2:54 PM  12/20/2018

## 2018-12-27 RX ORDER — NAPROXEN 500 MG/1
TABLET ORAL
Qty: 60 TABLET | Refills: 2 | Status: SHIPPED | OUTPATIENT
Start: 2018-12-27 | End: 2019-03-21 | Stop reason: SDUPTHER

## 2019-01-24 RX ORDER — BUMETANIDE 1 MG/1
TABLET ORAL
Qty: 30 TABLET | Refills: 11 | Status: SHIPPED | OUTPATIENT
Start: 2019-01-24 | End: 2020-01-02

## 2019-01-25 ENCOUNTER — TELEPHONE (OUTPATIENT)
Dept: FAMILY MEDICINE CLINIC | Age: 74
End: 2019-01-25

## 2019-01-25 RX ORDER — MEDICAL SUPPLY, MISCELLANEOUS
EACH MISCELLANEOUS
Qty: 1 EACH | Refills: 0 | Status: SHIPPED | OUTPATIENT
Start: 2019-01-25 | End: 2019-11-07

## 2019-02-07 ENCOUNTER — TELEPHONE (OUTPATIENT)
Dept: FAMILY MEDICINE CLINIC | Age: 74
End: 2019-02-07

## 2019-03-13 ENCOUNTER — OFFICE VISIT (OUTPATIENT)
Dept: FAMILY MEDICINE CLINIC | Age: 74
End: 2019-03-13
Payer: MEDICAID

## 2019-03-13 VITALS
OXYGEN SATURATION: 95 % | DIASTOLIC BLOOD PRESSURE: 80 MMHG | BODY MASS INDEX: 25.62 KG/M2 | WEIGHT: 140.1 LBS | SYSTOLIC BLOOD PRESSURE: 140 MMHG | HEART RATE: 81 BPM

## 2019-03-13 DIAGNOSIS — G40.909 SEIZURE DISORDER (HCC): ICD-10-CM

## 2019-03-13 DIAGNOSIS — R07.9 CHEST PAIN, UNSPECIFIED TYPE: Primary | ICD-10-CM

## 2019-03-13 PROCEDURE — 99213 OFFICE O/P EST LOW 20 MIN: CPT | Performed by: FAMILY MEDICINE

## 2019-03-13 PROCEDURE — 1111F DSCHRG MED/CURRENT MED MERGE: CPT | Performed by: FAMILY MEDICINE

## 2019-03-13 ASSESSMENT — ENCOUNTER SYMPTOMS
VOMITING: 0
NAUSEA: 0
DIARRHEA: 0
COUGH: 0
SORE THROAT: 0
SHORTNESS OF BREATH: 0
SINUS PRESSURE: 0
BACK PAIN: 0

## 2019-03-13 ASSESSMENT — PATIENT HEALTH QUESTIONNAIRE - PHQ9
2. FEELING DOWN, DEPRESSED OR HOPELESS: 0
SUM OF ALL RESPONSES TO PHQ9 QUESTIONS 1 & 2: 0
SUM OF ALL RESPONSES TO PHQ QUESTIONS 1-9: 0
1. LITTLE INTEREST OR PLEASURE IN DOING THINGS: 0
SUM OF ALL RESPONSES TO PHQ QUESTIONS 1-9: 0

## 2019-03-21 RX ORDER — NAPROXEN 500 MG/1
TABLET ORAL
Qty: 90 TABLET | Refills: 3 | Status: SHIPPED | OUTPATIENT
Start: 2019-03-21 | End: 2019-09-10 | Stop reason: SDUPTHER

## 2019-05-02 DIAGNOSIS — R29.898 WEAKNESS OF BOTH LOWER EXTREMITIES: ICD-10-CM

## 2019-05-07 ENCOUNTER — OFFICE VISIT (OUTPATIENT)
Dept: NEUROLOGY | Age: 74
End: 2019-05-07
Payer: MEDICAID

## 2019-05-07 VITALS
HEIGHT: 60 IN | DIASTOLIC BLOOD PRESSURE: 74 MMHG | WEIGHT: 140 LBS | HEART RATE: 85 BPM | SYSTOLIC BLOOD PRESSURE: 148 MMHG | BODY MASS INDEX: 27.48 KG/M2

## 2019-05-07 DIAGNOSIS — G40.909 SEIZURE DISORDER (HCC): Primary | ICD-10-CM

## 2019-05-07 DIAGNOSIS — F79 MENTAL RETARDATION: ICD-10-CM

## 2019-05-07 PROCEDURE — 99214 OFFICE O/P EST MOD 30 MIN: CPT | Performed by: PSYCHIATRY & NEUROLOGY

## 2019-05-07 RX ORDER — DIVALPROEX SODIUM 500 MG/1
TABLET, DELAYED RELEASE ORAL
Qty: 120 TABLET | Refills: 11 | Status: SHIPPED | OUTPATIENT
Start: 2019-05-07 | End: 2020-04-17

## 2019-05-07 RX ORDER — DIVALPROEX SODIUM 250 MG/1
TABLET, DELAYED RELEASE ORAL
Qty: 60 TABLET | Refills: 11 | Status: SHIPPED | OUTPATIENT
Start: 2019-05-07 | End: 2019-11-07 | Stop reason: SDUPTHER

## 2019-05-07 RX ORDER — CARBAMAZEPINE 200 MG/1
CAPSULE, EXTENDED RELEASE ORAL
Qty: 240 CAPSULE | Refills: 11 | Status: SHIPPED | OUTPATIENT
Start: 2019-05-07 | End: 2020-04-17

## 2019-05-07 ASSESSMENT — ENCOUNTER SYMPTOMS
EYES NEGATIVE: 1
ALLERGIC/IMMUNOLOGIC NEGATIVE: 1
GASTROINTESTINAL NEGATIVE: 1
RESPIRATORY NEGATIVE: 1
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
## 2019-05-07 NOTE — LETTER
 MD TREAT TIBIAL SHAFT FX, INTRAMED IMPLANT Right 7/18/2018    RIGHT TIBIA IM MARÍA -KATE KNEE TRIANGLES, INTRAMEDULLARY REAMERS, SYNTHES TIBIAL NAIL performed by Mikael Fleischer, DO at [place] History   Family history unknown: Yes       Social History     Socioeconomic History    Marital status: Single     Spouse name: None    Number of children: None    Years of education: None    Highest education level: None   Occupational History    None   Social Needs    Financial resource strain: None    Food insecurity:     Worry: None     Inability: None    Transportation needs:     Medical: None     Non-medical: None   Tobacco Use    Smoking status: Never Smoker    Smokeless tobacco: Never Used   Substance and Sexual Activity    Alcohol use: No     Alcohol/week: 0.0 oz    Drug use: No    Sexual activity: None   Lifestyle    Physical activity:     Days per week: None     Minutes per session: None    Stress: None   Relationships    Social connections:     Talks on phone: None     Gets together: None     Attends Taoism service: None     Active member of club or organization: None     Attends meetings of clubs or organizations: None     Relationship status: None    Intimate partner violence:     Fear of current or ex partner: None     Emotionally abused: None     Physically abused: None     Forced sexual activity: None   Other Topics Concern    None   Social History Narrative    None       Current Outpatient Medications   Medication Sig Dispense Refill    divalproex (DEPAKOTE) 250 MG DR tablet Take 1 po bid . Total 1250 mg po bid 60 tablet 11    carBAMazepine (CARBATROL) 200 MG extended release capsule Take 4 po bid 240 capsule 11    divalproex (DEPAKOTE) 500 MG DR tablet Take 2 po bid.  Total 1250 mg po bid 120 tablet 11    naproxen (NAPROSYN) 500 MG tablet TAKE 1 TABLET TWICE A DAY 90 tablet 3    bumetanide (BUMEX) 1 MG tablet TAKE 1 TABLET DAILY 30 tablet 11  docusate sodium (COLACE, DULCOLAX) 100 MG CAPS Take 100 mg by mouth 2 times daily      divalproex (DEPAKOTE ER) 250 MG extended release tablet Take 250 mg by mouth 2 times daily      alendronate (FOSAMAX) 70 MG tablet TAKE 1 TABLET EVERY WEEK 4 tablet 5    CARBATROL 200 MG extended release capsule TAKE 4 CAPSULES TWICE A  capsule 0    Misc. Devices (BELT CLIP) MISC Use wheel chair safety belt as directed daily 1 each 0    enoxaparin (LOVENOX) 40 MG/0.4ML injection Inject 0.4 mLs into the skin daily  3    ketoconazole (NIZORAL) 2 % cream Apply topically daily. 60 g 3     No current facility-administered medications for this visit. No Known Allergies    Review of Systems   Constitutional: Negative. HENT: Negative. Eyes: Negative. Respiratory: Negative. Cardiovascular: Positive for leg swelling. Gastrointestinal: Negative. Endocrine: Negative. Genitourinary: Negative. Musculoskeletal: Positive for gait problem and myalgias. Skin: Negative. Allergic/Immunologic: Negative. Psychiatric/Behavioral: Negative. Objective:   Physical Exam    Vitals:    05/07/19 0858   BP: (!) 148/74   Pulse: 85          Neurological Examination in wheelchair  Constitutional .General exam well groomed   Head/Ears /Nose/Throat: external ear . Normal exam  Neck and thyroid . Normal size. No bruits  Respiratory . Breathsounds clear bilaterally  Cardiovascular: Auscultation of heart with regular rate and rhythm  Musculoskeletal. Muscle bulk and tone normal                                                           Muscle strength legs 4-/5 bilaterally .  Upper extremity 5/5 strength                                                                              No dysmetria or dysdiadokinesis  No tremor   Normal fine motor  Orientation Alert and oriented x 0  Attention and concentration reduced  Short term memory 0 words out of 3 in one minute   Language process normal . Dysarthria

## 2019-05-09 ENCOUNTER — OFFICE VISIT (OUTPATIENT)
Dept: PODIATRY | Age: 74
End: 2019-05-09
Payer: MEDICAID

## 2019-05-09 VITALS — RESPIRATION RATE: 16 BRPM | WEIGHT: 149 LBS | BODY MASS INDEX: 27.42 KG/M2 | HEIGHT: 62 IN

## 2019-05-09 DIAGNOSIS — I73.9 PERIPHERAL VASCULAR DISORDER (HCC): ICD-10-CM

## 2019-05-09 DIAGNOSIS — M79.672 BILATERAL FOOT PAIN: Primary | ICD-10-CM

## 2019-05-09 DIAGNOSIS — B35.1 DERMATOPHYTOSIS OF NAIL: ICD-10-CM

## 2019-05-09 DIAGNOSIS — M79.671 BILATERAL FOOT PAIN: Primary | ICD-10-CM

## 2019-05-09 PROCEDURE — 11721 DEBRIDE NAIL 6 OR MORE: CPT | Performed by: PODIATRIST

## 2019-05-09 PROCEDURE — 99213 OFFICE O/P EST LOW 20 MIN: CPT | Performed by: PODIATRIST

## 2019-05-09 NOTE — PROGRESS NOTES
[facility]  [facility] [address] [place] [address]  Dept: [phone]  Dept Fax: [phone]    NAIL PAIN PROGRESS NOTE  Date of patient's visit: 5/9/2019  Patient's Name:  Romana Brady YOB: 1945            Patient Care Team:  Peyton Brian MD as PCP - Daniella Anna MD as PCP - S Attributed Provider  Lorraine Liang DPM as Physician (Podiatry)      Chief Complaint   Patient presents with    Nail Problem     nail trim    Foot Pain    Foot Swelling       Subjective: This Romana Brady comes to clinic for foot and nail care. Pt currently has complaint of thickened, painful, elongated nails that he/she cannot manage by themselves. Pt. Relates pain to nails with shoe gear. Pt's primary care physician is Peyton Brian MD last seen 3/13/2019. Past Medical History:   Diagnosis Date    Anxiety     Arthritis     Cataracts, bilateral     Fracture (healed) treatment follow-up 2015    hand fx post fall    Mental retardation     Movement disorder     Primary osteoarthritis involving multiple joints 2/17/2017    Rheumatoid arthritis ([facility] [address]     Seizures ([facility])        No Known Allergies  Current Outpatient Medications on File Prior to Visit   Medication Sig Dispense Refill    divalproex (DEPAKOTE) 250 MG DR tablet Take 1 po bid . Total 1250 mg po bid 60 tablet 11    carBAMazepine (CARBATROL) 200 MG extended release capsule Take 4 po bid 240 capsule 11    divalproex (DEPAKOTE) 500 MG DR tablet Take 2 po bid. Total 1250 mg po bid 120 tablet 11    naproxen (NAPROSYN) 500 MG tablet TAKE 1 TABLET TWICE A DAY 90 tablet 3    Misc.  Devices (BELT CLIP) MISC Use wheel chair safety belt as directed daily 1 each 0    bumetanide (BUMEX) 1 MG tablet TAKE 1 TABLET DAILY 30 tablet 11    enoxaparin (LOVENOX) 40 MG/0.4ML injection Inject 0.4 mLs into the skin daily  3    docusate sodium (COLACE, DULCOLAX) 100 MG CAPS Take 100 mg by mouth 2 times daily      divalproex (DEPAKOTE ER) 250 MG extended release tablet Take 250 mg by mouth 2 times daily      alendronate (FOSAMAX) 70 MG tablet TAKE 1 TABLET EVERY WEEK 4 tablet 5    ketoconazole (NIZORAL) 2 % cream Apply topically daily. 60 g 3    CARBATROL 200 MG extended release capsule TAKE 4 CAPSULES TWICE A  capsule 0     No current facility-administered medications on file prior to visit. Review of Systems. Review of Systems:   History obtained from chart review and the patient  General ROS: negative for - chills, fatigue, fever, night sweats or weight gain  Constitutional: Negative for chills, diaphoresis, fatigue, fever and unexpected weight change. Musculoskeletal: Positive for arthralgias, gait problem and joint swelling. Neurological ROS: negative for - behavioral changes, confusion, headaches or seizures. Negative for weakness and numbness. Dermatological ROS: negative for - mole changes, rash  Cardiovascular: Negative for leg swelling. Gastrointestinal: Negative for constipation, diarrhea, nausea and vomiting. Objective:  General: AAO x 3 in NAD.     Derm  Toenail Description  Sites of Onychomycosis Involvement (Check affected area)  [x] [x] [x] [x] [x] [x] [x] [x] [x] [x]  5 4 3 2 1 1 2 3 4 5                          Right                                        Left    Thickness  [x] [x] [x] [x] [x] [x] [x] [x] [x] [x]  5 4 3 2 1 1 2 3 4 5                         Right                                        Left    Dystrophic Changes   [x] [x] [x] [x] [x] [x] [x] [x] [x] [x]  5 4 3 2 1 1 2 3 4 5                         Right                                        Left    Color  [x] [x] [x] [x] [x] [x] [x] [x] [x] [x]  5 4 3 2 1 1 2 3 4 5                          Right                                        Left    Incurvation/Ingrowin   [] [] [] [] [] [] [] [] [] []  5 4 3 2 1 1 2 3 4 5                         Right the office with any questions/problems/concerns. Other comorbidity noted and will be managed by PCP. Pain waiver discussed with patient and confirmed.    5/9/2019      Electronically signed by Shena Nesbitt DPM on 5/9/2019 at 8:46 AM  5/9/2019

## 2019-08-01 ENCOUNTER — OFFICE VISIT (OUTPATIENT)
Dept: PODIATRY | Age: 74
End: 2019-08-01
Payer: MEDICAID

## 2019-08-01 VITALS — RESPIRATION RATE: 16 BRPM | BODY MASS INDEX: 27.42 KG/M2 | WEIGHT: 149 LBS | HEIGHT: 62 IN

## 2019-08-01 DIAGNOSIS — M19.071 DEGENERATIVE JOINT DISEASE OF BOTH ANKLES AND FEET: ICD-10-CM

## 2019-08-01 DIAGNOSIS — M79.672 BILATERAL FOOT PAIN: Primary | ICD-10-CM

## 2019-08-01 DIAGNOSIS — M79.671 BILATERAL FOOT PAIN: Primary | ICD-10-CM

## 2019-08-01 DIAGNOSIS — M19.072 DEGENERATIVE JOINT DISEASE OF BOTH ANKLES AND FEET: ICD-10-CM

## 2019-08-01 DIAGNOSIS — B35.1 DERMATOPHYTOSIS OF NAIL: ICD-10-CM

## 2019-08-01 DIAGNOSIS — I73.9 PERIPHERAL VASCULAR DISORDER (HCC): ICD-10-CM

## 2019-08-01 PROCEDURE — 11721 DEBRIDE NAIL 6 OR MORE: CPT | Performed by: PODIATRIST

## 2019-08-01 PROCEDURE — 99213 OFFICE O/P EST LOW 20 MIN: CPT | Performed by: PODIATRIST

## 2019-09-10 RX ORDER — NAPROXEN 500 MG/1
TABLET ORAL
Qty: 90 TABLET | Refills: 3 | Status: SHIPPED | OUTPATIENT
Start: 2019-09-10 | End: 2020-01-02

## 2019-09-17 ENCOUNTER — TELEPHONE (OUTPATIENT)
Dept: FAMILY MEDICINE CLINIC | Age: 74
End: 2019-09-17

## 2019-09-17 DIAGNOSIS — Z00.00 HEALTHCARE MAINTENANCE: Primary | ICD-10-CM

## 2019-09-19 ENCOUNTER — HOSPITAL ENCOUNTER (OUTPATIENT)
Dept: MAMMOGRAPHY | Age: 74
Discharge: HOME | End: 2019-09-21
Payer: MEDICAID

## 2019-09-19 DIAGNOSIS — Z00.00 HEALTHCARE MAINTENANCE: ICD-10-CM

## 2019-09-19 DIAGNOSIS — Z12.31 VISIT FOR SCREENING MAMMOGRAM: ICD-10-CM

## 2019-09-19 PROCEDURE — 77067 SCR MAMMO BI INCL CAD: CPT

## 2019-09-19 PROCEDURE — G0279 TOMOSYNTHESIS, MAMMO: HCPCS

## 2019-10-03 ENCOUNTER — OFFICE VISIT (OUTPATIENT)
Dept: PODIATRY | Age: 74
End: 2019-10-03
Payer: MEDICAID

## 2019-10-03 DIAGNOSIS — M79.672 BILATERAL FOOT PAIN: Primary | ICD-10-CM

## 2019-10-03 DIAGNOSIS — I73.9 PERIPHERAL VASCULAR DISORDER (HCC): ICD-10-CM

## 2019-10-03 DIAGNOSIS — B35.1 DERMATOPHYTOSIS OF NAIL: ICD-10-CM

## 2019-10-03 DIAGNOSIS — L85.3 XEROSIS OF SKIN: ICD-10-CM

## 2019-10-03 DIAGNOSIS — I73.9 PVD (PERIPHERAL VASCULAR DISEASE) (HCC): ICD-10-CM

## 2019-10-03 DIAGNOSIS — M79.671 BILATERAL FOOT PAIN: Primary | ICD-10-CM

## 2019-10-03 PROCEDURE — 11721 DEBRIDE NAIL 6 OR MORE: CPT | Performed by: PODIATRIST

## 2019-10-03 PROCEDURE — 99213 OFFICE O/P EST LOW 20 MIN: CPT | Performed by: PODIATRIST

## 2019-11-07 ENCOUNTER — OFFICE VISIT (OUTPATIENT)
Dept: FAMILY MEDICINE CLINIC | Age: 74
End: 2019-11-07
Payer: MEDICAID

## 2019-11-07 VITALS — OXYGEN SATURATION: 95 % | HEART RATE: 66 BPM | SYSTOLIC BLOOD PRESSURE: 130 MMHG | DIASTOLIC BLOOD PRESSURE: 60 MMHG

## 2019-11-07 DIAGNOSIS — H61.23 BILATERAL IMPACTED CERUMEN: ICD-10-CM

## 2019-11-07 DIAGNOSIS — M15.9 PRIMARY OSTEOARTHRITIS INVOLVING MULTIPLE JOINTS: ICD-10-CM

## 2019-11-07 DIAGNOSIS — G40.909 SEIZURE DISORDER (HCC): Primary | Chronic | ICD-10-CM

## 2019-11-07 PROCEDURE — 90688 IIV4 VACCINE SPLT 0.5 ML IM: CPT | Performed by: FAMILY MEDICINE

## 2019-11-07 PROCEDURE — 99213 OFFICE O/P EST LOW 20 MIN: CPT | Performed by: FAMILY MEDICINE

## 2019-11-07 PROCEDURE — 90471 IMMUNIZATION ADMIN: CPT | Performed by: FAMILY MEDICINE

## 2019-11-07 ASSESSMENT — ENCOUNTER SYMPTOMS
SINUS PRESSURE: 0
VOMITING: 0
BACK PAIN: 0
COUGH: 0
SHORTNESS OF BREATH: 0
NAUSEA: 0
DIARRHEA: 0
SORE THROAT: 0

## 2019-12-10 ENCOUNTER — OFFICE VISIT (OUTPATIENT)
Dept: PODIATRY | Age: 74
End: 2019-12-10
Payer: MEDICAID

## 2019-12-10 VITALS — WEIGHT: 150 LBS | RESPIRATION RATE: 16 BRPM | HEIGHT: 63 IN | BODY MASS INDEX: 26.58 KG/M2

## 2019-12-10 DIAGNOSIS — B35.1 DERMATOPHYTOSIS OF NAIL: ICD-10-CM

## 2019-12-10 DIAGNOSIS — M79.671 BILATERAL FOOT PAIN: Primary | ICD-10-CM

## 2019-12-10 DIAGNOSIS — R60.0 EDEMA OF LOWER EXTREMITY: ICD-10-CM

## 2019-12-10 DIAGNOSIS — M79.672 BILATERAL FOOT PAIN: Primary | ICD-10-CM

## 2019-12-10 DIAGNOSIS — I73.9 PERIPHERAL VASCULAR DISORDER (HCC): ICD-10-CM

## 2019-12-10 PROCEDURE — 11721 DEBRIDE NAIL 6 OR MORE: CPT | Performed by: PODIATRIST

## 2019-12-10 PROCEDURE — 99213 OFFICE O/P EST LOW 20 MIN: CPT | Performed by: PODIATRIST

## 2020-01-02 RX ORDER — NAPROXEN 500 MG/1
TABLET ORAL
Qty: 56 TABLET | Refills: 3 | Status: SHIPPED | OUTPATIENT
Start: 2020-01-02 | End: 2020-01-29

## 2020-01-02 RX ORDER — BUMETANIDE 1 MG/1
TABLET ORAL
Qty: 28 TABLET | Refills: 2 | Status: SHIPPED | OUTPATIENT
Start: 2020-01-02 | End: 2020-03-20

## 2020-01-29 ENCOUNTER — TELEPHONE (OUTPATIENT)
Dept: FAMILY MEDICINE CLINIC | Age: 75
End: 2020-01-29

## 2020-01-29 ENCOUNTER — APPOINTMENT (OUTPATIENT)
Dept: GENERAL RADIOLOGY | Age: 75
End: 2020-01-29
Payer: MEDICAID

## 2020-01-29 ENCOUNTER — HOSPITAL ENCOUNTER (EMERGENCY)
Age: 75
Discharge: HOME | End: 2020-01-29
Attending: EMERGENCY MEDICINE
Payer: MEDICAID

## 2020-01-29 VITALS
DIASTOLIC BLOOD PRESSURE: 76 MMHG | SYSTOLIC BLOOD PRESSURE: 172 MMHG | OXYGEN SATURATION: 98 % | RESPIRATION RATE: 14 BRPM | HEART RATE: 80 BPM | WEIGHT: 150 LBS | TEMPERATURE: 97.4 F | BODY MASS INDEX: 26.57 KG/M2

## 2020-01-29 PROCEDURE — 99283 EMERGENCY DEPT VISIT LOW MDM: CPT

## 2020-01-29 PROCEDURE — 73130 X-RAY EXAM OF HAND: CPT

## 2020-01-29 PROCEDURE — 29125 APPL SHORT ARM SPLINT STATIC: CPT

## 2020-01-29 PROCEDURE — 73110 X-RAY EXAM OF WRIST: CPT

## 2020-01-29 RX ORDER — NAPROXEN 500 MG/1
500 TABLET ORAL 2 TIMES DAILY WITH MEALS
Qty: 20 TABLET | Refills: 0 | Status: SHIPPED | OUTPATIENT
Start: 2020-01-29 | End: 2020-04-17

## 2020-01-29 RX ORDER — PSEUDOEPHEDRINE HCL 30 MG
100 TABLET ORAL 2 TIMES DAILY PRN
Qty: 60 CAPSULE | Refills: 5 | Status: SHIPPED | OUTPATIENT
Start: 2020-01-29 | End: 2020-09-03

## 2020-01-29 ASSESSMENT — PAIN SCALES - GENERAL
PAINLEVEL_OUTOF10: 6
PAINLEVEL_OUTOF10: 4

## 2020-01-29 NOTE — TELEPHONE ENCOUNTER
[facility] Transitions Initial Follow Up Call    Outreach made within 2 business days of discharge: Yes    Patient: Destiny Mccarthy Patient : 1945   MRN: [id number]  Reason for Admission: There are no discharge diagnoses documented for the most recent discharge. Discharge Date: 20       Spoke with: CARE GIVER, NO F/U NEEDED AT THIS TIME    Discharge department/facility: [facility] Interactive Patient Contact:  Was patient able to fill all prescriptions: Yes  Was patient instructed to bring all medications to the follow-up visit: N/A  Is patient taking all medications as directed in the discharge summary?  Yes  Does patient understand their discharge instructions: Yes  Does patient have questions or concerns that need addressed prior to 7-14 day follow up office visit: yes - refill sent    Scheduled appointment with PCP within 7-14 days    Follow Up  Future Appointments   Date Time Provider Jv Marrufo   2020  8:30 AM [id number]   3/10/2020  9:15 AM Janice Lefort, DPM [facility]   2020  8:30 AM [facility]   2020  8:40 AM Sophie Alan MD Neuro Spec [facility]

## 2020-01-29 NOTE — ED PROVIDER NOTES
The patient was seen and examined by me in conjunction with the mid-level provider. I agree with his/her assessment and treatment plan. X-ray findings discussed with the caregiver and the patient and she will follow-up with orthopedics.      Gadiel Ramos MD  01/29/20 4453
Hands:    Skin:     General: Skin is warm. Findings: No rash. Neurological:      Mental Status: She is alert and oriented to person, place, and time. Psychiatric:         Behavior: Behavior normal.                 DIAGNOSTIC RESULTS     EKG: All EKG's are interpreted by the Emergency Department Physician who either signs or Co-signs this chart in the absence of a cardiologist.        RADIOLOGY:   Non-plain film images such as CT, Ultrasound and MRI are read by the radiologist. Plain radiographic images arevisualized and preliminarily interpreted by the emergency physician with the below findings:        Interpretation per the Radiologist below, if available at thetime of this note:          ED BEDSIDE ULTRASOUND:   Performed by ED Physician - none    LABS:  Labs Reviewed - No data to display    All other labs were within normal range or not returned as of this dictation. EMERGENCY DEPARTMENT COURSE and DIFFERENTIAL DIAGNOSIS/MDM:   Vitals:    Vitals:    01/29/20 0945   BP: (!) 172/76   Pulse: 80   Resp: 14   Temp: 97.4 °F (36.3 °C)   TempSrc: Oral   SpO2: 98%   Weight: 150 lb (68 kg)       X-rays suggest a right third and fourth metacarpal fracture. Patient given a volar splint discharged home orthopedic referral.  Patient did not want any pain meds. Pre-hypertension/Hypertension: The patient has been informed that they may have pre-hypertension or Hypertension based on a blood pressure reading in the emergency department. I recommend that the patient call the primary care provider listed on their discharge instructions or a physician of their choice this week to arrange follow up for further evaluation of possible pre-hypertension or Hypertension. Right upper extremity volar splint well placed by nursing staff.   Post application examination by me reveals that it is appropriately placed and the right upper  extremity is neuro/vasc

## 2020-01-29 NOTE — ED NOTES
ASSESSMENT:    Presents to ED per own w/c with care giver. From Group Home. C/o pain, swelling and bruising across dorsum of rt hand and also abrasion to mid forehead. sujata rently fell yest. Transferring from her w/c to her bed. denies being dizzy or lightheaded at time of fall. Uses walker also at home. No previous rt hand injury. On admission is alert and oriented. Answers questions approp. no other njury or c/o pain.      Shanel Diaz RN  01/29/20 1952

## 2020-01-30 ENCOUNTER — OFFICE VISIT (OUTPATIENT)
Dept: ORTHOPEDIC SURGERY | Age: 75
End: 2020-01-30
Payer: MEDICAID

## 2020-01-30 VITALS — BODY MASS INDEX: 26.56 KG/M2 | WEIGHT: 149.91 LBS | HEIGHT: 63 IN

## 2020-01-30 PROCEDURE — 99203 OFFICE O/P NEW LOW 30 MIN: CPT | Performed by: ORTHOPAEDIC SURGERY

## 2020-02-01 NOTE — PROGRESS NOTES
Dispense Refill    naproxen (NAPROSYN) 500 MG tablet Take 1 tablet by mouth 2 times daily (with meals) 20 tablet 0    docusate (COLACE, DULCOLAX) 100 MG CAPS Take 100 mg by mouth 2 times daily as needed (constipation) 60 capsule 5    bumetanide (BUMEX) 1 MG tablet TAKE 1 TABLET BY MOUTH DAILY 28 tablet 2    carBAMazepine (CARBATROL) 200 MG extended release capsule Take 4 po bid 240 capsule 11    divalproex (DEPAKOTE) 500 MG DR tablet Take 2 po bid. Total 1250 mg po bid 120 tablet 11    divalproex (DEPAKOTE ER) 250 MG extended release tablet Take 250 mg by mouth 2 times daily      alendronate (FOSAMAX) 70 MG tablet TAKE 1 TABLET EVERY WEEK 4 tablet 5    CARBATROL 200 MG extended release capsule TAKE 4 CAPSULES TWICE A  capsule 0     No current facility-administered medications for this visit. Allergies:    Patient has no known allergies.     Social History:   Social History     Socioeconomic History    Marital status: Single     Spouse name: Not on file    Number of children: Not on file    Years of education: Not on file    Highest education level: Not on file   Occupational History    Not on file   Social Needs    Financial resource strain: Not on file    Food insecurity:     Worry: Not on file     Inability: Not on file    Transportation needs:     Medical: Not on file     Non-medical: Not on file   Tobacco Use    Smoking status: Never Smoker    Smokeless tobacco: Never Used   Substance and Sexual Activity    Alcohol use: No     Alcohol/week: 0.0 standard drinks    Drug use: No    Sexual activity: Not on file   Lifestyle    Physical activity:     Days per week: Not on file     Minutes per session: Not on file    Stress: Not on file   Relationships    Social connections:     Talks on phone: Not on file     Gets together: Not on file     Attends Shinto service: Not on file     Active member of club or organization: Not on file     Attends meetings of clubs or organizations: Not on file     Relationship status: Not on file    Intimate partner violence:     Fear of current or ex partner: Not on file     Emotionally abused: Not on file     Physically abused: Not on file     Forced sexual activity: Not on file   Other Topics Concern    Not on file   Social History Narrative    Not on file       Family History:  Family History   Family history unknown: Yes         REVIEW OF SYSTEMS:  Review of Systems    Gen: no fever, chills, malaise  CV: no chest pain or palpitations  Resp: no cough or shortness of breath  GI: no nausea, vomiting, diarrhea, or constipation  Neuro: no numbness, tingling, or weakness  Msk: Pain at the right hand. 10 remaining systems reviewed and negative      PHYSICAL EXAM:  Ht 5' 2.99\" (1.6 m)   Wt 149 lb 14.6 oz (68 kg)   BMI 26.56 kg/m² Body mass index is 26.56 kg/m². Physical Exam  Gen: alert and oriented, no acute distress  Psych: Appropriate affect; Appropriate knowledge base; Appropriate mood; No hallucinations  Head: normocephalic atraumatic   Chest: symmetric chest excursion  Ortho Exam  MSK: Right upper extremity:  Significant swelling noted at the dorsum of the right hand with diffuse ecchymosis of the same side. Skin is overall intact without signs of open injury. Tenderness to palpation 1st-4th metacarpal necks. Minimal range of motion secondary to swelling and pain but cascade is overall intact. Residual indentation of a ring appreciated on the ring finger. Sensations intact light touch C5-T1. Radial/ulnar/median/AIN/PIN motor nerves are intact. Radial pulse 2+. Radiology:   None taken at clinic. ASSESSMENT:  69 y.o. female with right third and fourth metacarpal neck fractures. PLAN:   I reviewed the physical exam findings and previous imaging with the patient and I informed them that she sustained a right third and fourth metacarpal neck fracture following fall from bed.   Discussed etiologies and natural histories of metacarpal

## 2020-02-10 ENCOUNTER — OFFICE VISIT (OUTPATIENT)
Dept: FAMILY MEDICINE CLINIC | Age: 75
End: 2020-02-10
Payer: MEDICAID

## 2020-02-10 VITALS — OXYGEN SATURATION: 90 % | HEART RATE: 70 BPM | DIASTOLIC BLOOD PRESSURE: 70 MMHG | SYSTOLIC BLOOD PRESSURE: 130 MMHG

## 2020-02-10 PROCEDURE — 99213 OFFICE O/P EST LOW 20 MIN: CPT | Performed by: FAMILY MEDICINE

## 2020-02-10 ASSESSMENT — PATIENT HEALTH QUESTIONNAIRE - PHQ9
SUM OF ALL RESPONSES TO PHQ9 QUESTIONS 1 & 2: 0
2. FEELING DOWN, DEPRESSED OR HOPELESS: 0
SUM OF ALL RESPONSES TO PHQ QUESTIONS 1-9: 0
1. LITTLE INTEREST OR PLEASURE IN DOING THINGS: 0
SUM OF ALL RESPONSES TO PHQ QUESTIONS 1-9: 0

## 2020-02-10 ASSESSMENT — ENCOUNTER SYMPTOMS
COUGH: 0
VOMITING: 0
NAUSEA: 0
DIARRHEA: 0
SHORTNESS OF BREATH: 0
SORE THROAT: 0
SINUS PRESSURE: 0
BACK PAIN: 0

## 2020-02-10 NOTE — PROGRESS NOTES
 divalproex (DEPAKOTE) 500 MG DR tablet Take 2 po bid. Total 1250 mg po bid 120 tablet 11    divalproex (DEPAKOTE ER) 250 MG extended release tablet Take 250 mg by mouth 2 times daily      alendronate (FOSAMAX) 70 MG tablet TAKE 1 TABLET EVERY WEEK 4 tablet 5    CARBATROL 200 MG extended release capsule TAKE 4 CAPSULES TWICE A  capsule 0     No current facility-administered medications for this visit. No Known Allergies      Subjective:   Review of Systems   Constitutional: Negative for chills, diaphoresis and fever. HENT: Negative for congestion, sinus pressure and sore throat. Eyes: Negative for visual disturbance. Respiratory: Negative for cough and shortness of breath. Cardiovascular: Positive for leg swelling. Negative for chest pain. Gastrointestinal: Negative for diarrhea, nausea and vomiting. Genitourinary: Negative for dysuria, menstrual problem, urgency and vaginal discharge. Musculoskeletal: Positive for arthralgias and gait problem. Negative for back pain and myalgias. Skin: Positive for wound (right arm in cast from previous fx). Negative for rash. Neurological: Negative for weakness, numbness and headaches. Psychiatric/Behavioral: Negative for decreased concentration, dysphoric mood and sleep disturbance. The patient is not nervous/anxious.        :   /70   Pulse 70   SpO2 90%     Physical Exam  Constitutional:       General: She is not in acute distress. Appearance: She is well-developed. She is not diaphoretic. HENT:      Head: Normocephalic and atraumatic. Mouth/Throat:      Pharynx: No oropharyngeal exudate. Eyes:      General: No scleral icterus. Neck:      Musculoskeletal: Neck supple. Vascular: No carotid bruit. Cardiovascular:      Heart sounds: No murmur. No friction rub. No gallop. Pulmonary:      Effort: No respiratory distress. Breath sounds: No wheezing or rales. Chest:      Chest wall: No tenderness. Musculoskeletal:      Right lower leg: Edema present. Left lower leg: Edema present. Lymphadenopathy:      Cervical: No cervical adenopathy. Skin:     Findings: No rash. Neurological:      Mental Status: She is alert and oriented to person, place, and time. Cranial Nerves: No cranial nerve deficit. Coordination: Coordination abnormal.      Gait: Gait abnormal.   Psychiatric:         Behavior: Behavior normal.         Thought Content: Thought content normal.         Judgment: Judgment normal.         Assessment:       Diagnosis Orders   1. Hypokalemia  CBC Auto Differential    Comprehensive Metabolic Panel   2. Confusion  CBC Auto Differential    Comprehensive Metabolic Panel    Lipid Panel         Plan:      No follow-ups on file. Orders Placed This Encounter   Procedures    CBC Auto Differential     Standing Status:   Future     Standing Expiration Date:   2/10/2021    Comprehensive Metabolic Panel     Standing Status:   Future     Standing Expiration Date:   2/10/2021    Lipid Panel     Standing Status:   Future     Standing Expiration Date:   2/10/2021     Order Specific Question:   Is Patient Fasting?/# of Hours     Answer:   12 hour fast     No orders of the defined types were placed in this encounter. Her confusion seems to have cleared and she seems to be at her baseline. Labs to be checked to make sure stable.

## 2020-02-12 ENCOUNTER — HOSPITAL ENCOUNTER (OUTPATIENT)
Age: 75
Setting detail: SPECIMEN
Discharge: HOME | End: 2020-02-12
Payer: MEDICAID

## 2020-02-12 LAB
ABSOLUTE EOS #: 0.13 K/UL (ref 0–0.44)
ABSOLUTE IMMATURE GRANULOCYTE: <0.03 K/UL (ref 0–0.3)
ABSOLUTE LYMPH #: 0.88 K/UL (ref 1.1–3.7)
ABSOLUTE MONO #: 0.22 K/UL (ref 0.1–1.2)
ALBUMIN SERPL-MCNC: 3.8 G/DL (ref 3.5–5.2)
ALBUMIN/GLOBULIN RATIO: 1.5 (ref 1–2.5)
ALP BLD-CCNC: 84 U/L (ref 35–104)
ALT SERPL-CCNC: 8 U/L (ref 5–33)
ANION GAP SERPL CALCULATED.3IONS-SCNC: 13 MMOL/L (ref 9–17)
AST SERPL-CCNC: 13 U/L
BASOPHILS # BLD: 1 % (ref 0–2)
BASOPHILS ABSOLUTE: 0.04 K/UL (ref 0–0.2)
BILIRUB SERPL-MCNC: 0.23 MG/DL (ref 0.3–1.2)
BUN BLDV-MCNC: 30 MG/DL (ref 8–23)
BUN/CREAT BLD: ABNORMAL (ref 9–20)
CALCIUM SERPL-MCNC: 9 MG/DL (ref 8.6–10.4)
CHLORIDE BLD-SCNC: 108 MMOL/L (ref 98–107)
CHOLESTEROL/HDL RATIO: 2.7
CHOLESTEROL: 224 MG/DL
CO2: 25 MMOL/L (ref 20–31)
CREAT SERPL-MCNC: 0.52 MG/DL (ref 0.5–0.9)
DIFFERENTIAL TYPE: ABNORMAL
EOSINOPHILS RELATIVE PERCENT: 5 % (ref 1–4)
GFR AFRICAN AMERICAN: >60 ML/MIN
GFR NON-AFRICAN AMERICAN: >60 ML/MIN
GFR SERPL CREATININE-BSD FRML MDRD: ABNORMAL ML/MIN/{1.73_M2}
GFR SERPL CREATININE-BSD FRML MDRD: ABNORMAL ML/MIN/{1.73_M2}
GLUCOSE BLD-MCNC: 77 MG/DL (ref 70–99)
HCT VFR BLD CALC: 42.5 % (ref 36.3–47.1)
HDLC SERPL-MCNC: 84 MG/DL
HEMOGLOBIN: 13.3 G/DL (ref 11.9–15.1)
IMMATURE GRANULOCYTES: 1 %
LDL CHOLESTEROL: 120 MG/DL (ref 0–130)
LYMPHOCYTES # BLD: 31 % (ref 24–43)
MCH RBC QN AUTO: 32.7 PG (ref 25.2–33.5)
MCHC RBC AUTO-ENTMCNC: 31.3 G/DL (ref 28.4–34.8)
MCV RBC AUTO: 104.4 FL (ref 82.6–102.9)
MONOCYTES # BLD: 8 % (ref 3–12)
NRBC AUTOMATED: 0 PER 100 WBC
PDW BLD-RTO: 13.4 % (ref 11.8–14.4)
PLATELET # BLD: 196 K/UL (ref 138–453)
PLATELET ESTIMATE: ABNORMAL
PMV BLD AUTO: 12.4 FL (ref 8.1–13.5)
POTASSIUM SERPL-SCNC: 4.5 MMOL/L (ref 3.7–5.3)
RBC # BLD: 4.07 M/UL (ref 3.95–5.11)
RBC # BLD: ABNORMAL 10*6/UL
SEG NEUTROPHILS: 54 % (ref 36–65)
SEGMENTED NEUTROPHILS ABSOLUTE COUNT: 1.55 K/UL (ref 1.5–8.1)
SODIUM BLD-SCNC: 146 MMOL/L (ref 135–144)
TOTAL PROTEIN: 6.3 G/DL (ref 6.4–8.3)
TRIGL SERPL-MCNC: 101 MG/DL
VLDLC SERPL CALC-MCNC: ABNORMAL MG/DL (ref 1–30)
WBC # BLD: 2.8 K/UL (ref 3.5–11.3)
WBC # BLD: ABNORMAL 10*3/UL

## 2020-02-13 ENCOUNTER — OFFICE VISIT (OUTPATIENT)
Dept: ORTHOPEDIC SURGERY | Age: 75
End: 2020-02-13
Payer: MEDICAID

## 2020-02-13 VITALS — WEIGHT: 149.91 LBS | HEIGHT: 63 IN | BODY MASS INDEX: 26.56 KG/M2

## 2020-02-13 PROBLEM — S62.332A: Status: ACTIVE | Noted: 2020-02-13

## 2020-02-13 PROBLEM — S62.334A: Status: ACTIVE | Noted: 2020-02-13

## 2020-02-13 PROCEDURE — 99213 OFFICE O/P EST LOW 20 MIN: CPT | Performed by: ORTHOPAEDIC SURGERY

## 2020-02-13 NOTE — PROGRESS NOTES
[facility]  [facility]  [phone] [address]  Dept: [phone]  Dept Fax: [phone]        Orthopaedic Trauma Clinic Follow Up      Subjective:   Date of Injury: 1/29/2020    Stuart Chaney is a 69y.o. year old female who presents to the clinic today for routine followup regarding her right third and fourth metacarpal neck fractures. Therefore we are roughly 2 weeks out from date of injury. She was seen in our office today after the injury date and she was placed into a short arm cast.  Cast appear to be in good condition today in office and was removed by our staff. In office today she does not have any specific complaints of pain and feels that she is doing well. She does not endorse any numbness/tingling to her extremity. She has no other orthopedic complaints at this time. Review of Systems  Gen: no fever, chills, malaise  CV: no chest pain or palpitations  Resp: no cough or shortness of breath  GI: no nausea, vomiting, diarrhea, or constipation  Neuro: no numbness, tingling, or weakness  Msk: Right hand pain  10 remaining systems reviewed and negative    Objective : There were no vitals filed for this visit. Body mass index is 26.56 kg/m². General: No acute distress, resting comfortably in the clinic  Neuro: alert. oriented  Eyes: Extra-ocular muscles intact  Pulm: Respirations unlabored and regular. Skin: warm, well perfused  Psych:   Patient has good fund of knowledge and displays understanging of exam, diagnosis, and plan. MSK:  RUE: Extensive ecchymosis noted over the dorsum and volar surface of the right hand and fingers. Skin intact. Minimal tender to palpation over third and fourth MCP regions with no crepitance or appreciated instability. Able to make full fist without pain. Compartments soft. Ulnar/Median/AIN/PIN motor intact. C4-T1 SILT.   Radial pulse 2+ with BCR      Radiology:  History: Right third and fourth metacarpal neck

## 2020-03-10 ENCOUNTER — OFFICE VISIT (OUTPATIENT)
Dept: ORTHOPEDIC SURGERY | Age: 75
End: 2020-03-10
Payer: MEDICAID

## 2020-03-10 VITALS — HEIGHT: 63 IN | WEIGHT: 149.91 LBS | BODY MASS INDEX: 26.56 KG/M2

## 2020-03-10 PROCEDURE — 99213 OFFICE O/P EST LOW 20 MIN: CPT | Performed by: STUDENT IN AN ORGANIZED HEALTH CARE EDUCATION/TRAINING PROGRAM

## 2020-03-10 NOTE — PROGRESS NOTES
[facility]  [facility]  [address] [address]  Dept: [phone]  Dept Fax: [phone]        Orthopaedic Trauma Clinic Follow Up      Subjective:   Date of Injury: 1/29/20    Cha Kim is a 69y.o. year old female who presents to the clinic today for routine followup regarding her right third and fourth metacarpal neck fractures. Patient was last seen on February 13, 2020 where conservative treatment was continued for her fractures. She was placed a new cast at that time and presents for follow-up. Caretaker is at bedside, she states she has been doing well not complaining of much pain. Review of Systems  Gen: no fever, chills, malaise  CV: no chest pain or palpitations  Resp: no cough or shortness of breath  GI: no nausea, vomiting, diarrhea, or constipation  Neuro: no numbness, tingling, or weakness  RUE: Denies interval increase in pain, or increase in numbness and tingling to the right hand  10 remaining systems reviewed and negative    Objective : There were no vitals filed for this visit. Body mass index is 26.56 kg/m². General: No acute distress, resting comfortably in the clinic  Neuro: alert. oriented  Eyes: Extra-ocular muscles intact  Pulm: Respirations unlabored and regular. Skin: warm, well perfused  Psych:   Patient has good fund of knowledge and displays understanging of exam, diagnosis, and plan. RUE: Skin intact, ecchymosis noted to the third fourth and fifth digits. No significant tenderness palpation of the third and fourth metacarpal necks. Patient is able to actively flex and extend the digits and make a full fist.  There is no significant tenderness to palpation to the third and fourth metacarpal necks. She endorses generalized sensation to the fingers. Her fingers are warm and well-perfused. Her radial pulses 2+.     Radiology:  History: Right third and fourth metacarpal neck fractures sustained on January 29, 2020    Comparison: Radiographs from February 13, 2020 available for comparison    Findings: 3 views of the right hand including AP, oblique, and lateral radiographs demonstrate previous third and fourth metacarpal neck fractures with maintained alignment and no interval displacement or increase in angulation compared to previous radiographs. Mild interval callus formation is appreciated. Impression: Healing right third and fourth metacarpal neck fractures     Assessment:   69y.o. year old female with right third and fourth metacarpal neck fractures  Plan:   1. Patient is done well with conservative treatment and casting of her right third and fourth metacarpal neck fractures. Clinically, she has no signs of pain or decrease in function. 2.  We felt this time that she no longer is in need of formal immobilization. She may perform her activities and range of motion as tolerated to the right hand begin using her right hand immediately for activities of daily living without restriction. 3.  We will see her back as needed for this issue she is encouraged to call return to the office sooner with questions or concerns. Questions were answered her satisfaction, she is aware, stands, and voices her willingness to proceed as discussed. Follow up:Return if symptoms worsen or fail to improve. No orders of the defined types were placed in this encounter. No orders of the defined types were placed in this encounter.       Electronically signed by Sergio Alva DO on 3/10/2020 at 2:43 PM

## 2020-04-17 RX ORDER — CARBAMAZEPINE 200 MG/1
CAPSULE, EXTENDED RELEASE ORAL
Qty: 720 CAPSULE | Refills: 3 | Status: SHIPPED | OUTPATIENT
Start: 2020-04-17 | End: 2020-08-31 | Stop reason: SDUPTHER

## 2020-04-17 RX ORDER — DIVALPROEX SODIUM 250 MG/1
TABLET, DELAYED RELEASE ORAL
Qty: 60 TABLET | Refills: 3 | Status: SHIPPED | OUTPATIENT
Start: 2020-04-17 | End: 2020-08-07

## 2020-04-17 RX ORDER — NAPROXEN 500 MG/1
TABLET ORAL
Qty: 60 TABLET | Refills: 5 | Status: SHIPPED | OUTPATIENT
Start: 2020-04-17 | End: 2020-10-01

## 2020-04-17 RX ORDER — DIVALPROEX SODIUM 500 MG/1
TABLET, DELAYED RELEASE ORAL
Qty: 120 TABLET | Refills: 3 | Status: SHIPPED | OUTPATIENT
Start: 2020-04-17 | End: 2020-08-07

## 2020-04-17 NOTE — TELEPHONE ENCOUNTER
Next Visit Date:  Future Appointments   Date Time Provider Jv Marrufo   4/23/2020  9:00 AM [facility]   5/6/2020  8:30 [facility]   5/7/2020  8:40 AM Aden Garsia MD [facility] [address] Maintenance   Topic Date Due    Hepatitis C screen  1945    DTaP/Tdap/Td vaccine (1 - Tdap) 06/05/1965    Shingles Vaccine (1 of 2) 06/05/1996    Colon Cancer Screen FIT/FOBT  09/02/2017    Potassium monitoring  02/12/2021    Creatinine monitoring  02/12/2021    Breast cancer screen  09/19/2021    Lipid screen  02/12/2025    DEXA (modify frequency per FRAX score)  Completed    Flu vaccine  Completed    Pneumococcal 65+ years Vaccine  Completed    Hepatitis A vaccine  Aged Out    Hepatitis B vaccine  Aged Out    Hib vaccine  Aged Out    Meningococcal (ACWY) vaccine  Aged Out       No results found for: LABA1C          ( goal A1C is < 7)   No results found for: LABMICR  LDL Cholesterol (mg/dL)   Date Value   02/12/2020 120   03/07/2019 91       (goal LDL is <100)   AST (U/L)   Date Value   02/12/2020 13     ALT (U/L)   Date Value   02/12/2020 8     BUN (mg/dL)   Date Value   02/12/2020 30 (H)     BP Readings from Last 3 Encounters:   02/10/20 130/70   01/29/20 (!) 172/76   11/07/19 130/60          (goal 120/80)    All Future Testing planned in [facility]  Lab Frequency Next Occurrence               Patient Active Problem List:     Seizure disorder     Mental retardation     Anxiety     Hip fracture (HCC)     Hypokalemia     Primary osteoarthritis involving multiple joints     Closed right tibial fracture, closed, initial encounter     Constipation     Age-related osteoporosis with current pathological fracture with routine healing     Closed fracture of neck of third metacarpal bone of right hand, initial encounter     Closed fracture of neck of fourth metacarpal bone of right hand, initial encounter

## 2020-08-07 RX ORDER — DIVALPROEX SODIUM 250 MG/1
TABLET, DELAYED RELEASE ORAL
Qty: 56 TABLET | Refills: 0 | Status: SHIPPED | OUTPATIENT
Start: 2020-08-15 | End: 2020-09-04

## 2020-08-07 RX ORDER — DIVALPROEX SODIUM 500 MG/1
TABLET, DELAYED RELEASE ORAL
Qty: 112 TABLET | Refills: 0 | Status: SHIPPED | OUTPATIENT
Start: 2020-08-15 | End: 2020-09-04

## 2020-08-07 NOTE — TELEPHONE ENCOUNTER
Pharmacy requesting refill of Divalproex 250 mg and 500 mg.       Medication active on med list: yes for both      Date of last order for both: 4/17/20 for a 4 month supply    verified on 8/7/2020  verified by Rito Sears LPN      Date of last appointment 5/7/2019    Next Visit Date:  9/9/2020

## 2020-08-11 ENCOUNTER — OFFICE VISIT (OUTPATIENT)
Dept: PODIATRY | Age: 75
End: 2020-08-11
Payer: MEDICAID

## 2020-08-11 VITALS — HEIGHT: 62 IN | BODY MASS INDEX: 27.42 KG/M2 | TEMPERATURE: 97.5 F | WEIGHT: 149 LBS | RESPIRATION RATE: 16 BRPM

## 2020-08-11 PROCEDURE — 99213 OFFICE O/P EST LOW 20 MIN: CPT | Performed by: PODIATRIST

## 2020-08-11 PROCEDURE — 11721 DEBRIDE NAIL 6 OR MORE: CPT | Performed by: PODIATRIST

## 2020-08-11 NOTE — PROGRESS NOTES
[facility]  [facility]  [address] [place] [address]  Dept: [phone]  Dept Fax: [phone]     PAIN PROGRESS NOTE  Date of patient's visit: 8/11/2020  Patient's Name:  Pamela Quiroz YOB: 1945            Patient Care Team:  Doretha Moise MD as PCP - Edith Bangura MD as PCP - [facility] Provider  Luis Perales DPM as Physician (Podiatry)      Chief Complaint   Patient presents with    Foot Pain    Nail Problem       Subjective: This Dolph Ohms comes to clinic for foot and nail care. Pt currently has complaint of thickened, painful, elongated nails that he/she cannot manage by themselves. Pt. Relates pain to nails with shoe gear. Pt's primary care physician is Doretha Moise MD last seen 8/6/20. Pt has a new complaint of  crossoing over 2nd toe due to the bunion.   States that the toes are close together and cause pain    Past Medical History:   Diagnosis Date    Anxiety     Arthritis     Cataracts, bilateral     Fracture (healed) treatment follow-up 2015    hand fx post fall    Mental retardation     Movement disorder     Primary osteoarthritis involving multiple joints 2/17/2017    Rheumatoid arthritis ([facility] [address]     Seizures (HCC)        No Known Allergies  Current Outpatient Medications on File Prior to Visit   Medication Sig Dispense Refill    [START ON 8/15/2020] divalproex (DEPAKOTE) 500 MG DR tablet TAKE 2 TABLETS BY MOUTH TWICE A  tablet 0    [START ON 8/15/2020] divalproex (DEPAKOTE) 250 MG DR tablet TAKE 1 TABLET BY MOUTH TWICE A DAY 56 tablet 0    carBAMazepine (CARBATROL) 200 MG extended release capsule TAKE 4 CAPSULES BY MOUTH TWICE A  capsule 3    naproxen (NAPROSYN) 500 MG tablet TAKE 1 TABLET BY MOUTH TWICE A DAY 60 tablet 5    bumetanide (BUMEX) 1 MG tablet TAKE 1 TABLET BY MOUTH DAILY 30 tablet 5    docusate (COLACE, DULCOLAX) 100 MG CAPS Take 100 mg by mouth 2 times daily as needed (constipation) 60 capsule 5    divalproex (DEPAKOTE ER) 250 MG extended release tablet Take 250 mg by mouth 2 times daily      alendronate (FOSAMAX) 70 MG tablet TAKE 1 TABLET EVERY WEEK 4 tablet 5    CARBATROL 200 MG extended release capsule TAKE 4 CAPSULES TWICE A  capsule 0     No current facility-administered medications on file prior to visit. Review of Systems. Review of Systems:   History obtained from chart review and the patient  General ROS: negative for - chills, fatigue, fever, night sweats or weight gain  Constitutional: Negative for chills, diaphoresis, fatigue, fever and unexpected weight change. Musculoskeletal: Positive for arthralgias, gait problem and joint swelling. Neurological ROS: negative for - behavioral changes, confusion, headaches or seizures. Negative for weakness and numbness. Dermatological ROS: negative for - mole changes, rash  Cardiovascular: Negative for leg swelling. Gastrointestinal: Negative for constipation, diarrhea, nausea and vomiting. Objective:  Dermatologic Exam:  Skin lesion/ulceration Absent . Skin No rashes or nodules noted. .   Skin is thin, with flaky sloughing skin as well as decreased hair growth to the lower leg  Small red hemosiderin deposits seen dorsal foot   Musculoskeletal:       Large bunion deformity to josé manuel feet with crossover 2nd toe      1st MPJ ROM decreased, Bilateral.  Muscle strength 5/5, Bilateral.  Pain present upon palpation of toenails 1-5, Bilateral. decreased medial longitudinal arch, Bilateral.  Ankle ROM decreased,Bilateral.    Dorsally contracted digits present digits 2, Bilateral.     Vascular: DP pulses 1/4 bilateral.  PT pulses 0/4 bilateral.   CFT <5 seconds, Bilateral.  Hair growth absent to the level of the digits, Bilateral.  Edema present, Bilateral.  Varicosities absent, Bilateral. Erythema absent, Bilateral    Neurological: Sensation diminshed to light touch to level of digits, Bilateral. Protective sensation intact 6/10 sites via 5.07/10g [place] Monofilament, Bilateral.  negative Tinel's, Bilateral.  negative Valleix sign, Bilateral.      Integument: Warm, dry, supple, Bilateral.  Open lesion absent, Bilateral.  Interdigital maceration absent to web spaces 4, Bilateral.  Nails 1-5 left and 1-5 right thickened > 3.0 mm, dystrophic and crumbly, discolored with subungual debris. Fissures absent, Bilateral.   General: AAO x 3 in NAD. Derm  Toenail Description  Sites of Onychomycosis Involvement (Check affected area)  [x] [x] [x] [x] [x] [x] [x] [x] [x] [x]  5 4 3 2 1 1 2 3 4 5                          Right                                        Left    Thickness  [x] [x] [x] [x] [x] [x] [x] [x] [x] [x]  5 4 3 2 1 1 2 3 4 5                         Right                                        Left    Dystrophic Changes   [x] [x] [x] [x] [x] [x] [x] [x] [x] [x]  5 4 3 2 1 1 2 3 4 5                         Right                                        Left    Color  [x] [x] [x] [x] [x] [x] [x] [x] [x] [x]  5 4 3 2 1 1 2 3 4 5                          Right                                        Left    Incurvation/Ingrowin   [] [] [] [] [] [] [] [] [] []  5 4 3 2 1 1 2 3 4 5                         Right                                        Left    Inflammation/Pain   [x] [x] [x] [x] [x] [x] [x] [x] [x] [x]  5 4 3  2 1 1 2 3 4 5                         Right                                        Left       Nails are painful 1-10 with direct palpation. Q7   []Yes  []No                Q8   [x]Yes  []No                     Q9   []Yes    []No  Assessment:  77 y.o. female with:   No diagnosis found. No orders of the defined types were placed in this encounter. Plan:   Pt was evaluated and examined. Patient was given personalized discharge instructions. For the bunion pt is to use the pad I dispensed to him to help hold the toes apart.   .  Pt is to wear deep toe boxed shoes and to stretch the flexor muscles of the toes. This was demonstrated to the patient. Nails 1-10 were debrided in length and thickness sharply with a nail nipper and  without incident. Pt will follow up in 9 weeks or sooner if any problems arise. Diagnosis was discussed with the pt and all of their questions were answered in detail. Proper foot hygiene and care was discussed with the pt. Patient to check feet daily and contact the office with any questions/problems/concerns. Other comorbidity noted and will be managed by PCP. Pain waiver discussed with patient and confirmed.    8/11/2020      Electronically signed by Dontrell Ortiz DPM on 8/11/2020 at 9:14 AM  8/11/2020

## 2020-08-19 ENCOUNTER — TELEPHONE (OUTPATIENT)
Dept: ADMINISTRATIVE | Age: 75
End: 2020-08-19

## 2020-08-19 NOTE — TELEPHONE ENCOUNTER
Pt is disabled and Caregiver would like to know if she should drop her disablity form off or does he want an appointment with them?     Please call [facility] - [phone]

## 2020-08-21 ENCOUNTER — TELEPHONE (OUTPATIENT)
Dept: FAMILY MEDICINE CLINIC | Age: 75
End: 2020-08-21

## 2020-08-21 NOTE — TELEPHONE ENCOUNTER
Appears she was diagnosed with osteoporosis in 2015 and alendronate was started then but never refilled so has not been on for couple years. Update DEXA and will then advise on treatment options available. Does Anay recall any adverse effects Lilo Olson may have had in 2018?

## 2020-08-21 NOTE — TELEPHONE ENCOUNTER
Patient caregiver, Kieran Wilson is calling for clarification as to weather or not Diana Gao is to be taking Fosamax or not

## 2020-08-31 ENCOUNTER — OFFICE VISIT (OUTPATIENT)
Dept: FAMILY MEDICINE CLINIC | Age: 75
End: 2020-08-31
Payer: MEDICAID

## 2020-08-31 VITALS
OXYGEN SATURATION: 97 % | DIASTOLIC BLOOD PRESSURE: 72 MMHG | TEMPERATURE: 97.3 F | SYSTOLIC BLOOD PRESSURE: 140 MMHG | HEART RATE: 69 BPM

## 2020-08-31 PROCEDURE — 99213 OFFICE O/P EST LOW 20 MIN: CPT | Performed by: FAMILY MEDICINE

## 2020-08-31 RX ORDER — CARBAMAZEPINE 200 MG/1
CAPSULE, EXTENDED RELEASE ORAL
Qty: 720 CAPSULE | Refills: 3 | Status: ON HOLD | OUTPATIENT
Start: 2020-08-31 | End: 2021-08-01 | Stop reason: HOSPADM

## 2020-08-31 RX ORDER — ALENDRONATE SODIUM 70 MG/1
70 TABLET ORAL
Qty: 4 TABLET | Refills: 11 | Status: SHIPPED | OUTPATIENT
Start: 2020-08-31 | End: 2021-08-04 | Stop reason: SDUPTHER

## 2020-08-31 ASSESSMENT — ENCOUNTER SYMPTOMS
SINUS PRESSURE: 0
BACK PAIN: 0
SHORTNESS OF BREATH: 0
DIARRHEA: 0
SORE THROAT: 0
COUGH: 0
VOMITING: 0
NAUSEA: 0

## 2020-08-31 NOTE — PROGRESS NOTES
Cedric Gipson is a 77 y.o. female who presents todayfor her medical conditions/complaints as noted below. Cedric Gipson is here today c/oDiscuss Medications (sometimes lethargic with evening meds)      : HPI    Routine having trouble with second dose of tegretol causing sedation when given at dinner time. Past Medical History:   Diagnosis Date    Anxiety     Arthritis     Cataracts, bilateral     Fracture (healed) treatment follow-up 2015    hand fx post fall    Mental retardation     Movement disorder     Primary osteoarthritis involving multiple joints 2/17/2017    [facility] [address]     Seizures ([facility] [address]       Past Surgical History:   Procedure Laterality Date    HIP FRACTURE SURGERY Right 11/20/14    LEG SURGERY Right 07/18/2018    RIGHT TIBIA IM MARÍA -KATE KNEE TRIANGLES, INTRAMEDULLARY REAMERS, SYNTHES TIBIAL NAIL    NC TREAT TIBIAL SHAFT FX, INTRAMED IMPLANT Right 7/18/2018    RIGHT TIBIA IM MARÍA -KATE KNEE TRIANGLES, INTRAMEDULLARY REAMERS, SYNTHES TIBIAL NAIL performed by Maral Hackett DO at [address] [place] [address] History   Family history unknown: Yes     Social History     Tobacco Use    Smoking status: Never Smoker    Smokeless tobacco: Never Used   Substance Use Topics    Alcohol use: No     Alcohol/week: 0.0 standard drinks      Current Outpatient Medications   Medication Sig Dispense Refill    carBAMazepine (CARBATROL) 200 MG extended release capsule TAKE 4 CAPSULES BY MOUTH TWICE A DAY. Give second dose at bedtime.  720 capsule 3    alendronate (FOSAMAX) 70 MG tablet Take 1 tablet by mouth every 7 days 4 tablet 11    divalproex (DEPAKOTE) 500 MG DR tablet TAKE 2 TABLETS BY MOUTH TWICE A  tablet 0    naproxen (NAPROSYN) 500 MG tablet TAKE 1 TABLET BY MOUTH TWICE A DAY 60 tablet 5    bumetanide (BUMEX) 1 MG tablet TAKE 1 TABLET BY MOUTH DAILY 30 tablet 5    docusate (COLACE, DULCOLAX) 100 MG CAPS Take 100 mg by mouth 2 times daily as needed (constipation) 60 capsule 5    divalproex (DEPAKOTE ER) 250 MG extended release tablet Take 250 mg by mouth 2 times daily      CARBATROL 200 MG extended release capsule TAKE 4 CAPSULES TWICE A  capsule 0    divalproex (DEPAKOTE) 250 MG DR tablet TAKE 1 TABLET BY MOUTH TWICE A DAY 56 tablet 0     No current facility-administered medications for this visit. No Known Allergies      Subjective:   Review of Systems   Constitutional: Negative for chills, diaphoresis and fever. HENT: Negative for congestion, sinus pressure and sore throat. Eyes: Negative for visual disturbance. Respiratory: Negative for cough and shortness of breath. Cardiovascular: Negative for chest pain and leg swelling. Gastrointestinal: Negative for diarrhea, nausea and vomiting. Genitourinary: Negative for dysuria, menstrual problem, urgency and vaginal discharge. Musculoskeletal: Positive for arthralgias and gait problem. Negative for back pain and myalgias. Skin: Negative for rash. Neurological: Negative for weakness, numbness and headaches. Psychiatric/Behavioral: Positive for decreased concentration and sleep disturbance.       :   BP (!) 140/72   Pulse 69   Temp 97.3 °F (36.3 °C) (Temporal)   SpO2 97%     Physical Exam  Constitutional:       General: She is not in acute distress. Appearance: She is well-developed. She is not diaphoretic. HENT:      Head: Normocephalic and atraumatic. Mouth/Throat:      Pharynx: No oropharyngeal exudate. Eyes:      General: No scleral icterus. Neck:      Musculoskeletal: Neck supple. Vascular: No carotid bruit. Cardiovascular:      Heart sounds: No murmur. No friction rub. No gallop. Pulmonary:      Effort: No respiratory distress. Breath sounds: No wheezing or rales. Chest:      Chest wall: No tenderness. Abdominal:      Tenderness: There is no abdominal tenderness. Lymphadenopathy:      Cervical: No cervical adenopathy. Skin:     Findings: No rash.

## 2020-09-03 RX ORDER — BUMETANIDE 1 MG/1
TABLET ORAL
Qty: 28 TABLET | Refills: 3 | Status: SHIPPED | OUTPATIENT
Start: 2020-09-03 | End: 2021-02-01 | Stop reason: SDUPTHER

## 2020-09-03 RX ORDER — DOCUSATE SODIUM 100 MG
CAPSULE ORAL
Qty: 28 CAPSULE | Refills: 3 | Status: SHIPPED | OUTPATIENT
Start: 2020-09-03

## 2020-09-03 NOTE — TELEPHONE ENCOUNTER
Tremayne Redding is calling to request a refill on the following medication(s):    Medication Request:  Requested Prescriptions     Pending Prescriptions Disp Refills    STOOL SOFTENER 100 MG capsule [Pharmacy Med Name: STOOL SOFTNR 100MG CAP] 28 capsule      Sig: TAKE 1 CAPSULE BY MOUTH TWICE A DAY AS NEEDED FOR CONSTIPATION    bumetanide (BUMEX) 1 MG tablet [Pharmacy Med Name: BUMETANIDE 1MG TAB] 28 tablet      Sig: TAKE 1 TABLET BY MOUTH DAILY       Last Visit Date (If Applicable):  6/98/5378    Next Visit Date:    Visit date not found

## 2020-09-03 NOTE — TELEPHONE ENCOUNTER
Pharmacy requesting refill of Depakote 500 mg & Depakote 250 mg.      Medication active on med list yes      Date last ordered: 8/15/2020  verified on 9/3/2020  verified by STEPHANY Garrido LPN      Date of last appointment 5/7/2019    Next Visit Date:  9/9/2020

## 2020-09-04 RX ORDER — DIVALPROEX SODIUM 250 MG/1
TABLET, DELAYED RELEASE ORAL
Qty: 56 TABLET | Refills: 0 | Status: SHIPPED | OUTPATIENT
Start: 2020-09-04 | End: 2021-07-09

## 2020-09-04 RX ORDER — DIVALPROEX SODIUM 500 MG/1
TABLET, DELAYED RELEASE ORAL
Qty: 112 TABLET | Refills: 0 | Status: SHIPPED | OUTPATIENT
Start: 2020-09-04 | End: 2021-07-09

## 2020-09-08 ENCOUNTER — TELEPHONE (OUTPATIENT)
Dept: FAMILY MEDICINE CLINIC | Age: 75
End: 2020-09-08

## 2020-09-08 NOTE — TELEPHONE ENCOUNTER
Spoke to patient's caregiver. Patient will have enough with 28 prn for a month.    Spoke to pharmacy with update

## 2020-09-08 NOTE — TELEPHONE ENCOUNTER
I do not know how much she needs. If she takes bid prn then perhaps this is enough, however if she takes it bid every day then 60 would be more appropriate number. Perhaps group home could be contacted for information on exactly how she takes them.

## 2020-09-09 ENCOUNTER — OFFICE VISIT (OUTPATIENT)
Dept: NEUROLOGY | Age: 75
End: 2020-09-09
Payer: MEDICAID

## 2020-09-09 VITALS
DIASTOLIC BLOOD PRESSURE: 66 MMHG | SYSTOLIC BLOOD PRESSURE: 136 MMHG | WEIGHT: 135 LBS | BODY MASS INDEX: 26.5 KG/M2 | HEIGHT: 60 IN | TEMPERATURE: 97.2 F | HEART RATE: 80 BPM

## 2020-09-09 PROCEDURE — 99214 OFFICE O/P EST MOD 30 MIN: CPT | Performed by: PSYCHIATRY & NEUROLOGY

## 2020-09-09 RX ORDER — DIVALPROEX SODIUM 500 MG/1
TABLET, DELAYED RELEASE ORAL
Qty: 120 TABLET | Refills: 11 | Status: SHIPPED | OUTPATIENT
Start: 2020-09-09 | End: 2021-07-28

## 2020-09-09 RX ORDER — DIVALPROEX SODIUM 250 MG/1
TABLET, DELAYED RELEASE ORAL
Qty: 60 TABLET | Refills: 11 | Status: SHIPPED | OUTPATIENT
Start: 2020-09-09 | End: 2021-07-09

## 2020-09-09 RX ORDER — CARBAMAZEPINE 200 MG/1
CAPSULE, EXTENDED RELEASE ORAL
Qty: 240 CAPSULE | Refills: 11 | Status: SHIPPED | OUTPATIENT
Start: 2020-09-09 | End: 2021-07-09

## 2020-09-09 ASSESSMENT — ENCOUNTER SYMPTOMS
GASTROINTESTINAL NEGATIVE: 1
EYES NEGATIVE: 1
RESPIRATORY NEGATIVE: 1
ALLERGIC/IMMUNOLOGIC NEGATIVE: 1

## 2020-09-09 NOTE — PROGRESS NOTES
of children: None    Years of education: None    Highest education level: None   Occupational History    None   Social Needs    Financial resource strain: None    Food insecurity     Worry: None     Inability: None    Transportation needs     Medical: None     Non-medical: None   Tobacco Use    Smoking status: Never Smoker    Smokeless tobacco: Never Used   Substance and Sexual Activity    Alcohol use: No     Alcohol/week: 0.0 standard drinks    Drug use: No    Sexual activity: None   Lifestyle    Physical activity     Days per week: None     Minutes per session: None    Stress: None   Relationships    Social connections     Talks on phone: None     Gets together: None     Attends [employer] service: None     Active member of club or organization: None     Attends meetings of clubs or organizations: None     Relationship status: None    Intimate partner violence     Fear of current or ex partner: None     Emotionally abused: None     Physically abused: None     Forced sexual activity: None   Other Topics Concern    None   Social History Narrative    None       Current Outpatient Medications   Medication Sig Dispense Refill    divalproex (DEPAKOTE) 250 MG DR tablet Take 1 po bid . Total 1250 mg po bid 60 tablet 11    carBAMazepine (CARBATROL) 200 MG extended release capsule Take 4 po bid 240 capsule 11    divalproex (DEPAKOTE) 500 MG DR tablet Take 2 po bid. Total 1250 mg po bid 120 tablet 11    divalproex (DEPAKOTE) 500 MG DR tablet TAKE 2 TABLETS BY MOUTH TWICE A  tablet 0    divalproex (DEPAKOTE) 250 MG DR tablet TAKE 1 TABLET BY MOUTH TWICE A DAY 56 tablet 0    STOOL SOFTENER 100 MG capsule TAKE 1 CAPSULE BY MOUTH TWICE A DAY AS NEEDED FOR CONSTIPATION 28 capsule 3    bumetanide (BUMEX) 1 MG tablet TAKE 1 TABLET BY MOUTH DAILY 28 tablet 3    carBAMazepine (CARBATROL) 200 MG extended release capsule TAKE 4 CAPSULES BY MOUTH TWICE A DAY. Give second dose at bedtime.  720 capsule 3    alendronate (FOSAMAX) 70 MG tablet Take 1 tablet by mouth every 7 days 4 tablet 11    naproxen (NAPROSYN) 500 MG tablet TAKE 1 TABLET BY MOUTH TWICE A DAY 60 tablet 5    CARBATROL 200 MG extended release capsule TAKE 4 CAPSULES TWICE A  capsule 0     No current facility-administered medications for this visit. No Known Allergies    Review of Systems   Constitutional: Negative. HENT: Negative. Eyes: Negative. Respiratory: Negative. Cardiovascular: Positive for leg swelling. Gastrointestinal: Negative. Endocrine: Negative. Genitourinary: Negative. Musculoskeletal: Positive for gait problem and myalgias. Skin: Negative. Allergic/Immunologic: Negative. Psychiatric/Behavioral: Negative. Objective:   Physical Exam  Vitals:    09/09/20 0921   BP: 136/66   Pulse: 80   Temp:           Neurological Examination in wheelchair  Constitutional .General exam well groomed   Head/Ears /Nose/Throat: external ear . Normal exam  Neck and thyroid . Normal size. No bruits  Respiratory . Breathsounds clear bilaterally  Cardiovascular: Auscultation of heart with regular rate and rhythm  Musculoskeletal. Muscle bulk and tone normal                                                           Muscle strength legs 4-/5 bilaterally . Upper extremity 5/5 strength                                                                              No dysmetria or dysdiadokinesis  No tremor   Normal fine motor  Orientation Alert and oriented x 0  Attention and concentration reduced  Short term memory 0 words out of 3 in one minute   Language process normal . Dysarthria  Cranial nerve 2 normal acuety and visual fields  Cranial nerve 3, 4 and 6 . Extraocular muscles are intact . Pupils are equal and reactive   Cranial nerve 5 . Intact corneal reflex. Normal facial sensation  Cranial nerve 7 normal exam   Cranial nerve 8. Grossly intact hearing   Cranial nerve 9 and 10.  Symmetric palate elevation   Cranial nerve 11 , 5 out of 5 strength   Cranial Nerve 12 midline tongue . No atrophy  Sensation . Normal proprioception . Intact Vibration . Normal pinprick and light touch   Deep Tendon Reflexes brisk reflexes  Plantar response equivocal bilaterally    Assessment:       Diagnosis Orders   1. Seizure disorder  ALT    AST    Carbamazepine Level, Total    Valproic acid level, total    CBC With Auto Differential   2. Developmental delay     She is to continue current medication regimen to undergo drug levels       Plan:      Orders Placed This Encounter   Procedures    ALT     Standing Status:   Future     Standing Expiration Date:   9/9/2021    AST     Standing Status:   Future     Standing Expiration Date:   9/9/2021    Carbamazepine Level, Total     Standing Status:   Future     Standing Expiration Date:   9/9/2021     Order Specific Question:   Dose Schedule & Time of Last Dose? Answer:   night    Valproic acid level, total     Standing Status:   Future     Standing Expiration Date:   9/9/2021     Order Specific Question:   Dose Schedule & Time of Last Dose?      Answer:   night    CBC With Auto Differential     Standing Status:   Future     Standing Expiration Date:   9/9/2021

## 2020-09-23 ENCOUNTER — APPOINTMENT (OUTPATIENT)
Dept: CT IMAGING | Age: 75
End: 2020-09-23
Payer: MEDICAID

## 2020-09-23 ENCOUNTER — APPOINTMENT (OUTPATIENT)
Dept: GENERAL RADIOLOGY | Age: 75
End: 2020-09-23
Payer: MEDICAID

## 2020-09-23 ENCOUNTER — HOSPITAL ENCOUNTER (EMERGENCY)
Age: 75
Discharge: HOME | End: 2020-09-23
Attending: EMERGENCY MEDICINE
Payer: MEDICAID

## 2020-09-23 VITALS
DIASTOLIC BLOOD PRESSURE: 84 MMHG | HEIGHT: 63 IN | TEMPERATURE: 98.2 F | WEIGHT: 140 LBS | SYSTOLIC BLOOD PRESSURE: 155 MMHG | BODY MASS INDEX: 24.8 KG/M2 | HEART RATE: 80 BPM | RESPIRATION RATE: 14 BRPM | OXYGEN SATURATION: 97 %

## 2020-09-23 PROCEDURE — 99282 EMERGENCY DEPT VISIT SF MDM: CPT

## 2020-09-23 PROCEDURE — 73562 X-RAY EXAM OF KNEE 3: CPT

## 2020-09-23 PROCEDURE — 73700 CT LOWER EXTREMITY W/O DYE: CPT

## 2020-09-23 ASSESSMENT — PAIN SCALES - GENERAL: PAINLEVEL_OUTOF10: 5

## 2020-09-23 NOTE — ED PROVIDER NOTES
alendronate (FOSAMAX) 70 MG tablet Take 1 tablet by mouth every 7 days, Disp-4 tablet,R-11Normal      naproxen (NAPROSYN) 500 MG tablet TAKE 1 TABLET BY MOUTH TWICE A DAY, Disp-60 tablet, R-5Maximum Refills ReachedNormal      !! CARBATROL 200 MG extended release capsule TAKE 4 CAPSULES TWICE A DAY, Disp-672 capsule, R-0, DAWNormal       !! - Potential duplicate medications found. Please discuss with provider. PAST MEDICAL HISTORY         Diagnosis Date    Anxiety     Arthritis     Cataracts, bilateral     Fracture (healed) treatment follow-up 2015    hand fx post fall    Mental retardation     Movement disorder     Primary osteoarthritis involving multiple joints 2/17/2017    Rhe[facility] arthritis ([facility] [address]     Seizures ([facility] [address]        SURGICAL HISTORY           Procedure Laterality Date    HIP FRACTURE SURGERY Right 11/20/14    LEG SURGERY Right 07/18/2018    RIGHT TIBIA IM MARÍA -KATE KNEE TRIANGLES, INTRAMEDULLARY REAMERS, SYNTHES TIBIAL NAIL    DE TREAT TIBIAL SHAFT FX, INTRAMED IMPLANT Right 7/18/2018    RIGHT TIBIA IM MARÍA -KATE KNEE TRIANGLES, INTRAMEDULLARY REAMERS, SYNTHES TIBIAL NAIL performed by Brayan Olivia DO at [place] [address]           Family history unknown: Yes     No family status information on file. SOCIAL HISTORY      reports that she has never smoked. She has never used smokeless tobacco. She reports that she does not drink alcohol or use drugs. REVIEW OF SYSTEMS    (2-9 systems for level 4, 10 or more for level 5)     Review of Systems   Unable to perform ROS: Psychiatric disorder        Except as noted above the remainder of the review of systems was reviewed and negative.      PHYSICAL EXAM    (up to 7 for level 4, 8 or more for level 5)     Vitals:    09/23/20 1415 09/23/20 1419   BP:  (!) 155/84   Pulse:  80   Resp:  14   Temp:  98.2 °F (36.8 °C)   SpO2:  97%   Weight: 140 lb (63.5 kg)    Height: 5' 3\" (1.6 m)        Physical Exam  Vitals signs reviewed. Constitutional:       General: She is not in acute distress. Appearance: She is well-developed. She is not diaphoretic. HENT:      Head: Normocephalic and atraumatic. Eyes:      General: No scleral icterus. Right eye: No discharge. Left eye: No discharge. Neck:      Musculoskeletal: Neck supple. Cardiovascular:      Rate and Rhythm: Normal rate and regular rhythm. Pulmonary:      Effort: Pulmonary effort is normal. No respiratory distress. Breath sounds: Normal breath sounds. No stridor. No wheezing or rales. Abdominal:      General: There is no distension. Palpations: Abdomen is soft. Tenderness: There is no abdominal tenderness. Musculoskeletal:      Comments: Right hip and femur area nontender. The right knee seems to be tender laterally but there is no obvious deformity or bruising. The lower leg is not tender in the ankle is not tender. Lymphadenopathy:      Cervical: No cervical adenopathy. Skin:     General: Skin is warm and dry. Findings: No erythema or rash. Neurological:      Mental Status: She is alert.       Comments: Awake and alert   Psychiatric:         Behavior: Behavior normal.             DIAGNOSTIC RESULTS     EKG: All EKG's are interpreted by the Emergency Department Physician who either signs or Co-signs this chart in the absence of a cardiologist.    RADIOLOGY:   Non-plain film images such as CT, Ultrasound and MRI are read by the radiologist. Hemalatha Madeline radiographic images are visualized and preliminarily interpreted by the emergency physician with the below findings:    Interpretation per the Radiologist below, if available at the time of this note:    Xr Knee Right (3 Views)    Result Date: 9/23/2020  EXAMINATION: THREE XRAY VIEWS OF THE RIGHT KNEE 9/23/2020 2:53 pm COMPARISON: 07/18/2018 HISTORY: ORDERING SYSTEM PROVIDED HISTORY: Pain TECHNOLOGIST PROVIDED HISTORY: Pain Reason for Exam: Pt c/o pain to right knee, s/p fall x 1 day ago. Acuity: Acute Type of Exam: Subsequent/Follow-up FINDINGS: No evidence of acute fracture or dislocation. No focal osseous lesion. Possible lipohemarthrosis. No focal soft tissue abnormality. Marked osteopenia. Partially imaged ORIF of the tibia. Possible lipohemarthrosis. Given the marked osteopenia, nondisplaced fracture is difficult to exclude. Consider further characterization with cross-sectional imaging. Ct Knee Right Wo Contrast    Result Date: 9/23/2020  EXAMINATION: CT OF THE RIGHT KNEE WITHOUT CONTRAST 9/23/2020 3:49 pm TECHNIQUE: CT of the right knee was performed without the administration of intravenous contrast.  Multiplanar reformatted images are provided for review. Dose modulation, iterative reconstruction, and/or weight based adjustment of the mA/kV was utilized to reduce the radiation dose to as low as reasonably achievable. COMPARISON: Right knee radiograph obtained on the same day. Right tibia/fibula radiograph dated 07/18/2018. HISTORY ORDERING SYSTEM PROVIDED HISTORY: fall TECHNOLOGIST PROVIDED HISTORY: fall Reason for Exam: Right knee injury, fall, pt MRDD, best images possible Acuity: Acute Type of Exam: Initial FINDINGS: Bones: The bones appear demineralized. There is subtle cortical irregularity and slight depression of the central weight-bearing portion of the lateral tibial plateau (series 2 image 45) likely reflecting minimally displaced/depressed lateral tibial plateau fracture. There is approximately 3 mm depression of the articular surface. There is a marginal osteophyte formation seen along the outer weight-bearing portion of the medial tibial plateau; however, the medial tibial plateau appears intact. Again seen is an intramedullary ankit and multiple screws affixing the right tibia as seen on the prior study.   The proximal portion of the intramedullary ankit is seen along the anteromedial cortex of the proximal tibial diaphysis; however, these appear to be grossly similar to the prior tibia fibular radiograph dated 07/18/2018 accounting for differences in technique. Soft Tissue: There is diffuse subcutaneous edema and increased reticulation without drainable fluid collection seen. There is moderate diffuse atrophy of the musculature of the knee. Again seen are foci of heterotrophic ossification seen within the posteromedial soft tissues of the calf. Joint:  Mild to moderate osteoarthritis affects the right knee with tricompartmental osteophytes. As seen on the radiograph obtained the same day, there is a moderate-sized lipohemarthrosis, likely due to underlying lateral tibial plateau fracture. Subtle cortical irregularity and 3 mm depression of the central weight-bearing portion of the lateral tibial plateau, reflecting minimally displaced/depressed lateral tibial plateau fracture. The medial tibial plateau appears grossly intact. Changes of prior orthopedic fixation of the right tibia partially visualized. The proximal aspect of the intramedullary ankit is seen along the cortex of the anterior tibia; however, similar findings were seen on the tibia fibula radiograph dated 07/18/2018 and of unknown clinical significance. The bones appear demineralized. LABS:  Labs Reviewed - No data to display    All other labs were within normal range or not returned as of this dictation. EMERGENCY DEPARTMENT COURSE and DIFFERENTIAL DIAGNOSIS/MDM:   Vitals:    Vitals:    09/23/20 1415 09/23/20 1419   BP:  (!) 155/84   Pulse:  80   Resp:  14   Temp:  98.2 °F (36.8 °C)   SpO2:  97%   Weight: 140 lb (63.5 kg)    Height: 5' 3\" (1.6 m)        No orders of the defined types were placed in this encounter. Medical Decision Making: Tibial plateau fracture identified on CAT scan. Knee immobilizer applied and application was checked by me and found to be appropriate, she is neurovascularly intact.   Findings are discussed with the caregiver and she will follow-up with orthopedics. CONSULTS:  None    PROCEDURES:  None    FINAL IMPRESSION      1.  Closed fracture of right tibial plateau, initial encounter          DISPOSITION/PLAN   DISPOSITION Decision To Discharge 09/23/2020 04:49:57 PM      PATIENT REFERRED TO:   Katrina Maravilla MD  [address] [place], [place] [address]  [phone]      As needed    [facility] ED  [address] [facility]  [phone]    If symptoms worsen    Mallory Ayers MD  . [facility] [address] [facility]  [phone]            DISCHARGE MEDICATIONS:     Discharge Medication List as of 9/23/2020  4:50 PM            (Please note that portions of this note were completed with a voice recognition program.  Efforts were made to edit the dictations but occasionally words are mis-transcribed.)    Corbin Pressley MD  Attending Emergency Physician            Corbin Pressley MD  09/23/20 165       Corbin Pressley MD  10/17/20 9969

## 2020-09-23 NOTE — ED NOTES
Pt arrived with c/o right knee pain after a fall yesterday. No selling or deformities present.       Eleanor Baumgarten, RN  09/23/20 9644

## 2020-09-25 ENCOUNTER — TELEPHONE (OUTPATIENT)
Dept: FAMILY MEDICINE CLINIC | Age: 75
End: 2020-09-25

## 2020-09-25 NOTE — TELEPHONE ENCOUNTER
[facility] ([facility]) ED Follow up Call    Reason for ED visit:  Fall    9/25/2020         FU appts/Provider:    Future Appointments   Date Time Provider Jv Marrufo   10/13/2020 10:00 AM Tri Curry Podiatry [facility]   3/1/2021  9:20 AM DO RAO Montelongo [facility]   9/13/2021  9:20 AM Mathew Pace MD Neuro Spec [facility]         VOICEMAIL DOCUMENTATION - ERASE IF NOT USED  Hi, this message is for [facility]. This is Josefina Sesay from [employer] office. Just calling to see how you are doing after your recent visit to the Emergency Room. [employer] wants to make sure you were able to fill any prescriptions and that you understand your discharge instructions. Please return our call if you need to make a follow up appointment with your provider or have any further needs. Our phone number is [phone]. Have a great day.

## 2020-10-01 RX ORDER — NAPROXEN 500 MG/1
TABLET ORAL
Qty: 56 TABLET | Refills: 0 | Status: SHIPPED | OUTPATIENT
Start: 2020-10-01 | End: 2020-10-29

## 2020-10-01 NOTE — TELEPHONE ENCOUNTER
Rasheeda Cowart is calling to request a refill on the following medication(s):    Medication Request:  Requested Prescriptions     Pending Prescriptions Disp Refills    naproxen (NAPROSYN) 500 MG tablet [Pharmacy Med Name: NAPROXEN 500MG TAB] 56 tablet 0     Sig: TAKE 1 TABLET BY MOUTH TWICE A DAY       Last Visit Date (If Applicable):  2/38/6366    Next Visit Date:    Visit date not found

## 2020-10-29 RX ORDER — NAPROXEN 500 MG/1
TABLET ORAL
Qty: 56 TABLET | Refills: 0 | Status: SHIPPED | OUTPATIENT
Start: 2020-10-29 | End: 2020-11-27

## 2020-10-29 NOTE — TELEPHONE ENCOUNTER
Mia Brown is calling to request a refill on the following medication(s):    Medication Request:  Requested Prescriptions     Pending Prescriptions Disp Refills    naproxen (NAPROSYN) 500 MG tablet [Pharmacy Med Name: NAPROXEN 500MG TAB] 56 tablet 0     Sig: TAKE 1 TABLET BY MOUTH TWICE A DAY       Last Visit Date (If Applicable):  4/84/9858    Next Visit Date:    Visit date not found

## 2020-11-19 ENCOUNTER — OFFICE VISIT (OUTPATIENT)
Dept: PODIATRY | Age: 75
End: 2020-11-19
Payer: MEDICAID

## 2020-11-19 VITALS — HEIGHT: 63 IN | TEMPERATURE: 97 F | RESPIRATION RATE: 16 BRPM | WEIGHT: 140 LBS | BODY MASS INDEX: 24.8 KG/M2

## 2020-11-19 PROCEDURE — 99999 PR OFFICE/OUTPT VISIT,PROCEDURE ONLY: CPT | Performed by: PODIATRIST

## 2020-11-19 PROCEDURE — 11721 DEBRIDE NAIL 6 OR MORE: CPT | Performed by: PODIATRIST

## 2020-11-19 NOTE — PROGRESS NOTES
[facility] PHYSICIANS  [facility]  [address] [place] [address]  Dept: [phone]  Dept Fax: [phone]     PAIN PROGRESS NOTE  Date of patient's visit: 11/19/2020  Patient's Name:  Maria Isabel Mendez YOB: 1945            Patient Care Team:  Konrad Hayes MD as PCP - Kathia Chase MD as PCP - [facility] , DPZORA as Physician (Podiatry)      Chief Complaint   Patient presents with    Foot Pain    Nail Problem       Subjective: This Maria Isabel Mendez comes to clinic for foot and nail care. Pt currently has complaint of thickened, painful, elongated nails that he/she cannot manage by themselves. Pt. Relates pain to nails with shoe gear. Pt's primary care physician is Konrad Hayes MD last seen 08/31/2020. Pt has a new complaint of NONE. Past Medical History:   Diagnosis Date    Anxiety     Arthritis     Cataracts, bilateral     Fracture (healed) treatment follow-up 2015    hand fx post fall    Mental retardation     Movement disorder     Primary osteoarthritis involving multiple joints 2/17/2017    Rheumatoid arthritis ([address]     Seizures (HCC)        No Known Allergies  Current Outpatient Medications on File Prior to Visit   Medication Sig Dispense Refill    naproxen (NAPROSYN) 500 MG tablet TAKE 1 TABLET BY MOUTH TWICE A DAY 56 tablet 0    divalproex (DEPAKOTE) 250 MG DR tablet Take 1 po bid . Total 1250 mg po bid 60 tablet 11    carBAMazepine (CARBATROL) 200 MG extended release capsule Take 4 po bid 240 capsule 11    divalproex (DEPAKOTE) 500 MG DR tablet Take 2 po bid.  Total 1250 mg po bid 120 tablet 11    divalproex (DEPAKOTE) 500 MG DR tablet TAKE 2 TABLETS BY MOUTH TWICE A  tablet 0    STOOL SOFTENER 100 MG capsule TAKE 1 CAPSULE BY MOUTH TWICE A DAY AS NEEDED FOR CONSTIPATION 28 capsule 3    bumetanide (BUMEX) 1 MG tablet TAKE 1 TABLET BY MOUTH DAILY 28 tablet 3    carBAMazepine (CARBATROL) 200 MG extended release capsule TAKE 4 CAPSULES BY MOUTH TWICE A DAY. Give second dose at bedtime. 720 capsule 3    alendronate (FOSAMAX) 70 MG tablet Take 1 tablet by mouth every 7 days 4 tablet 11    CARBATROL 200 MG extended release capsule TAKE 4 CAPSULES TWICE A  capsule 0    divalproex (DEPAKOTE) 250 MG DR tablet TAKE 1 TABLET BY MOUTH TWICE A DAY 56 tablet 0     No current facility-administered medications on file prior to visit. Review of Systems. Review of Systems:   History obtained from chart review and the patient  General ROS: negative for - chills, fatigue, fever, night sweats or weight gain  Constitutional: Negative for chills, diaphoresis, fatigue, fever and unexpected weight change. Musculoskeletal: Positive for arthralgias, gait problem and joint swelling. Neurological ROS: negative for - behavioral changes, confusion, headaches or seizures. Negative for weakness and numbness. Dermatological ROS: negative for - mole changes, rash  Cardiovascular: Negative for leg swelling. Gastrointestinal: Negative for constipation, diarrhea, nausea and vomiting. Objective:  Dermatologic Exam:  Skin lesion/ulceration Absent . Skin No rashes or nodules noted. .   Skin is thin, with flaky sloughing skin as well as decreased hair growth to the lower leg  Small red hemosiderin deposits seen dorsal foot   Musculoskeletal:     1st MPJ ROM decreased, Bilateral.  Muscle strength 5/5, Bilateral.  Pain present upon palpation of toenails 1-5, Bilateral. decreased medial longitudinal arch, Bilateral.  Ankle ROM decreased,Bilateral.    Dorsally contracted digits present digits 2, Bilateral.     Vascular: DP pulses 1/4 bilateral.  PT pulses 0/4 bilateral.   CFT <5 seconds, Bilateral.  Hair growth absent to the level of the digits, Bilateral.  Edema present, Bilateral.  Varicosities absent, Bilateral. Erythema absent, Bilateral    Neurological: Sensation diminshed to light touch to level of digits, Bilateral.  Protective sensation intact 6/10 sites via 5.07/10g [place]-Johanna Monofilament, Bilateral.  negative Tinel's, Bilateral.  negative Valleix sign, Bilateral.      Integument: Warm, dry, supple, Bilateral.  Open lesion absent, Bilateral.  Interdigital maceration absent to web spaces 4, Bilateral.  Nails 1-5 left and 1-5 right thickened > 3.0 mm, dystrophic and crumbly, discolored with subungual debris. Fissures absent, Bilateral.   General: AAO x 3 in NAD. Derm  Toenail Description  Sites of Onychomycosis Involvement (Check affected area)  [x] [x] [x] [x] [x] [x] [x] [x] [x] [x]  5 4 3 2 1 1 2 3 4 5                          Right                                        Left    Thickness  [x] [x] [x] [x] [x] [x] [x] [x] [x] [x]  5 4 3 2 1 1 2 3 4 5                         Right                                        Left    Dystrophic Changes   [x] [x] [x] [x] [x] [x] [x] [x] [x] [x]  5 4 3 2 1 1 2 3 4 5                         Right                                        Left    Color  [x] [x] [x] [x] [x] [x] [x] [x] [x] [x]  5 4 3 2 1 1 2 3 4 5                          Right                                        Left    Incurvation/Ingrowin   [] [] [] [] [] [] [] [] [] []  5 4 3 2 1 1 2 3 4 5                         Right                                        Left    Inflammation/Pain   [x] [x] [x] [x] [x] [x] [x] [x] [x] [x]  5 4 3  2 1 1 2 3 4 5                         Right                                        Left       Nails are painful 1-10 with direct palpation. Q7   []Yes  []No                Q8   [x]Yes  []No                     Q9   []Yes    []No  Assessment:  77 y.o. female with:    Diagnosis Orders   1. Bilateral foot pain  28771 - [place] DEBRIDEMENT OF NAILS, 6 OR MORE   2. Dermatophytosis of nail  39109 - [place] DEBRIDEMENT OF NAILS, 6 OR MORE   3. Peripheral [facility] disorder ([facility]  88989 - [place] DEBRIDEMENT OF NAILS, 6 OR MORE       Plan:   Pt was evaluated and examined.  Patient was given personalized discharge instructions. Nails 1-10 were debrided in length and thickness sharply with a nail nipper and  without incident. Pt will follow up in 9 weeks or sooner if any problems arise. Diagnosis was discussed with the pt and all of their questions were answered in detail. Proper foot hygiene and care was discussed with the pt. Patient to check feet daily and contact the office with any questions/problems/concerns. Other comorbidity noted and will be managed by PCP. Pain waiver discussed with patient and confirmed.    11/19/2020      Electronically signed by Nic Hernandez DPM on 11/19/2020 at 8:14 AM  11/19/2020

## 2020-11-27 RX ORDER — NAPROXEN 500 MG/1
TABLET ORAL
Qty: 56 TABLET | Refills: 0 | Status: SHIPPED | OUTPATIENT
Start: 2020-11-27 | End: 2020-12-24

## 2020-12-17 ENCOUNTER — IMMUNIZATION (OUTPATIENT)
Dept: FAMILY MEDICINE CLINIC | Age: 75
End: 2020-12-17
Payer: MEDICAID

## 2020-12-17 ENCOUNTER — HOSPITAL ENCOUNTER (OUTPATIENT)
Age: 75
Discharge: HOME | End: 2020-12-17
Payer: MEDICAID

## 2020-12-17 LAB
ABSOLUTE EOS #: 0.09 K/UL (ref 0–0.44)
ABSOLUTE EOS #: 0.09 K/UL (ref 0–0.44)
ABSOLUTE IMMATURE GRANULOCYTE: 0.03 K/UL (ref 0–0.3)
ABSOLUTE IMMATURE GRANULOCYTE: 0.03 K/UL (ref 0–0.3)
ABSOLUTE LYMPH #: 0.72 K/UL (ref 1.1–3.7)
ABSOLUTE LYMPH #: 0.72 K/UL (ref 1.1–3.7)
ABSOLUTE MONO #: 0.44 K/UL (ref 0.1–1.2)
ABSOLUTE MONO #: 0.44 K/UL (ref 0.1–1.2)
ALBUMIN SERPL-MCNC: 3.5 G/DL (ref 3.5–5.2)
ALBUMIN/GLOBULIN RATIO: 1.5 (ref 1–2.5)
ALP BLD-CCNC: 75 U/L (ref 35–104)
ALT SERPL-CCNC: 6 U/L (ref 5–33)
ANION GAP SERPL CALCULATED.3IONS-SCNC: 12 MMOL/L (ref 9–17)
AST SERPL-CCNC: 18 U/L
BASOPHILS # BLD: 1 % (ref 0–2)
BASOPHILS # BLD: 1 % (ref 0–2)
BASOPHILS ABSOLUTE: 0.05 K/UL (ref 0–0.2)
BASOPHILS ABSOLUTE: 0.05 K/UL (ref 0–0.2)
BILIRUB SERPL-MCNC: 0.18 MG/DL (ref 0.3–1.2)
BUN BLDV-MCNC: 28 MG/DL (ref 8–23)
BUN/CREAT BLD: ABNORMAL (ref 9–20)
CALCIUM SERPL-MCNC: 9 MG/DL (ref 8.6–10.4)
CARBAMAZEPINE DATE LAST DOSE: NORMAL
CARBAMAZEPINE DOSE AMOUNT: NORMAL
CARBAMAZEPINE DOSE TIME: NORMAL
CARBAMAZEPINE LEVEL: 7.4 UG/ML (ref 4–12)
CHLORIDE BLD-SCNC: 105 MMOL/L (ref 98–107)
CO2: 25 MMOL/L (ref 20–31)
CREAT SERPL-MCNC: 0.62 MG/DL (ref 0.5–0.9)
DIFFERENTIAL TYPE: ABNORMAL
DIFFERENTIAL TYPE: ABNORMAL
EOSINOPHILS RELATIVE PERCENT: 2 % (ref 1–4)
EOSINOPHILS RELATIVE PERCENT: 2 % (ref 1–4)
GFR AFRICAN AMERICAN: >60 ML/MIN
GFR NON-AFRICAN AMERICAN: >60 ML/MIN
GFR SERPL CREATININE-BSD FRML MDRD: ABNORMAL ML/MIN/{1.73_M2}
GFR SERPL CREATININE-BSD FRML MDRD: ABNORMAL ML/MIN/{1.73_M2}
GLUCOSE BLD-MCNC: 91 MG/DL (ref 70–99)
HCT VFR BLD CALC: 37.8 % (ref 36.3–47.1)
HCT VFR BLD CALC: 37.8 % (ref 36.3–47.1)
HEMOGLOBIN: 12.2 G/DL (ref 11.9–15.1)
HEMOGLOBIN: 12.2 G/DL (ref 11.9–15.1)
IMMATURE GRANULOCYTES: 1 %
IMMATURE GRANULOCYTES: 1 %
LYMPHOCYTES # BLD: 13 % (ref 24–43)
LYMPHOCYTES # BLD: 13 % (ref 24–43)
MCH RBC QN AUTO: 33.4 PG (ref 25.2–33.5)
MCH RBC QN AUTO: 33.4 PG (ref 25.2–33.5)
MCHC RBC AUTO-ENTMCNC: 32.3 G/DL (ref 28.4–34.8)
MCHC RBC AUTO-ENTMCNC: 32.3 G/DL (ref 28.4–34.8)
MCV RBC AUTO: 103.6 FL (ref 82.6–102.9)
MCV RBC AUTO: 103.6 FL (ref 82.6–102.9)
MONOCYTES # BLD: 8 % (ref 3–12)
MONOCYTES # BLD: 8 % (ref 3–12)
NRBC AUTOMATED: 0 PER 100 WBC
NRBC AUTOMATED: 0 PER 100 WBC
PDW BLD-RTO: 13.3 % (ref 11.8–14.4)
PDW BLD-RTO: 13.3 % (ref 11.8–14.4)
PLATELET # BLD: ABNORMAL K/UL (ref 138–453)
PLATELET # BLD: ABNORMAL K/UL (ref 138–453)
PLATELET ESTIMATE: ABNORMAL
PLATELET ESTIMATE: ABNORMAL
PLATELET, FLUORESCENCE: 174 K/UL (ref 138–453)
PLATELET, FLUORESCENCE: 174 K/UL (ref 138–453)
PLATELET, IMMATURE FRACTION: 7.8 % (ref 1.1–10.3)
PLATELET, IMMATURE FRACTION: 7.8 % (ref 1.1–10.3)
PMV BLD AUTO: ABNORMAL FL (ref 8.1–13.5)
PMV BLD AUTO: ABNORMAL FL (ref 8.1–13.5)
POTASSIUM SERPL-SCNC: 4.2 MMOL/L (ref 3.7–5.3)
RBC # BLD: 3.65 M/UL (ref 3.95–5.11)
RBC # BLD: 3.65 M/UL (ref 3.95–5.11)
RBC # BLD: ABNORMAL 10*6/UL
RBC # BLD: ABNORMAL 10*6/UL
SEG NEUTROPHILS: 77 % (ref 36–65)
SEG NEUTROPHILS: 77 % (ref 36–65)
SEGMENTED NEUTROPHILS ABSOLUTE COUNT: 4.44 K/UL (ref 1.5–8.1)
SEGMENTED NEUTROPHILS ABSOLUTE COUNT: 4.44 K/UL (ref 1.5–8.1)
SODIUM BLD-SCNC: 142 MMOL/L (ref 135–144)
TOTAL PROTEIN: 5.8 G/DL (ref 6.4–8.3)
VALPROIC ACID LEVEL: 39 UG/ML (ref 50–125)
VALPROIC DATE LAST DOSE: ABNORMAL
VALPROIC DOSE AMOUNT: ABNORMAL
VALPROIC TIME LAST DOSE: ABNORMAL
WBC # BLD: 5.8 K/UL (ref 3.5–11.3)
WBC # BLD: 5.8 K/UL (ref 3.5–11.3)
WBC # BLD: ABNORMAL 10*3/UL
WBC # BLD: ABNORMAL 10*3/UL

## 2020-12-17 PROCEDURE — 80164 ASSAY DIPROPYLACETIC ACD TOT: CPT

## 2020-12-17 PROCEDURE — 90471 IMMUNIZATION ADMIN: CPT | Performed by: FAMILY MEDICINE

## 2020-12-17 PROCEDURE — 90694 VACC AIIV4 NO PRSRV 0.5ML IM: CPT | Performed by: FAMILY MEDICINE

## 2020-12-17 PROCEDURE — 85025 COMPLETE CBC W/AUTO DIFF WBC: CPT

## 2020-12-17 PROCEDURE — 85055 RETICULATED PLATELET ASSAY: CPT

## 2020-12-17 PROCEDURE — 80156 ASSAY CARBAMAZEPINE TOTAL: CPT

## 2020-12-17 PROCEDURE — 36415 COLL VENOUS BLD VENIPUNCTURE: CPT

## 2020-12-17 PROCEDURE — 80053 COMPREHEN METABOLIC PANEL: CPT

## 2020-12-18 LAB
ALT SERPL-CCNC: 6 U/L (ref 5–33)
AST SERPL-CCNC: 18 U/L
CARBAMAZEPINE DATE LAST DOSE: NORMAL
CARBAMAZEPINE DOSE AMOUNT: NORMAL
CARBAMAZEPINE DOSE TIME: NORMAL
CARBAMAZEPINE LEVEL: 7.4 UG/ML (ref 4–12)
VALPROIC ACID LEVEL: 39 UG/ML (ref 50–125)
VALPROIC DATE LAST DOSE: ABNORMAL
VALPROIC DOSE AMOUNT: ABNORMAL
VALPROIC TIME LAST DOSE: ABNORMAL

## 2020-12-24 RX ORDER — NAPROXEN 500 MG/1
TABLET ORAL
Qty: 56 TABLET | Refills: 0 | Status: SHIPPED | OUTPATIENT
Start: 2020-12-24 | End: 2021-01-20

## 2020-12-24 NOTE — TELEPHONE ENCOUNTER
Jan Israel is calling to request a refill on the following medication(s):    Medication Request:  Requested Prescriptions     Pending Prescriptions Disp Refills    naproxen (NAPROSYN) 500 MG tablet [Pharmacy Med Name: NAPROXEN 500MG TAB] 56 tablet 0     Sig: TAKE 1 TABLET BY MOUTH TWICE A DAY       Last Visit Date (If Applicable):  Visit date not found    Next Visit Date:    Visit date not found

## 2021-01-01 ENCOUNTER — TELEPHONE (OUTPATIENT)
Dept: NEUROLOGY | Age: 76
End: 2021-01-01

## 2021-01-01 ENCOUNTER — TELEPHONE (OUTPATIENT)
Dept: FAMILY MEDICINE CLINIC | Age: 76
End: 2021-01-01

## 2021-01-01 ENCOUNTER — OFFICE VISIT (OUTPATIENT)
Dept: FAMILY MEDICINE CLINIC | Age: 76
End: 2021-01-01
Payer: MEDICAID

## 2021-01-01 ENCOUNTER — OFFICE VISIT (OUTPATIENT)
Dept: ORTHOPEDIC SURGERY | Age: 76
End: 2021-01-01

## 2021-01-01 ENCOUNTER — OFFICE VISIT (OUTPATIENT)
Dept: PODIATRY | Age: 76
End: 2021-01-01
Payer: MEDICAID

## 2021-01-01 ENCOUNTER — OFFICE VISIT (OUTPATIENT)
Dept: NEUROLOGY | Age: 76
End: 2021-01-01
Payer: MEDICAID

## 2021-01-01 VITALS — WEIGHT: 150 LBS | RESPIRATION RATE: 16 BRPM | HEIGHT: 60 IN | BODY MASS INDEX: 29.45 KG/M2

## 2021-01-01 VITALS — RESPIRATION RATE: 16 BRPM | BODY MASS INDEX: 29.45 KG/M2 | HEIGHT: 60 IN | WEIGHT: 150 LBS

## 2021-01-01 VITALS — BODY MASS INDEX: 26.31 KG/M2 | HEIGHT: 60 IN | WEIGHT: 134 LBS

## 2021-01-01 VITALS
HEIGHT: 60 IN | DIASTOLIC BLOOD PRESSURE: 100 MMHG | BODY MASS INDEX: 29.45 KG/M2 | SYSTOLIC BLOOD PRESSURE: 141 MMHG | HEART RATE: 70 BPM | WEIGHT: 150 LBS

## 2021-01-01 VITALS — SYSTOLIC BLOOD PRESSURE: 136 MMHG | OXYGEN SATURATION: 94 % | HEART RATE: 81 BPM | DIASTOLIC BLOOD PRESSURE: 64 MMHG

## 2021-01-01 DIAGNOSIS — L60.0 INGROWING NAIL: ICD-10-CM

## 2021-01-01 DIAGNOSIS — B35.1 DERMATOPHYTOSIS OF NAIL: Primary | ICD-10-CM

## 2021-01-01 DIAGNOSIS — G40.909 SEIZURE DISORDER (HCC): ICD-10-CM

## 2021-01-01 DIAGNOSIS — S81.801D WOUND OF RIGHT LOWER EXTREMITY, SUBSEQUENT ENCOUNTER: Primary | ICD-10-CM

## 2021-01-01 DIAGNOSIS — H61.23 BILATERAL HEARING LOSS DUE TO CERUMEN IMPACTION: Primary | ICD-10-CM

## 2021-01-01 DIAGNOSIS — M79.672 LEFT FOOT PAIN: ICD-10-CM

## 2021-01-01 DIAGNOSIS — Z51.81 MEDICATION MONITORING ENCOUNTER: ICD-10-CM

## 2021-01-01 DIAGNOSIS — S72.491D CLOSED COMMINUTED INTRA-ARTICULAR FRACTURE OF DISTAL END OF RIGHT FEMUR WITH ROUTINE HEALING, SUBSEQUENT ENCOUNTER: Primary | ICD-10-CM

## 2021-01-01 DIAGNOSIS — Z23 IMMUNIZATION DUE: ICD-10-CM

## 2021-01-01 DIAGNOSIS — R41.3 MEMORY LOSS: ICD-10-CM

## 2021-01-01 DIAGNOSIS — S81.801D WOUND OF RIGHT LOWER EXTREMITY, SUBSEQUENT ENCOUNTER: ICD-10-CM

## 2021-01-01 DIAGNOSIS — R26.9 GAIT DISTURBANCE: ICD-10-CM

## 2021-01-01 DIAGNOSIS — F89 DEVELOPMENTAL DISORDER: ICD-10-CM

## 2021-01-01 DIAGNOSIS — I73.9 PERIPHERAL VASCULAR DISORDER (HCC): ICD-10-CM

## 2021-01-01 DIAGNOSIS — R23.8: Primary | ICD-10-CM

## 2021-01-01 PROCEDURE — 11721 DEBRIDE NAIL 6 OR MORE: CPT | Performed by: PODIATRIST

## 2021-01-01 PROCEDURE — 11730 AVULSION NAIL PLATE SIMPLE 1: CPT | Performed by: PODIATRIST

## 2021-01-01 PROCEDURE — 90471 IMMUNIZATION ADMIN: CPT | Performed by: STUDENT IN AN ORGANIZED HEALTH CARE EDUCATION/TRAINING PROGRAM

## 2021-01-01 PROCEDURE — 99024 POSTOP FOLLOW-UP VISIT: CPT | Performed by: ORTHOPAEDIC SURGERY

## 2021-01-01 PROCEDURE — 99214 OFFICE O/P EST MOD 30 MIN: CPT | Performed by: PSYCHIATRY & NEUROLOGY

## 2021-01-01 PROCEDURE — 99214 OFFICE O/P EST MOD 30 MIN: CPT | Performed by: PODIATRIST

## 2021-01-01 PROCEDURE — 90694 VACC AIIV4 NO PRSRV 0.5ML IM: CPT | Performed by: STUDENT IN AN ORGANIZED HEALTH CARE EDUCATION/TRAINING PROGRAM

## 2021-01-01 PROCEDURE — 99213 OFFICE O/P EST LOW 20 MIN: CPT | Performed by: STUDENT IN AN ORGANIZED HEALTH CARE EDUCATION/TRAINING PROGRAM

## 2021-01-01 RX ORDER — DIVALPROEX SODIUM 250 MG/1
TABLET, DELAYED RELEASE ORAL
Qty: 60 TABLET | Refills: 5 | Status: SHIPPED | OUTPATIENT
Start: 2021-01-01 | End: 2021-01-01 | Stop reason: SDUPTHER

## 2021-01-01 RX ORDER — CARBAMAZEPINE 200 MG/1
800 CAPSULE, EXTENDED RELEASE ORAL 2 TIMES DAILY
Qty: 240 CAPSULE | Refills: 3 | Status: SHIPPED | OUTPATIENT
Start: 2021-01-01 | End: 2022-01-01

## 2021-01-01 RX ORDER — SULFAMETHOXAZOLE AND TRIMETHOPRIM 800; 160 MG/1; MG/1
1 TABLET ORAL 2 TIMES DAILY
Qty: 14 TABLET | Refills: 0 | Status: SHIPPED | OUTPATIENT
Start: 2021-01-01 | End: 2021-01-01 | Stop reason: SDUPTHER

## 2021-01-01 RX ORDER — CARBAMAZEPINE 200 MG/1
CAPSULE, EXTENDED RELEASE ORAL
Qty: 240 CAPSULE | Refills: 3 | Status: SHIPPED | OUTPATIENT
Start: 2021-01-01 | End: 2021-01-01 | Stop reason: SDUPTHER

## 2021-01-01 RX ORDER — DIVALPROEX SODIUM 500 MG/1
TABLET, DELAYED RELEASE ORAL
Qty: 120 TABLET | Refills: 5 | Status: SHIPPED | OUTPATIENT
Start: 2021-01-01 | End: 2021-01-01 | Stop reason: SDUPTHER

## 2021-01-01 RX ORDER — SULFAMETHOXAZOLE AND TRIMETHOPRIM 800; 160 MG/1; MG/1
1 TABLET ORAL 2 TIMES DAILY
Qty: 14 TABLET | Refills: 0 | Status: SHIPPED | OUTPATIENT
Start: 2021-01-01 | End: 2021-01-01

## 2021-01-01 RX ORDER — DIVALPROEX SODIUM 250 MG/1
TABLET, DELAYED RELEASE ORAL
Qty: 60 TABLET | Refills: 5 | Status: SHIPPED | OUTPATIENT
Start: 2021-01-01 | End: 2022-01-01

## 2021-01-01 RX ORDER — ZINC OXIDE
OINTMENT (GRAM) TOPICAL
Qty: 113 G | Refills: 2 | Status: SHIPPED | OUTPATIENT
Start: 2021-01-01 | End: 2022-01-01 | Stop reason: SDUPTHER

## 2021-01-01 RX ORDER — DIVALPROEX SODIUM 500 MG/1
TABLET, DELAYED RELEASE ORAL
Qty: 120 TABLET | Refills: 5 | Status: SHIPPED | OUTPATIENT
Start: 2021-01-01 | End: 2022-01-01

## 2021-01-01 RX ORDER — BUMETANIDE 1 MG/1
1 TABLET ORAL DAILY
Qty: 30 TABLET | Refills: 5 | Status: SHIPPED | OUTPATIENT
Start: 2021-01-01 | End: 2021-01-01 | Stop reason: SDUPTHER

## 2021-01-01 RX ORDER — BUMETANIDE 1 MG/1
1 TABLET ORAL DAILY
Qty: 30 TABLET | Refills: 5 | Status: SHIPPED | OUTPATIENT
Start: 2021-01-01 | End: 2022-01-01

## 2021-01-01 ASSESSMENT — ENCOUNTER SYMPTOMS
ALLERGIC/IMMUNOLOGIC NEGATIVE: 1
EYES NEGATIVE: 1
COLOR CHANGE: 0
GASTROINTESTINAL NEGATIVE: 1
RESPIRATORY NEGATIVE: 1

## 2021-01-20 RX ORDER — NAPROXEN 500 MG/1
TABLET ORAL
Qty: 56 TABLET | Refills: 0 | Status: SHIPPED | OUTPATIENT
Start: 2021-01-20 | End: 2021-02-17

## 2021-01-21 ENCOUNTER — OFFICE VISIT (OUTPATIENT)
Dept: PODIATRY | Age: 76
End: 2021-01-21
Payer: MEDICAID

## 2021-01-21 VITALS — HEIGHT: 63 IN | BODY MASS INDEX: 24.8 KG/M2 | WEIGHT: 140 LBS | TEMPERATURE: 97.2 F | RESPIRATION RATE: 18 BRPM

## 2021-01-21 DIAGNOSIS — M21.619 BUNION: ICD-10-CM

## 2021-01-21 DIAGNOSIS — M79.671 BILATERAL FOOT PAIN: Primary | ICD-10-CM

## 2021-01-21 DIAGNOSIS — M79.672 BILATERAL FOOT PAIN: Primary | ICD-10-CM

## 2021-01-21 DIAGNOSIS — I73.9 PERIPHERAL VASCULAR DISORDER (HCC): ICD-10-CM

## 2021-01-21 DIAGNOSIS — B35.1 DERMATOPHYTOSIS OF NAIL: ICD-10-CM

## 2021-01-21 PROCEDURE — 11721 DEBRIDE NAIL 6 OR MORE: CPT | Performed by: PODIATRIST

## 2021-01-21 NOTE — PROGRESS NOTES
[facility] PHYSICIANS  [facility]  [address] [address] [address] [place] [address]  Dept: [phone]  Dept Fax: [phone]     PAIN PROGRESS NOTE  Date of patient's visit: 1/21/2021  Patient's Name:  Ekta Eller YOB: 1945            Patient Care Team:  Sony Mejia MD as PCP - General  Sheila Richter DPM as Physician (Podiatry)      Chief Complaint   Patient presents with    Foot Pain    Nail Problem       Subjective: This Ekta Eller comes to clinic for foot and nail care. Pt currently has complaint of thickened, painful, elongated nails that he/she cannot manage by themselves. Pt. Relates pain to nails with shoe gear. Pt's primary care physician is Sony Mejia MD last seen 12/17/2020. Pt has a new complaint of NONE. Past Medical History:   Diagnosis Date    Anxiety     Arthritis     Cataracts, bilateral     Fracture (healed) treatment follow-up 2015    hand fx post fall    Mental retardation     Movement disorder     Primary osteoarthritis involving multiple joints 2/17/2017    Rheumatoid arthritis ([facility]     Seizures (HCC)        No Known Allergies  Current Outpatient Medications on File Prior to Visit   Medication Sig Dispense Refill    naproxen (NAPROSYN) 500 MG tablet TAKE 1 TABLET BY MOUTH TWICE A DAY 56 tablet 0    divalproex (DEPAKOTE) 250 MG DR tablet Take 1 po bid . Total 1250 mg po bid 60 tablet 11    carBAMazepine (CARBATROL) 200 MG extended release capsule Take 4 po bid 240 capsule 11    divalproex (DEPAKOTE) 500 MG DR tablet Take 2 po bid.  Total 1250 mg po bid 120 tablet 11    divalproex (DEPAKOTE) 500 MG DR tablet TAKE 2 TABLETS BY MOUTH TWICE A  tablet 0    STOOL SOFTENER 100 MG capsule TAKE 1 CAPSULE BY MOUTH TWICE A DAY AS NEEDED FOR CONSTIPATION 28 capsule 3    bumetanide (BUMEX) 1 MG tablet TAKE 1 TABLET BY MOUTH DAILY 28 tablet 3    carBAMazepine (CARBATROL) 200 MG extended release capsule TAKE 4 CAPSULES BY MOUTH TWICE A DAY. Give second dose at bedtime. 720 capsule 3    alendronate (FOSAMAX) 70 MG tablet Take 1 tablet by mouth every 7 days 4 tablet 11    CARBATROL 200 MG extended release capsule TAKE 4 CAPSULES TWICE A  capsule 0    divalproex (DEPAKOTE) 250 MG DR tablet TAKE 1 TABLET BY MOUTH TWICE A DAY 56 tablet 0     No current facility-administered medications on file prior to visit. Review of Systems. Review of Systems:   History obtained from chart review and the patient  General ROS: negative for - chills, fatigue, fever, night sweats or weight gain  Constitutional: Negative for chills, diaphoresis, fatigue, fever and unexpected weight change. Musculoskeletal: Positive for arthralgias, gait problem and joint swelling. Neurological ROS: negative for - behavioral changes, confusion, headaches or seizures. Negative for weakness and numbness. Dermatological ROS: negative for - mole changes, rash  Cardiovascular: Negative for leg swelling. Gastrointestinal: Negative for constipation, diarrhea, nausea and vomiting. Objective:  Dermatologic Exam:  Skin lesion/ulceration Absent . Skin No rashes or nodules noted. .   Skin is thin, with flaky sloughing skin as well as decreased hair growth to the lower leg  Small red hemosiderin deposits seen dorsal foot   Musculoskeletal:     1st MPJ ROM decreased, Bilateral.  Muscle strength 5/5, Bilateral.  Pain present upon palpation of toenails 1-5, Bilateral. decreased medial longitudinal arch, Bilateral.  Ankle ROM decreased,Bilateral.    Dorsally contracted digits present digits 2, Bilateral.     Vascular: DP pulses 1/4 bilateral.  PT pulses 0/4 bilateral.   CFT <5 seconds, Bilateral.  Hair growth absent to the level of the digits, Bilateral.  Edema present, Bilateral.  Varicosities absent, Bilateral. Erythema absent, Bilateral    Neurological: Sensation diminshed to light touch to level of digits, Bilateral.  Protective sensation intact 6/10 sites via 5.07/10g [place]-Johanna Monofilament, Bilateral.  negative Tinel's, Bilateral.  negative Valleix sign, Bilateral.      Integument: Warm, dry, supple, Bilateral.  Open lesion absent, Bilateral.  Interdigital maceration absent to web spaces 4, Bilateral.  Nails 1-5 left and 1-5 right thickened > 3.0 mm, dystrophic and crumbly, discolored with subungual debris. Fissures absent, Bilateral.   General: AAO x 3 in NAD. Derm  Toenail Description  Sites of Onychomycosis Involvement (Check affected area)  [x] [x] [x] [x] [x] [x] [x] [x] [x] [x]  5 4 3 2 1 1 2 3 4 5                          Right                                        Left    Thickness  [x] [x] [x] [x] [x] [x] [x] [x] [x] [x]  5 4 3 2 1 1 2 3 4 5                         Right                                        Left    Dystrophic Changes   [x] [x] [x] [x] [x] [x] [x] [x] [x] [x]  5 4 3 2 1 1 2 3 4 5                         Right                                        Left    Color  [x] [x] [x] [x] [x] [x] [x] [x] [x] [x]  5 4 3 2 1 1 2 3 4 5                          Right                                        Left    Incurvation/Ingrowin   [] [] [] [] [] [] [] [] [] []  5 4 3 2 1 1 2 3 4 5                         Right                                        Left    Inflammation/Pain   [x] [x] [x] [x] [x] [x] [x] [x] [x] [x]  5 4 3  2 1 1 2 3 4 5                         Right                                        Left       Nails are painful 1-10 with direct palpation. Q7   []Yes  []No                Q8   [x]Yes  []No                     Q9   []Yes    []No  Assessment:  77 y.o. female with:    Diagnosis Orders   1. Bilateral foot pain  00107 - [place] DEBRIDEMENT OF NAILS, 6 OR MORE   2. Dermatophytosis of nail  32709 - [place] DEBRIDEMENT OF NAILS, 6 OR MORE   3. Peripheral vascular disorder (HCC)  32748 - [place] DEBRIDEMENT OF NAILS, 6 OR MORE   4. Bunion  13721 - [place] DEBRIDEMENT OF NAILS, 6 OR MORE         Plan:   Pt was evaluated and examined. Patient was given personalized discharge instructions. Nails 1-10 were debrided in length and thickness sharply with a nail nipper and  without incident. Pt will follow up in 9 weeks or sooner if any problems arise. Diagnosis was discussed with the pt and all of their questions were answered in detail. Proper foot hygiene and care was discussed with the pt. Patient to check feet daily and contact the office with any questions/problems/concerns. Other comorbidity noted and will be managed by PCP. Pain waiver discussed with patient and confirmed.    1/21/2021      Electronically signed by [facility] Luis Lozano DPM on 1/21/2021 at 9:09 AM  1/21/2021

## 2021-02-01 DIAGNOSIS — Z51.81 MEDICATION MONITORING ENCOUNTER: Primary | ICD-10-CM

## 2021-02-01 RX ORDER — BUMETANIDE 1 MG/1
1 TABLET ORAL DAILY
Qty: 30 TABLET | Refills: 2 | Status: SHIPPED | OUTPATIENT
Start: 2021-02-01 | End: 2021-04-12

## 2021-02-01 NOTE — TELEPHONE ENCOUNTER
Salina Mejía is calling to request a refill on the following medication(s):    Medication Request:  Requested Prescriptions     Pending Prescriptions Disp Refills    bumetanide (BUMEX) 1 MG tablet 28 tablet 3       Last Visit Date (If Applicable):  Visit date not found    Next Visit Date:    3/1/2021

## 2021-02-01 NOTE — TELEPHONE ENCOUNTER
Patient requesting a medication refill. Medication: Stool softener & bumetanide (BUMEX) 1 MG tablet  [facility]  Next office visit: 3/1/2021    Pharmacy is set to deliver on Wed needs time to fill. Pt is in transition from Dr. Misha Valverde to Madalynn Boxer. Can Pt get these meds sent over  Until she is seen on 3.1.21

## 2021-03-01 ENCOUNTER — TELEPHONE (OUTPATIENT)
Dept: FAMILY MEDICINE CLINIC | Age: 76
End: 2021-03-01

## 2021-03-05 ENCOUNTER — TELEPHONE (OUTPATIENT)
Dept: FAMILY MEDICINE CLINIC | Age: 76
End: 2021-03-05

## 2021-03-05 DIAGNOSIS — M15.9 PRIMARY OSTEOARTHRITIS INVOLVING MULTIPLE JOINTS: Primary | ICD-10-CM

## 2021-03-05 RX ORDER — NAPROXEN 500 MG/1
500 TABLET ORAL 2 TIMES DAILY WITH MEALS
Qty: 60 TABLET | Refills: 2 | Status: SHIPPED | OUTPATIENT
Start: 2021-03-05 | End: 2021-06-09

## 2021-03-05 NOTE — TELEPHONE ENCOUNTER
The home where Angella Ayala lives is wondering why the sig to her naproxen changed to  2 times daily prn when pt has always had orders to take 1 tablet bid.   There was never a prn before and they need it to say daily

## 2021-03-05 NOTE — TELEPHONE ENCOUNTER
Ricarda Esquivel is calling to request a refill on the following medication(s):    Medication Request:  Requested Prescriptions     Pending Prescriptions Disp Refills    naproxen (NAPROSYN) 500 MG tablet 60 tablet 3       Last Visit Date (If Applicable):  7/7/0359    Next Visit Date:    7/9/2021

## 2021-03-25 ENCOUNTER — OFFICE VISIT (OUTPATIENT)
Dept: PODIATRY | Age: 76
End: 2021-03-25
Payer: MEDICAID

## 2021-03-25 VITALS — HEIGHT: 63 IN | RESPIRATION RATE: 16 BRPM | BODY MASS INDEX: 24.8 KG/M2 | WEIGHT: 140 LBS | TEMPERATURE: 97.8 F

## 2021-03-25 DIAGNOSIS — M79.672 BILATERAL FOOT PAIN: ICD-10-CM

## 2021-03-25 DIAGNOSIS — M79.671 BILATERAL FOOT PAIN: ICD-10-CM

## 2021-03-25 DIAGNOSIS — B35.1 DERMATOPHYTOSIS OF NAIL: Primary | ICD-10-CM

## 2021-03-25 DIAGNOSIS — I73.9 PERIPHERAL VASCULAR DISORDER (HCC): ICD-10-CM

## 2021-03-25 PROCEDURE — 11721 DEBRIDE NAIL 6 OR MORE: CPT | Performed by: PODIATRIST

## 2021-03-25 NOTE — PROGRESS NOTES
[facility] PHYSICIANS  [facility]  [address] [place] [address]  Dept: [phone]  Dept Fax: [phone]     PAIN PROGRESS NOTE  Date of patient's visit: 3/25/2021  Patient's Name:  Destin Michel YOB: 1945            Patient Care Team:  Makenna Ackerman DO as PCP - General (Family Medicine)  Makenna Ackerman DO as PCP - [facility] Mansoor Jacobs Empaneled Provider  Adithya Parra DPM as Physician (Podiatry)      Chief Complaint   Patient presents with    Foot Pain    Nail Problem       Subjective: This Destin Michel comes to clinic for foot and nail care. Pt currently has complaint of thickened, painful, elongated nails that he/she cannot manage by themselves. Pt. Relates pain to nails with shoe gear. Pt's primary care physician is Makenna Ackerman DO last seen 2/24/2021. Pt has a new complaint of NONE. Past Medical History:   Diagnosis Date    Anxiety     Arthritis     Cataracts, bilateral     Fracture (healed) treatment follow-up 2015    hand fx post fall    Mental retardation     Movement disorder     Primary osteoarthritis involving multiple joints 2/17/2017    Rheumat[facility]     Seizures ([facility])        No Known Allergies  Current Outpatient Medications on File Prior to Visit   Medication Sig Dispense Refill    naproxen (NAPROSYN) 500 MG tablet Take 1 tablet by mouth 2 times daily (with meals) 60 tablet 2    naproxen (NAPROSYN) 500 MG tablet Take 1 tablet by mouth 2 times daily as needed for Pain 60 tablet 0    bumetanide (BUMEX) 1 MG tablet Take 1 tablet by mouth daily 30 tablet 2    divalproex (DEPAKOTE) 250 MG DR tablet Take 1 po bid . Total 1250 mg po bid 60 tablet 11    carBAMazepine (CARBATROL) 200 MG extended release capsule Take 4 po bid 240 capsule 11    divalproex (DEPAKOTE) 500 MG DR tablet Take 2 po bid.  Total 1250 mg po bid 120 tablet 11    divalproex (DEPAKOTE) 500 MG DR tablet TAKE 2 TABLETS BY MOUTH TWICE A DAY 112 tablet 0    divalproex (DEPAKOTE) 250 MG DR tablet TAKE 1 TABLET BY MOUTH TWICE A DAY 56 tablet 0    STOOL SOFTENER 100 MG capsule TAKE 1 CAPSULE BY MOUTH TWICE A DAY AS NEEDED FOR CONSTIPATION 28 capsule 3    carBAMazepine (CARBATROL) 200 MG extended release capsule TAKE 4 CAPSULES BY MOUTH TWICE A DAY. Give second dose at bedtime. 720 capsule 3    alendronate (FOSAMAX) 70 MG tablet Take 1 tablet by mouth every 7 days 4 tablet 11    CARBATROL 200 MG extended release capsule TAKE 4 CAPSULES TWICE A  capsule 0     No current facility-administered medications on file prior to visit. Review of Systems. Review of Systems:   History obtained from chart review and the patient  General ROS: negative for - chills, fatigue, fever, night sweats or weight gain  Constitutional: Negative for chills, diaphoresis, fatigue, fever and unexpected weight change. Musculoskeletal: Positive for arthralgias, gait problem and joint swelling. Neurological ROS: negative for - behavioral changes, confusion, headaches or seizures. Negative for weakness and numbness. Dermatological ROS: negative for - mole changes, rash  Cardiovascular: Negative for leg swelling. Gastrointestinal: Negative for constipation, diarrhea, nausea and vomiting. Objective:  Dermatologic Exam:  Skin lesion/ulceration Absent . Skin No rashes or nodules noted. .   Skin is thin, with flaky sloughing skin as well as decreased hair growth to the lower leg  Small red hemosiderin deposits seen dorsal foot   Musculoskeletal:     1st MPJ ROM decreased, Bilateral.  Muscle strength 5/5, Bilateral.  Pain present upon palpation of toenails 1-5, Bilateral. decreased medial longitudinal arch, Bilateral.  Ankle ROM decreased,Bilateral.    Dorsally contracted digits present digits 2, Bilateral.     Vascular: DP pulses 1/4 bilateral.  PT pulses 0/4 bilateral.   CFT <5 seconds, Bilateral.  Hair growth absent to the level of the digits, Bilateral. Edema present, Bilateral.  Varicosities absent, Bilateral. Erythema absent, Bilateral    Neurological: Sensation diminshed to light touch to level of digits, Bilateral.  Protective sensation intact 6/10 sites via 5.07/10g [place]-Johanna Monofilament, Bilateral.  negative Tinel's, Bilateral.  negative Valleix sign, Bilateral.      Integument: Warm, dry, supple, Bilateral.  Open lesion absent, Bilateral.  Interdigital maceration absent to web spaces 4, Bilateral.  Nails 1-5 left and 1-5 right thickened > 3.0 mm, dystrophic and crumbly, discolored with subungual debris. Fissures absent, Bilateral.   General: AAO x 3 in NAD. Derm  Toenail Description  Sites of Onychomycosis Involvement (Check affected area)  [x] [x] [x] [x] [x] [x] [x] [x] [x] [x]  5 4 3 2 1 1 2 3 4 5                          Right                                        Left    Thickness  [x] [x] [x] [x] [x] [x] [x] [x] [x] [x]  5 4 3 2 1 1 2 3 4 5                         Right                                        Left    Dystrophic Changes   [x] [x] [x] [x] [x] [x] [x] [x] [x] [x]  5 4 3 2 1 1 2 3 4 5                         Right                                        Left    Color  [x] [x] [x] [x] [x] [x] [x] [x] [x] [x]  5 4 3 2 1 1 2 3 4 5                          Right                                        Left    Incurvation/Ingrowin   [] [] [] [] [] [] [] [] [] []  5 4 3 2 1 1 2 3 4 5                         Right                                        Left    Inflammation/Pain   [x] [x] [x] [x] [x] [x] [x] [x] [x] [x]  5 4 3  2 1 1 2 3 4 5                         Right                                        Left       Nails are painful 1-10 with direct palpation. Q7   []Yes  []No                Q8   [x]Yes  []No                     Q9   []Yes    []No  Assessment:  77 y.o. female with:    Diagnosis Orders   1. Dermatophytosis of nail  56876 - [place] DEBRIDEMENT OF NAILS, 6 OR MORE   2.  Peripheral vascular disorder ([email or web address]  74205 - [place] DEBRIDEMENT OF NAILS, 6 OR MORE   3. Bilateral foot pain  94259 - [place] DEBRIDEMENT OF NAILS, 6 OR MORE       Plan:   Pt was evaluated and examined. Patient was given personalized discharge instructions. Nails 1-10 were debrided in length and thickness sharply with a nail nipper and  without incident. Pt will follow up in 9 weeks or sooner if any problems arise. Diagnosis was discussed with the pt and all of their questions were answered in detail. Proper foot hygiene and care was discussed with the pt. Patient to check feet daily and contact the office with any questions/problems/concerns. Other comorbidity noted and will be managed by PCP. Pain waiver discussed with patient and confirmed.    3/25/2021      Electronically signed by Meño Pacheco DPM on 3/25/2021 at 9:54 AM  3/25/2021

## 2021-04-12 DIAGNOSIS — Z51.81 MEDICATION MONITORING ENCOUNTER: ICD-10-CM

## 2021-04-12 NOTE — TELEPHONE ENCOUNTER
Jayne Sandifer is calling to request a refill on the following medication(s):    Medication Request:  Requested Prescriptions     Pending Prescriptions Disp Refills    bumetanide (BUMEX) 1 MG tablet [Pharmacy Med Name: Bumetanide 1MG TABS] 28 tablet 0     Sig: TAKE 1 TABLET BY MOUTH DAILY       Last Visit Date (If Applicable):  0/4/2869    Next Visit Date:    7/9/2021

## 2021-04-13 RX ORDER — BUMETANIDE 1 MG/1
1 TABLET ORAL DAILY
Qty: 28 TABLET | Refills: 0 | Status: SHIPPED | OUTPATIENT
Start: 2021-04-13 | End: 2021-04-14

## 2021-06-08 DIAGNOSIS — M15.9 PRIMARY OSTEOARTHRITIS INVOLVING MULTIPLE JOINTS: ICD-10-CM

## 2021-06-09 RX ORDER — NAPROXEN 500 MG/1
TABLET ORAL
Qty: 60 TABLET | Refills: 5 | Status: SHIPPED | OUTPATIENT
Start: 2021-06-09 | End: 2022-01-01

## 2021-06-09 NOTE — TELEPHONE ENCOUNTER
Amari Landrum is calling to request a refill on the following medication(s):    Medication Request:  Requested Prescriptions     Pending Prescriptions Disp Refills    naproxen (NAPROSYN) 500 MG tablet [Pharmacy Med Name: Naproxen 500MG TABS] 60 tablet 5     Sig: TAKE 1 TABLET BY MOUTH TWICE A DAY WITH MEALS       Last Visit Date (If Applicable):  7/89/9599    Next Visit Date:    7/9/2021

## 2021-07-07 DIAGNOSIS — Z51.81 MEDICATION MONITORING ENCOUNTER: ICD-10-CM

## 2021-07-07 RX ORDER — BUMETANIDE 1 MG/1
1 TABLET ORAL DAILY
Qty: 28 TABLET | Refills: 1 | Status: SHIPPED | OUTPATIENT
Start: 2021-07-07 | End: 2021-01-01

## 2021-07-07 NOTE — TELEPHONE ENCOUNTER
Mia Brown is calling to request a refill on the following medication(s):    Medication Request:  Requested Prescriptions     Pending Prescriptions Disp Refills    bumetanide (BUMEX) 1 MG tablet [Pharmacy Med Name: Bumetanide 1MG TABS] 28 tablet 1     Sig: TAKE 1 TABLET BY MOUTH DAILY       Last Visit Date (If Applicable):  3/45/5038    Next Visit Date:    7/9/2021

## 2021-07-09 ENCOUNTER — OFFICE VISIT (OUTPATIENT)
Dept: FAMILY MEDICINE CLINIC | Age: 76
End: 2021-07-09
Payer: MEDICAID

## 2021-07-09 VITALS — SYSTOLIC BLOOD PRESSURE: 130 MMHG | DIASTOLIC BLOOD PRESSURE: 60 MMHG | HEART RATE: 133 BPM | OXYGEN SATURATION: 72 %

## 2021-07-09 DIAGNOSIS — Z13.220 LIPID SCREENING: ICD-10-CM

## 2021-07-09 DIAGNOSIS — G40.909 SEIZURE DISORDER (HCC): Primary | Chronic | ICD-10-CM

## 2021-07-09 DIAGNOSIS — I10 ESSENTIAL (PRIMARY) HYPERTENSION: ICD-10-CM

## 2021-07-09 DIAGNOSIS — R60.0 BILATERAL LOWER EXTREMITY EDEMA: ICD-10-CM

## 2021-07-09 DIAGNOSIS — Z91.81 AT HIGH RISK FOR FALLS: ICD-10-CM

## 2021-07-09 DIAGNOSIS — M80.00XD AGE-RELATED OSTEOPOROSIS WITH CURRENT PATHOLOGICAL FRACTURE WITH ROUTINE HEALING: ICD-10-CM

## 2021-07-09 DIAGNOSIS — Z12.31 ENCOUNTER FOR SCREENING MAMMOGRAM FOR MALIGNANT NEOPLASM OF BREAST: ICD-10-CM

## 2021-07-09 DIAGNOSIS — H61.23 BILATERAL HEARING LOSS DUE TO CERUMEN IMPACTION: ICD-10-CM

## 2021-07-09 DIAGNOSIS — F79 INTELLECTUAL DISABILITY: Chronic | ICD-10-CM

## 2021-07-09 PROBLEM — S62.332A: Status: RESOLVED | Noted: 2020-02-13 | Resolved: 2021-07-09

## 2021-07-09 PROBLEM — S62.334A: Status: RESOLVED | Noted: 2020-02-13 | Resolved: 2021-07-09

## 2021-07-09 PROBLEM — M15.9 PRIMARY OSTEOARTHRITIS INVOLVING MULTIPLE JOINTS: Status: RESOLVED | Noted: 2017-02-17 | Resolved: 2021-07-09

## 2021-07-09 PROBLEM — F44.89 CONFUSIONAL STATE: Status: ACTIVE | Noted: 2021-07-09

## 2021-07-09 PROBLEM — R53.1 GENERALIZED WEAKNESS: Status: RESOLVED | Noted: 2018-09-13 | Resolved: 2021-07-09

## 2021-07-09 PROBLEM — R26.2 DIFFICULTY WALKING: Status: ACTIVE | Noted: 2019-03-06

## 2021-07-09 PROBLEM — R53.1 GENERALIZED WEAKNESS: Status: ACTIVE | Noted: 2018-09-13

## 2021-07-09 PROBLEM — F44.89 CONFUSIONAL STATE: Status: RESOLVED | Noted: 2021-07-09 | Resolved: 2021-07-09

## 2021-07-09 PROBLEM — S82.291A CLOSED RIGHT TIBIAL FRACTURE, CLOSED, INITIAL ENCOUNTER: Status: RESOLVED | Noted: 2018-07-17 | Resolved: 2021-07-09

## 2021-07-09 PROBLEM — Z99.3 DEPENDENCE ON WHEELCHAIR: Status: ACTIVE | Noted: 2019-05-02

## 2021-07-09 PROCEDURE — 99214 OFFICE O/P EST MOD 30 MIN: CPT | Performed by: FAMILY MEDICINE

## 2021-07-09 SDOH — ECONOMIC STABILITY: FOOD INSECURITY: WITHIN THE PAST 12 MONTHS, THE FOOD YOU BOUGHT JUST DIDN'T LAST AND YOU DIDN'T HAVE MONEY TO GET MORE.: NEVER TRUE

## 2021-07-09 SDOH — ECONOMIC STABILITY: FOOD INSECURITY: WITHIN THE PAST 12 MONTHS, YOU WORRIED THAT YOUR FOOD WOULD RUN OUT BEFORE YOU GOT MONEY TO BUY MORE.: NEVER TRUE

## 2021-07-09 ASSESSMENT — PATIENT HEALTH QUESTIONNAIRE - PHQ9
SUM OF ALL RESPONSES TO PHQ QUESTIONS 1-9: 0
SUM OF ALL RESPONSES TO PHQ QUESTIONS 1-9: 0
SUM OF ALL RESPONSES TO PHQ9 QUESTIONS 1 & 2: 0
SUM OF ALL RESPONSES TO PHQ QUESTIONS 1-9: 0
2. FEELING DOWN, DEPRESSED OR HOPELESS: 0
1. LITTLE INTEREST OR PLEASURE IN DOING THINGS: 0

## 2021-07-09 ASSESSMENT — ENCOUNTER SYMPTOMS
NAUSEA: 0
VISUAL CHANGE: 0
SWOLLEN GLANDS: 0
ALLERGIC/IMMUNOLOGIC NEGATIVE: 1
VOMITING: 0
BLURRED VISION: 0
EYES NEGATIVE: 1
SORE THROAT: 0
CHANGE IN BOWEL HABIT: 0
ABDOMINAL PAIN: 0
RESPIRATORY NEGATIVE: 1
SHORTNESS OF BREATH: 0
GASTROINTESTINAL NEGATIVE: 1
COUGH: 0
ORTHOPNEA: 0

## 2021-07-09 ASSESSMENT — SOCIAL DETERMINANTS OF HEALTH (SDOH): HOW HARD IS IT FOR YOU TO PAY FOR THE VERY BASICS LIKE FOOD, HOUSING, MEDICAL CARE, AND HEATING?: NOT HARD AT ALL

## 2021-07-09 NOTE — PROGRESS NOTES
[facility] Progress Note    Date:7/9/2021         Patient Dorie Kirkland     YOB: 1945     Age:76 y.o. Assessment/Plan        Problem List Items Addressed This Visit        Circulatory    Essential (primary) hypertension      Well-controlled, continue current treatment plan and lifestyle modifications recommended         Relevant Orders    CBC Auto Differential    Comprehensive Metabolic Panel       Nervous and Auditory    Seizure disorder - Primary (Chronic)      Monitored by specialist- no acute findings meriting change in the plan   Having lethargy - advised to f/u with neuro about seizure medication adjustment         Relevant Orders    CBC Auto Differential    Comprehensive Metabolic Panel       Musculoskeletal and Integument    Age-related osteoporosis with current pathological fracture with routine healing      Stable on Fosamax         Relevant Orders    CBC Auto Differential    Comprehensive Metabolic Panel       Other    Mental retardation (Chronic)      Stable         Bilateral lower extremity edema      Stable on Bumex         Relevant Orders    CBC Auto Differential    Comprehensive Metabolic Panel      Other Visit Diagnoses     At high risk for falls        Lipid screening        Relevant Orders    Lipid Panel    Bilateral hearing loss due to cerumen impaction        Relevant Orders    OBDULIA - Angelito Chacon MD, Otolaryngology, [facility]    Encounter for screening mammogram for malignant neoplasm of breast        Relevant Orders    ADRIAN DIGITAL SCREEN W OR WO CAD BILATERAL           Return in about 6 months (around 1/9/2022). Electronically signed by Alyssa Khan DO on 7/9/21         Leonor Buenrostro is a 77 y.o. female presenting today for   Chief Complaint   Patient presents with   [place] [facility] Establ[facility] Care    Ear Problem     can not ear   .     Has caretaker helping with HPI and ANNITA Buenrostro is a 77 y.o. female who I am asked to see in consultation for evaluation osteoporosis. She was diagnosed with osteoporosis by bone density scan. Patient has history of fracture. The cause of osteoporosis is felt to be due to postmenopausal estrogen deficiency. She is not currently being treated with calcium and vitamin D supplementation. She is currently being treated with bisphosphonates     Edema: Patient complains of a many year(s) history of persistent edema which has been stable with time. Location of edema: bilateral lower extremities (). The edema is present all day. The swelling has been aggravated by dependency of involved area, relieved by diuretics, elevation of involved area, and has been associated with nothing. Cerumen Impaction  Manuelito Gordon presents for evaluation of a plugged ear. She noticed the symptoms in both ears, several weeks ago. She does have a prior history of cerumen impaction. Manuelito Gordon denies ear pain. She was not using ear drops to loosen wax immediately prior to this visit. Hypertension  This is a chronic problem. The current episode started more than 1 year ago. The problem has been resolved since onset. Associated symptoms include peripheral edema. Pertinent negatives include no anxiety, blurred vision, chest pain, headaches, malaise/fatigue, neck pain, orthopnea, palpitations, PND, shortness of breath or sweats. Past treatments include lifestyle changes. The current treatment provides significant improvement. Seizures  This is a chronic problem. The current episode started more than 1 year ago. The problem occurs intermittently. The problem has been unchanged. Pertinent negatives include no abdominal pain, anorexia, arthralgias, change in bowel habit, chest pain, chills, congestion, coughing, diaphoresis, fatigue, fever, headaches, joint swelling, myalgias, nausea, neck pain, numbness, rash, sore throat, swollen glands, urinary symptoms, vertigo, visual change, vomiting or weakness. Treatments tried: 2 meds - sees Dr. [facility] neuro.        Review of Systems   Review of Systems   Constitutional: Negative. Negative for chills, diaphoresis, fatigue, fever and malaise/fatigue. HENT: Negative. Negative for congestion and sore throat. Eyes: Negative. Negative for blurred vision. Respiratory: Negative. Negative for cough and shortness of breath. Cardiovascular: Negative. Negative for chest pain, palpitations, orthopnea and PND. Gastrointestinal: Negative. Negative for abdominal pain, anorexia, change in bowel habit, nausea and vomiting. Endocrine: Negative. Genitourinary: Negative. Musculoskeletal: Negative. Negative for arthralgias, joint swelling, myalgias and neck pain. Skin: Negative. Negative for rash. Allergic/Immunologic: Negative. Neurological: Positive for seizures. Negative for vertigo, weakness, numbness and headaches. Hematological: Negative. Psychiatric/Behavioral: Negative. All other systems reviewed and are negative. Medications     Current Outpatient Medications   Medication Sig Dispense Refill    bumetanide (BUMEX) 1 MG tablet TAKE 1 TABLET BY MOUTH DAILY 28 tablet 1    naproxen (NAPROSYN) 500 MG tablet TAKE 1 TABLET BY MOUTH TWICE A DAY WITH MEALS 60 tablet 5    divalproex (DEPAKOTE) 500 MG DR tablet Take 2 po bid. Total 1250 mg po bid 120 tablet 11    STOOL SOFTENER 100 MG capsule TAKE 1 CAPSULE BY MOUTH TWICE A DAY AS NEEDED FOR CONSTIPATION 28 capsule 3    carBAMazepine (CARBATROL) 200 MG extended release capsule TAKE 4 CAPSULES BY MOUTH TWICE A DAY. Give second dose at bedtime. 720 capsule 3    alendronate (FOSAMAX) 70 MG tablet Take 1 tablet by mouth every 7 days 4 tablet 11     No current facility-administered medications for this visit.        Past History    Past Medical History:   has a past medical history of Anxiety, Arthritis, Cataracts, bilateral, Essential (primary) hypertension, Fracture (healed) treatment follow-up, Mental retardation, Movement disorder, Primary osteoarthritis involving multiple joints, Rheumatoid arthritis ([facility] [address] and Seizures ([facility] [address] Social History:   reports that she has never smoked. She has never used smokeless tobacco. She reports that she does not drink alcohol and does not use drugs. Family History:   Family History   Family history unknown: Yes       Surgical History:   Past Surgical History:   Procedure Laterality Date    HIP FRACTURE SURGERY Right 11/20/14    LEG SURGERY Right 07/18/2018    RIGHT TIBIA IM MARÍA -KAET KNEE TRIANGLES, INTRAMEDULLARY REAMERS, SYNTHES TIBIAL NAIL    [place] TREAT TIBIAL SHAFT FX, INTRAMED IMPLANT Right 7/18/2018    RIGHT TIBIA IM MARÍA -KATE KNEE TRIANGLES, INTRAMEDULLARY REAMERS, SYNTHES TIBIAL NAIL performed by Tavares Castillo DO at [address] [place] [address]        Physical Examination      Vitals:  /60   Pulse 133   SpO2 (!) 72%     Physical Exam  Vitals and nursing note reviewed. Constitutional:       General: She is not in acute distress. Appearance: Normal appearance. She is obese. She is not ill-appearing, toxic-appearing or diaphoretic. HENT:      Head: Normocephalic and atraumatic. Eyes:      General: No scleral icterus. Right eye: No discharge. Left eye: No discharge. Extraocular Movements: Extraocular movements intact. Conjunctiva/sclera: Conjunctivae normal.   Cardiovascular:      Rate and Rhythm: Normal rate and regular rhythm. Pulses: Normal pulses. Heart sounds: Normal heart sounds. No murmur heard. No friction rub. No gallop. Pulmonary:      Effort: Pulmonary effort is normal. No respiratory distress. Breath sounds: Normal breath sounds. No stridor. No wheezing, rhonchi or rales. Chest:      Chest wall: No tenderness. Musculoskeletal:      Right lower leg: Edema present. Left lower leg: Edema present. Neurological:      Mental Status: She is alert and oriented to person, place, and time. Mental status is at baseline.    Psychiatric:         Attention and Perception: She is inattentive. Mood and Affect: Mood normal. Affect is blunt and flat. Speech: Speech is delayed. Behavior: Behavior is slowed. Thought Content: Thought content normal.         Judgment: Judgment normal.         Labs/Imaging/Diagnostics   Labs:  No results found for: CMPWITHGFR, CBCAUTODIF, TSHFT4, LABA1C, LIPIDPAN    Imaging Last 24 Hours:  CT KNEE RIGHT WO CONTRAST  Narrative: EXAMINATION:  CT OF THE RIGHT KNEE WITHOUT CONTRAST 9/23/2020 3:49 pm    TECHNIQUE:  CT of the right knee was performed without the administration of intravenous  contrast.  Multiplanar reformatted images are provided for review. Dose  modulation, iterative reconstruction, and/or weight based adjustment of the  mA/kV was utilized to reduce the radiation dose to as low as reasonably  achievable. COMPARISON:  Right knee radiograph obtained on the same day. Right tibia/fibula  radiograph dated 07/18/2018. HISTORY  ORDERING SYSTEM PROVIDED HISTORY: fall  TECHNOLOGIST PROVIDED HISTORY:  fall  Reason for Exam: Right knee injury, fall,  best images possible  Acuity: Acute  Type of Exam: Initial    FINDINGS:  Bones: The bones appear demineralized. There is subtle cortical irregularity  and slight depression of the central weight-bearing portion of the lateral  tibial plateau (series 2 image 45) likely reflecting minimally  displaced/depressed lateral tibial plateau fracture. There is approximately  3 mm depression of the articular surface. There is marginal osteophyte  formation seen along the outer weight-bearing portion of the medial tibial  plateau; however, the medial tibial plateau appears intact. Again seen is an  intramedullary ankit and multiple screws affixing the right tibia as seen on  the prior study.   The proximal portion of the intramedullary ankit is seen  along the anteromedial cortex of the proximal tibial diaphysis; however,  these appear to be grossly similar to the prior tibia fibular radiograph  dated 07/18/2018 accounting for differences in technique. Soft Tissue: There is diffuse subcutaneous edema and increased reticulation  without drainable fluid collection seen. There is moderate diffuse atrophy  of the musculature of the knee. Again seen are foci of heterotrophic  ossification seen within the posteromedial soft tissues of the calf. Joint:  Mild to moderate osteoarthritis affects the right knee with  tricompartmental osteophytes. As seen on the radiograph obtained the same  day, there is a moderate-sized lipohemarthrosis, likely due to underlying  lateral tibial plateau fracture. Impression: Subtle cortical irregularity and 3 mm depression of the central  weight-bearing portion of the lateral tibial plateau, reflecting minimally  displaced/depressed lateral tibial plateau fracture. The medial tibial  plateau appears grossly intact. Changes of prior orthopedic fixation of the right tibia partially visualized. The proximal aspect of the intramedullary ankit is seen along the cortex of the  anterior tibia; however, similar findings were seen on the tibia fibula  radiograph dated 07/18/2018 and is of unknown clinical significance. The bones appear demineralized. XR KNEE RIGHT (3 VIEWS)  Narrative: EXAMINATION:  THREE XRAY VIEWS OF THE RIGHT KNEE    9/23/2020 2:53 pm    COMPARISON:  07/18/2018    HISTORY:  ORDERING SYSTEM PROVIDED HISTORY: Pain  TECHNOLOGIST PROVIDED HISTORY:  Pain  Reason for Exam: Pt c/o pain to right knee, s/p fall x 1 day ago. Acuity: Acute  Type of Exam: Subsequent/Follow-up    FINDINGS:  No evidence of acute fracture or dislocation. No focal osseous lesion. Possible lipohemarthrosis. No focal soft tissue abnormality. Marked  osteopenia. Partially imaged ORIF of the tibia. Impression: Possible lipohemarthrosis. Given the marked osteopenia, nondisplaced  fracture is difficult to exclude.   Consider further characterization with  cross-sectional imaging. On the basis of positive falls risk screening, assessment and plan is as follows: home safety tips provided.

## 2021-07-09 NOTE — ASSESSMENT & PLAN NOTE
Monitored by specialist- no acute findings meriting change in the plan   Having lethargy - advised to f/u with neuro about seizure medication adjustment

## 2021-07-09 NOTE — PATIENT INSTRUCTIONS
Patient Education        Preventing Falls: Care Instructions  Your Care Instructions     Getting around your home safely can be a challenge if you have injuries or health problems that make it easy for you to fall. Loose rugs and furniture in walkways are among the dangers for many older people who have problems walking or who have poor eyesight. People who have conditions such as arthritis, osteoporosis, or dementia also have to be careful not to fall. You can make your home safer with a few simple measures. Follow-up care is a key part of your treatment and safety. Be sure to make and go to all appointments, and call your doctor if you are having problems. It's also a good idea to know your test results and keep a list of the medicines you take. How can you care for yourself at home? Taking care of yourself  · You may get dizzy if you do not drink enough water. To prevent dehydration, drink plenty of fluids. Choose water and other caffeine-free clear liquids. If you have kidney, heart, or liver disease and have to limit fluids, talk with your doctor before you increase the amount of fluids you drink. · Exercise regularly to improve your strength, muscle tone, and balance. Walk if you can. Swimming may be a good choice if you cannot walk easily. · Have your vision and hearing checked each year or any time you notice a change. If you have trouble seeing and hearing, you might not be able to avoid objects and could lose your balance. · Know the side effects of the medicines you take. Ask your doctor or pharmacist whether the medicines you take can affect your balance. Sleeping pills or sedatives can affect your balance. · Limit the amount of alcohol you drink. Alcohol can impair your balance and other senses. · Ask your doctor whether calluses or corns on your feet need to be removed. If you wear loose-fitting shoes because of calluses or corns, you can lose your balance and fall.   · Talk to your doctor if you have numbness in your feet. Preventing falls at home  · Remove raised doorway thresholds, throw rugs, and clutter. Repair loose carpet or raised areas in the floor. · Move furniture and electrical cords to keep them out of walking paths. · Use nonskid floor wax, and wipe up spills right away, especially on ceramic tile floors. · If you use a walker or cane, put rubber tips on it. If you use crutches, clean the bottoms of them regularly with an abrasive pad, such as steel wool. · Keep your house well lit, especially [place], and outside walkways. Use night-lights in areas such as hallways and bathrooms. Add extra light switches or use remote switches (such as switches that go on or off when you clap your hands) to make it easier to turn lights on if you have to get up during the night. · Install sturdy handrails on stairways. · Move items in your cabinets so that the things you use a lot are on the lower shelves (about waist level). · Keep a cordless phone and a flashlight with new batteries by your bed. If possible, put a phone in each of the main rooms of your house, or carry a cell phone in case you fall and cannot reach a phone. Or, you can wear a device around your neck or wrist. You push a button that sends a signal for help. · Wear low-heeled shoes that fit well and give your feet good support. Use footwear with nonskid soles. Check the heels and soles of your shoes for wear. Repair or replace worn heels or soles. · Do not wear socks without shoes on wood floors. · Walk on the grass when the sidewalks are slippery. If you live in an area that gets snow and ice in the winter, sprinkle salt on slippery steps and sidewalks. Preventing falls in the bath  · Install grab bars and nonskid mats inside and outside your shower or tub and near the toilet and sinks. · Use shower chairs and bath benches. · Use a hand-held shower head that will allow you to sit while showering.   · Get into a tub or shower by putting the weaker leg in first. Get out of a tub or shower with your strong side first.  · Repair loose toilet seats and consider installing a raised toilet seat to make getting on and off the toilet easier. · Keep your bathroom door unlocked while you are in the shower. Where can you learn more? Go to [email or web address] and sign in to your [employer] account. Enter [phone] in the [facility] box to learn more about \"Preventing Falls: Care Instructions. \"     If you do not have an account, please click on the \"Sign Up Now\" link. Current as of: December 7, 2020               Content Version: 12.9  © 8237-4654 [employer]. Care instructions adapted under license by [facility] ([facility]). If you have questions about a medical condition or this instruction, always ask your healthcare professional. [place] [address] any warranty or liability for your use of this information.

## 2021-07-19 ENCOUNTER — HOSPITAL ENCOUNTER (OUTPATIENT)
Dept: MAMMOGRAPHY | Facility: CLINIC | Age: 76
Discharge: HOME | End: 2021-07-21
Payer: MEDICAID

## 2021-07-19 ENCOUNTER — HOSPITAL ENCOUNTER (OUTPATIENT)
Age: 76
Setting detail: SPECIMEN
Discharge: HOME | End: 2021-07-19
Payer: MEDICAID

## 2021-07-19 DIAGNOSIS — Z12.31 ENCOUNTER FOR SCREENING MAMMOGRAM FOR MALIGNANT NEOPLASM OF BREAST: ICD-10-CM

## 2021-07-19 DIAGNOSIS — Z13.220 LIPID SCREENING: ICD-10-CM

## 2021-07-19 DIAGNOSIS — M80.00XD AGE-RELATED OSTEOPOROSIS WITH CURRENT PATHOLOGICAL FRACTURE WITH ROUTINE HEALING: ICD-10-CM

## 2021-07-19 DIAGNOSIS — R60.0 BILATERAL LOWER EXTREMITY EDEMA: ICD-10-CM

## 2021-07-19 DIAGNOSIS — G40.909 SEIZURE DISORDER (HCC): Chronic | ICD-10-CM

## 2021-07-19 DIAGNOSIS — I10 ESSENTIAL (PRIMARY) HYPERTENSION: ICD-10-CM

## 2021-07-19 LAB
ABSOLUTE EOS #: 0.08 K/UL (ref 0–0.44)
ABSOLUTE IMMATURE GRANULOCYTE: 0.04 K/UL (ref 0–0.3)
ABSOLUTE LYMPH #: 1.37 K/UL (ref 1.1–3.7)
ABSOLUTE MONO #: 0.34 K/UL (ref 0.1–1.2)
ALBUMIN SERPL-MCNC: 3.8 G/DL (ref 3.5–5.2)
ALBUMIN/GLOBULIN RATIO: 1.5 (ref 1–2.5)
ALP BLD-CCNC: 121 U/L (ref 35–104)
ALT SERPL-CCNC: 8 U/L (ref 5–33)
ANION GAP SERPL CALCULATED.3IONS-SCNC: 15 MMOL/L (ref 9–17)
AST SERPL-CCNC: 14 U/L
BASOPHILS # BLD: 1 % (ref 0–2)
BASOPHILS ABSOLUTE: 0.03 K/UL (ref 0–0.2)
BILIRUB SERPL-MCNC: 0.26 MG/DL (ref 0.3–1.2)
BUN BLDV-MCNC: 23 MG/DL (ref 8–23)
BUN/CREAT BLD: ABNORMAL (ref 9–20)
CALCIUM SERPL-MCNC: 9.1 MG/DL (ref 8.6–10.4)
CHLORIDE BLD-SCNC: 104 MMOL/L (ref 98–107)
CHOLESTEROL/HDL RATIO: 3
CHOLESTEROL: 248 MG/DL
CO2: 22 MMOL/L (ref 20–31)
CREAT SERPL-MCNC: 0.77 MG/DL (ref 0.5–0.9)
DIFFERENTIAL TYPE: ABNORMAL
EOSINOPHILS RELATIVE PERCENT: 2 % (ref 1–4)
GFR AFRICAN AMERICAN: >60 ML/MIN
GFR NON-AFRICAN AMERICAN: >60 ML/MIN
GFR SERPL CREATININE-BSD FRML MDRD: ABNORMAL ML/MIN/{1.73_M2}
GFR SERPL CREATININE-BSD FRML MDRD: ABNORMAL ML/MIN/{1.73_M2}
GLUCOSE BLD-MCNC: 71 MG/DL (ref 70–99)
HCT VFR BLD CALC: 42 % (ref 36.3–47.1)
HDLC SERPL-MCNC: 82 MG/DL
HEMOGLOBIN: 12.9 G/DL (ref 11.9–15.1)
IMMATURE GRANULOCYTES: 1 %
LDL CHOLESTEROL: 142 MG/DL (ref 0–130)
LYMPHOCYTES # BLD: 41 % (ref 24–43)
MCH RBC QN AUTO: 32.5 PG (ref 25.2–33.5)
MCHC RBC AUTO-ENTMCNC: 30.7 G/DL (ref 28.4–34.8)
MCV RBC AUTO: 105.8 FL (ref 82.6–102.9)
MONOCYTES # BLD: 10 % (ref 3–12)
NRBC AUTOMATED: 0 PER 100 WBC
PDW BLD-RTO: 13.5 % (ref 11.8–14.4)
PLATELET # BLD: 178 K/UL (ref 138–453)
PLATELET ESTIMATE: ABNORMAL
PMV BLD AUTO: 12.1 FL (ref 8.1–13.5)
POTASSIUM SERPL-SCNC: 4.5 MMOL/L (ref 3.7–5.3)
RBC # BLD: 3.97 M/UL (ref 3.95–5.11)
RBC # BLD: ABNORMAL 10*6/UL
SEG NEUTROPHILS: 45 % (ref 36–65)
SEGMENTED NEUTROPHILS ABSOLUTE COUNT: 1.51 K/UL (ref 1.5–8.1)
SODIUM BLD-SCNC: 141 MMOL/L (ref 135–144)
TOTAL PROTEIN: 6.4 G/DL (ref 6.4–8.3)
TRIGL SERPL-MCNC: 121 MG/DL
VLDLC SERPL CALC-MCNC: ABNORMAL MG/DL (ref 1–30)
WBC # BLD: 3.4 K/UL (ref 3.5–11.3)
WBC # BLD: ABNORMAL 10*3/UL

## 2021-07-19 PROCEDURE — 77067 SCR MAMMO BI INCL CAD: CPT

## 2021-07-23 RX ORDER — ATORVASTATIN CALCIUM 40 MG/1
40 TABLET, FILM COATED ORAL DAILY
Qty: 90 TABLET | Refills: 1 | Status: ON HOLD | OUTPATIENT
Start: 2021-07-23 | End: 2021-08-01 | Stop reason: HOSPADM

## 2021-07-28 ENCOUNTER — APPOINTMENT (OUTPATIENT)
Dept: GENERAL RADIOLOGY | Age: 76
DRG: 308 | End: 2021-07-28
Payer: MEDICAID

## 2021-07-28 ENCOUNTER — APPOINTMENT (OUTPATIENT)
Dept: CT IMAGING | Age: 76
DRG: 308 | End: 2021-07-28
Payer: MEDICAID

## 2021-07-28 ENCOUNTER — HOSPITAL ENCOUNTER (INPATIENT)
Age: 76
LOS: 5 days | Discharge: NURSING, REHAB OR LONG-TERM CARE | DRG: 308 | End: 2021-08-02
Attending: EMERGENCY MEDICINE | Admitting: INTERNAL MEDICINE
Payer: MEDICAID

## 2021-07-28 DIAGNOSIS — S72.91XA CLOSED FRACTURE OF RIGHT FEMUR, UNSPECIFIED FRACTURE MORPHOLOGY, UNSPECIFIED PORTION OF FEMUR, INITIAL ENCOUNTER (HCC): Primary | ICD-10-CM

## 2021-07-28 PROBLEM — S72.491A CLOSED COMMINUTED INTRA-ARTICULAR FRACTURE OF DISTAL END OF RIGHT FEMUR (HCC): Status: ACTIVE | Noted: 2021-07-28

## 2021-07-28 LAB
ABSOLUTE EOS #: <0.03 K/UL (ref 0–0.44)
ABSOLUTE IMMATURE GRANULOCYTE: 0.06 K/UL (ref 0–0.3)
ABSOLUTE LYMPH #: 0.78 K/UL (ref 1.1–3.7)
ABSOLUTE MONO #: 0.59 K/UL (ref 0.1–1.2)
ANION GAP SERPL CALCULATED.3IONS-SCNC: 10 MMOL/L (ref 9–17)
BASOPHILS # BLD: 0 % (ref 0–2)
BASOPHILS ABSOLUTE: 0.03 K/UL (ref 0–0.2)
BUN BLDV-MCNC: 32 MG/DL (ref 8–23)
BUN/CREAT BLD: 46 (ref 9–20)
CALCIUM SERPL-MCNC: 9 MG/DL (ref 8.6–10.4)
CHLORIDE BLD-SCNC: 109 MMOL/L (ref 98–107)
CO2: 26 MMOL/L (ref 20–31)
CREAT SERPL-MCNC: 0.69 MG/DL (ref 0.5–0.9)
DIFFERENTIAL TYPE: ABNORMAL
EOSINOPHILS RELATIVE PERCENT: 0 % (ref 1–4)
GFR AFRICAN AMERICAN: >60 ML/MIN
GFR NON-AFRICAN AMERICAN: >60 ML/MIN
GFR SERPL CREATININE-BSD FRML MDRD: ABNORMAL ML/MIN/{1.73_M2}
GFR SERPL CREATININE-BSD FRML MDRD: ABNORMAL ML/MIN/{1.73_M2}
GLUCOSE BLD-MCNC: 110 MG/DL (ref 70–99)
HCT VFR BLD CALC: 34.9 % (ref 36.3–47.1)
HEMOGLOBIN: 10.8 G/DL (ref 11.9–15.1)
IMMATURE GRANULOCYTES: 1 %
INR BLD: 1.1
LYMPHOCYTES # BLD: 10 % (ref 24–43)
MCH RBC QN AUTO: 32.4 PG (ref 25.2–33.5)
MCHC RBC AUTO-ENTMCNC: 30.9 G/DL (ref 28.4–34.8)
MCV RBC AUTO: 104.8 FL (ref 82.6–102.9)
MONOCYTES # BLD: 7 % (ref 3–12)
NRBC AUTOMATED: 0 PER 100 WBC
PARTIAL THROMBOPLASTIN TIME: 38.3 SEC (ref 23.9–33.8)
PDW BLD-RTO: 13.2 % (ref 11.8–14.4)
PLATELET # BLD: 163 K/UL (ref 138–453)
PLATELET ESTIMATE: ABNORMAL
PMV BLD AUTO: 11.4 FL (ref 8.1–13.5)
POTASSIUM SERPL-SCNC: 4.6 MMOL/L (ref 3.7–5.3)
PROTHROMBIN TIME: 13.9 SEC (ref 11.5–14.2)
RBC # BLD: 3.33 M/UL (ref 3.95–5.11)
RBC # BLD: ABNORMAL 10*6/UL
SARS-COV-2, RAPID: NOT DETECTED
SEG NEUTROPHILS: 82 % (ref 36–65)
SEGMENTED NEUTROPHILS ABSOLUTE COUNT: 6.49 K/UL (ref 1.5–8.1)
SODIUM BLD-SCNC: 145 MMOL/L (ref 135–144)
SPECIMEN DESCRIPTION: NORMAL
WBC # BLD: 8 K/UL (ref 3.5–11.3)
WBC # BLD: ABNORMAL 10*3/UL

## 2021-07-28 PROCEDURE — 70450 CT HEAD/BRAIN W/O DYE: CPT

## 2021-07-28 PROCEDURE — 73110 X-RAY EXAM OF WRIST: CPT

## 2021-07-28 PROCEDURE — 85610 PROTHROMBIN TIME: CPT

## 2021-07-28 PROCEDURE — 99222 1ST HOSP IP/OBS MODERATE 55: CPT | Performed by: NURSE PRACTITIONER

## 2021-07-28 PROCEDURE — 1200000000 HC SEMI PRIVATE

## 2021-07-28 PROCEDURE — 6370000000 HC RX 637 (ALT 250 FOR IP): Performed by: NURSE PRACTITIONER

## 2021-07-28 PROCEDURE — 87635 SARS-COV-2 COVID-19 AMP PRB: CPT

## 2021-07-28 PROCEDURE — 73130 X-RAY EXAM OF HAND: CPT

## 2021-07-28 PROCEDURE — 80048 BASIC METABOLIC PNL TOTAL CA: CPT

## 2021-07-28 PROCEDURE — 73552 X-RAY EXAM OF FEMUR 2/>: CPT

## 2021-07-28 PROCEDURE — 99282 EMERGENCY DEPT VISIT SF MDM: CPT

## 2021-07-28 PROCEDURE — 2580000003 HC RX 258: Performed by: NURSE PRACTITIONER

## 2021-07-28 PROCEDURE — 6370000000 HC RX 637 (ALT 250 FOR IP): Performed by: INTERNAL MEDICINE

## 2021-07-28 PROCEDURE — 85025 COMPLETE CBC W/AUTO DIFF WBC: CPT

## 2021-07-28 PROCEDURE — 73700 CT LOWER EXTREMITY W/O DYE: CPT

## 2021-07-28 PROCEDURE — 73562 X-RAY EXAM OF KNEE 3: CPT

## 2021-07-28 PROCEDURE — 85730 THROMBOPLASTIN TIME PARTIAL: CPT

## 2021-07-28 PROCEDURE — 73590 X-RAY EXAM OF LOWER LEG: CPT

## 2021-07-28 RX ORDER — POLYETHYLENE GLYCOL 3350 17 G/17G
17 POWDER, FOR SOLUTION ORAL DAILY PRN
Status: DISCONTINUED | OUTPATIENT
Start: 2021-07-28 | End: 2021-08-02 | Stop reason: HOSPADM

## 2021-07-28 RX ORDER — DIVALPROEX SODIUM 250 MG/1
250 TABLET, DELAYED RELEASE ORAL 2 TIMES DAILY
Status: ON HOLD | COMMUNITY
End: 2021-08-01 | Stop reason: HOSPADM

## 2021-07-28 RX ORDER — CARBAMAZEPINE 200 MG/1
800 CAPSULE, EXTENDED RELEASE ORAL 2 TIMES DAILY
Status: DISCONTINUED | OUTPATIENT
Start: 2021-07-28 | End: 2021-08-02 | Stop reason: HOSPADM

## 2021-07-28 RX ORDER — SODIUM CHLORIDE 9 MG/ML
25 INJECTION, SOLUTION INTRAVENOUS PRN
Status: DISCONTINUED | OUTPATIENT
Start: 2021-07-28 | End: 2021-08-02 | Stop reason: HOSPADM

## 2021-07-28 RX ORDER — SODIUM CHLORIDE 0.9 % (FLUSH) 0.9 %
5-40 SYRINGE (ML) INJECTION PRN
Status: DISCONTINUED | OUTPATIENT
Start: 2021-07-28 | End: 2021-08-02 | Stop reason: HOSPADM

## 2021-07-28 RX ORDER — ONDANSETRON 2 MG/ML
4 INJECTION INTRAMUSCULAR; INTRAVENOUS EVERY 6 HOURS PRN
Status: DISCONTINUED | OUTPATIENT
Start: 2021-07-28 | End: 2021-08-02 | Stop reason: HOSPADM

## 2021-07-28 RX ORDER — SODIUM CHLORIDE 0.9 % (FLUSH) 0.9 %
5-40 SYRINGE (ML) INJECTION EVERY 12 HOURS SCHEDULED
Status: DISCONTINUED | OUTPATIENT
Start: 2021-07-28 | End: 2021-08-02 | Stop reason: HOSPADM

## 2021-07-28 RX ORDER — HYDROCODONE BITARTRATE AND ACETAMINOPHEN 5; 325 MG/1; MG/1
1 TABLET ORAL EVERY 4 HOURS PRN
Status: DISCONTINUED | OUTPATIENT
Start: 2021-07-28 | End: 2021-08-02 | Stop reason: HOSPADM

## 2021-07-28 RX ORDER — BUMETANIDE 1 MG/1
1 TABLET ORAL DAILY
Status: DISCONTINUED | OUTPATIENT
Start: 2021-07-29 | End: 2021-08-02 | Stop reason: HOSPADM

## 2021-07-28 RX ORDER — HYDROCODONE BITARTRATE AND ACETAMINOPHEN 5; 325 MG/1; MG/1
2 TABLET ORAL EVERY 4 HOURS PRN
Status: DISCONTINUED | OUTPATIENT
Start: 2021-07-28 | End: 2021-08-02 | Stop reason: HOSPADM

## 2021-07-28 RX ORDER — DIVALPROEX SODIUM 500 MG/1
1000 TABLET, DELAYED RELEASE ORAL 2 TIMES DAILY
Status: DISCONTINUED | OUTPATIENT
Start: 2021-07-28 | End: 2021-07-28

## 2021-07-28 RX ORDER — ATORVASTATIN CALCIUM 40 MG/1
40 TABLET, FILM COATED ORAL NIGHTLY
Status: DISCONTINUED | OUTPATIENT
Start: 2021-07-29 | End: 2021-08-02 | Stop reason: HOSPADM

## 2021-07-28 RX ORDER — MORPHINE SULFATE 2 MG/ML
2 INJECTION, SOLUTION INTRAMUSCULAR; INTRAVENOUS
Status: DISCONTINUED | OUTPATIENT
Start: 2021-07-28 | End: 2021-08-02 | Stop reason: HOSPADM

## 2021-07-28 RX ORDER — CARBAMAZEPINE 100 MG/1
800 TABLET, EXTENDED RELEASE ORAL 2 TIMES DAILY
Status: DISCONTINUED | OUTPATIENT
Start: 2021-07-28 | End: 2021-07-28

## 2021-07-28 RX ORDER — ONDANSETRON 4 MG/1
4 TABLET, ORALLY DISINTEGRATING ORAL EVERY 8 HOURS PRN
Status: DISCONTINUED | OUTPATIENT
Start: 2021-07-28 | End: 2021-08-02 | Stop reason: HOSPADM

## 2021-07-28 RX ORDER — ACETAMINOPHEN 325 MG/1
650 TABLET ORAL EVERY 4 HOURS PRN
Status: DISCONTINUED | OUTPATIENT
Start: 2021-07-28 | End: 2021-08-02 | Stop reason: HOSPADM

## 2021-07-28 RX ORDER — DIVALPROEX SODIUM 250 MG/1
250 TABLET, DELAYED RELEASE ORAL 2 TIMES DAILY
Status: DISCONTINUED | OUTPATIENT
Start: 2021-07-28 | End: 2021-07-28

## 2021-07-28 RX ORDER — DOCUSATE SODIUM 100 MG/1
100 CAPSULE, LIQUID FILLED ORAL 2 TIMES DAILY PRN
Status: DISCONTINUED | OUTPATIENT
Start: 2021-07-28 | End: 2021-08-02 | Stop reason: HOSPADM

## 2021-07-28 RX ORDER — DIVALPROEX SODIUM 500 MG/1
1000 TABLET, DELAYED RELEASE ORAL 2 TIMES DAILY
Status: ON HOLD | COMMUNITY
End: 2021-08-01 | Stop reason: HOSPADM

## 2021-07-28 RX ADMIN — CARBAMAZEPINE 800 MG: 200 CAPSULE, EXTENDED RELEASE ORAL at 21:28

## 2021-07-28 RX ADMIN — DIVALPROEX SODIUM 1250 MG: 500 TABLET, DELAYED RELEASE ORAL at 21:26

## 2021-07-28 RX ADMIN — HYDROCODONE BITARTRATE AND ACETAMINOPHEN 1 TABLET: 5; 325 TABLET ORAL at 21:26

## 2021-07-28 RX ADMIN — SODIUM CHLORIDE, PRESERVATIVE FREE 10 ML: 5 INJECTION INTRAVENOUS at 21:28

## 2021-07-28 NOTE — H&P
however she is unable to provide any meaningful history. she points to her knee and states it hurts. CT head unremarkable, left handed wrist x-rays unremarkable for fracture. X-ray of her right knee revealed a comminuted distal femur fracture. Ortho was consulted from ER who recommended a CT of her knee and plans to take her to the OR for repair tomorrow. Past Medical History:     Past Medical History:   Diagnosis Date    Anxiety     Arthritis     Cataracts, bilateral     Essential (primary) hypertension 3/6/2019    Fracture (healed) treatment follow-up 2015    hand fx post fall    Mental retardation     Movement disorder     Primary osteoarthritis involving multiple joints 2/17/2017    Rheumatoid arthritis ([address]     Seizures (HCC)         Past Surgical History:     Past Surgical History:   Procedure Laterality Date    HIP FRACTURE SURGERY Right 11/20/14    LEG SURGERY Right 07/18/2018    RIGHT TIBIA IM MARÍA -KATE KNEE TRIANGLES, INTRAMEDULLARY REAMERS, SYNTHES TIBIAL NAIL    IN TREAT TIBIAL SHAFT FX, INTRAMED IMPLANT Right 7/18/2018    RIGHT TIBIA IM MARÍA -KATE KNEE TRIANGLES, INTRAMEDULLARY REAMERS, SYNTHES TIBIAL NAIL performed by Natalie Klein DO at [address] [place] [address]        Medications Prior to Admission:     Prior to Admission medications    Medication Sig Start Date End Date Taking?  Authorizing Provider   divalproex (DEPAKOTE) 250 MG DR tablet Take 250 mg by mouth 2 times daily Takes 250mg + 500mg tabs for total 1250mg BID dose   Yes Historical Provider, MD   divalproex (DEPAKOTE) 500 MG DR tablet Take 1,000 mg by mouth 2 times daily Takes 500mg tabs + 250mg tabs for 1250mg BID dose   Yes Historical Provider, MD   atorvastatin (LIPITOR) 40 MG tablet Take 1 tablet by mouth daily  Patient taking differently: Take 40 mg by mouth nightly  7/23/21  Yes Errol Clark DO   bumetanide (BUMEX) 1 MG tablet TAKE 1 TABLET BY MOUTH DAILY 7/7/21  Yes Errol Clark DO   naproxen (NAPROSYN) 500 MG tablet TAKE 1 TABLET BY MOUTH TWICE A DAY WITH MEALS 21 Yes Mita Polk DO   STOOL SOFTENER 100 MG capsule TAKE 1 CAPSULE BY MOUTH TWICE A DAY AS NEEDED FOR CONSTIPATION 9/3/20  Yes Sade Cuadra MD   carBAMazepine (CARBATROL) 200 MG extended release capsule TAKE 4 CAPSULES BY MOUTH TWICE A DAY. Give second dose at bedtime. 20  Yes Newton Bolaños MD   alendronate (FOSAMAX) 70 MG tablet Take 1 tablet by mouth every 7 days  Patient taking differently: Take 70 mg by mouth every 7 days Indications: Sundays  8/31/20  Yes Sade Cuadra MD        Allergies:     Patient has no known allergies. Social History:     Tobacco:    reports that she has never smoked. She has never used smokeless tobacco.  Alcohol:      reports no history of alcohol use. Drug Use:  reports no history of drug use. Family History:     Family History   Family history unknown: Yes       Review of Systems:     Positive and Negative as described in HPI. Limited due to developmental delay  + for right knee pain   + for bilateral lower extremity edema   Physical Exam:   BP (!) 142/69   Pulse 85   Temp 98 °F (36.7 °C) (Oral)   Resp 18   SpO2 100%   Temp (24hrs), Av °F (36.7 °C), Min:98 °F (36.7 °C), Max:98 °F (36.7 °C)    No results for input(s): POCGLU in the last 72 hours. No intake or output data in the 24 hours ending 21 1712    General Appearance:  alert, pleasant and cooperative, and in no acute distress  Mental status: oriented to self, developmental delay   Head:  normocephalic, atraumatic  Eye: no icterus, redness, pupils equal and reactive, extraocular eye movements intact, conjunctiva clear.  Ecchymosis noted to left cheek   Ear: normal external ear, no discharge, hearing intact  Nose:  no drainage noted  Mouth: mucous membranes moist  Neck: supple, no carotid bruits, thyroid not palpable  Lungs: Bilateral equal air entry, clear to auscultation, no wheezing, rales or rhonchi, normal effort  Cardiovascular: normal rate, regular rhythm, no murmur, gallop, rub. Abdomen: Soft, nontender, nondistended, normal bowel sounds, no hepatomegaly or splenomegaly  Neurologic: There are no new focal motor or sensory deficits, normal muscle tone and bulk, no abnormal sensation, normal speech, cranial nerves II through XII grossly intact  Skin: No gross lesions, rashes, bruising or bleeding on exposed skin area  Extremities:  peripheral pulses palpable, +2 bilateral lower extremity edema (chronic), bony tenderness to right knee with limited range of motion due to pain. Patient is chronically wheelchair dependent and has difficulty ambulating. Right leg is shortened since birth  Psych: normal affect, MRDD     Investigations:      Laboratory Testing:  Recent Results (from the past 24 hour(s))   COVID-19, Rapid    Collection Time: 07/28/21  4:00 PM    Specimen: Nasopharyngeal Swab   Result Value Ref Range    Specimen Description . NASOPHARYNGEAL SWAB     SARS-CoV-2, Rapid Not Detected Not Detected   CBC Auto Differential    Collection Time: 07/28/21  4:00 PM   Result Value Ref Range    WBC 8.0 3.5 - 11.3 k/uL    RBC 3.33 (L) 3.95 - 5.11 m/uL    Hemoglobin 10.8 (L) 11.9 - 15.1 g/dL    Hematocrit 34.9 (L) 36.3 - 47.1 %    .8 (H) 82.6 - 102.9 fL    MCH 32.4 25.2 - 33.5 pg    MCHC 30.9 28.4 - 34.8 g/dL    RDW 13.2 11.8 - 14.4 %    Platelets 497 477 - 167 k/uL    MPV 11.4 8.1 - 13.5 fL    NRBC Automated 0.0 0.0 per 100 WBC    Differential Type NOT REPORTED     Seg Neutrophils 82 (H) 36 - 65 %    Lymphocytes 10 (L) 24 - 43 %    Monocytes 7 3 - 12 %    Eosinophils % 0 (L) 1 - 4 %    Basophils 0 0 - 2 %    Immature Granulocytes 1 (H) 0 %    Segs Absolute 6.49 1.50 - 8.10 k/uL    Absolute Lymph # 0.78 (L) 1.10 - 3.70 k/uL    Absolute Mono # 0.59 0.10 - 1.20 k/uL    Absolute Eos # <0.03 0.00 - 0.44 k/uL    Basophils Absolute 0.03 0.00 - 0.20 k/uL    Absolute Immature Granulocyte 0.06 0.00 - 0.30 k/uL    WBC Morphology NOT REPORTED     RBC Morphology MACROCYTOSIS PRESENT     Platelet Estimate NOT REPORTED    Basic Metabolic Panel    Collection Time: 07/28/21  4:00 PM   Result Value Ref Range    Glucose 110 (H) 70 - 99 mg/dL    BUN 32 (H) 8 - 23 mg/dL    CREATININE 0.69 0.50 - 0.90 mg/dL    Bun/Cre Ratio 46 (H) 9 - 20    Calcium 9.0 8.6 - 10.4 mg/dL    Sodium 145 (H) 135 - 144 mmol/L    Potassium 4.6 3.7 - 5.3 mmol/L    Chloride 109 (H) 98 - 107 mmol/L    CO2 26 20 - 31 mmol/L    Anion Gap 10 9 - 17 mmol/L    GFR Non-African American >60 >60 mL/min    GFR African American >60 >60 mL/min    GFR Comment          GFR Staging NOT REPORTED    Protime-INR    Collection Time: 07/28/21  4:00 PM   Result Value Ref Range    Protime 13.9 11.5 - 14.2 sec    INR 1.1    APTT    Collection Time: 07/28/21  4:00 PM   Result Value Ref Range    PTT 38.3 (H) 23.9 - 33.8 sec       Imaging/Diagnostics:  XR WRIST LEFT (MIN 3 VIEWS)    Result Date: 7/28/2021  Diffuse osteopenia without acute osseous or soft tissue abnormality. XR HAND LEFT (MIN 3 VIEWS)    Result Date: 7/28/2021  Diffuse degenerative joint disease without acute osseous or soft tissue abnormality. XR FEMUR RIGHT (MIN 2 VIEWS)    Result Date: 7/28/2021  Comminuted fracture of the distal femoral metaphysis with impaction. XR KNEE RIGHT (3 VIEWS)    Result Date: 7/28/2021  Comminuted impacted fracture of the distal femoral metaphysis. XR TIBIA FIBULA RIGHT (2 VIEWS)    Result Date: 7/28/2021  Comminuted fracture of the distal femoral metaphysis with impaction. Diffuse soft tissue swelling. CT HEAD WO CONTRAST    Result Date: 7/28/2021  No acute intracranial abnormality.  Stable compared to prior study       Assessment :      Hospital Problems         Last Modified POA    * (Principal) Closed comminuted intra-articular fracture of distal end of right femur ([address] 7/28/2021 Yes    Seizure disorder (Chronic) 7/28/2021 Yes    Mental retardation (Chronic) 7/28/2021 Yes Overview Signed 6/10/2019  8:50 AM by Jami Casanova     Replacing deprecated diagnoses         Anxiety (Chronic) 7/28/2021 Yes    Age-related osteoporosis with current pathological fracture with routine healing 7/28/2021 Yes    Dependence on wheelchair 7/28/2021 Yes    Essential (primary) hypertension 7/28/2021 Yes          Plan:     Patient status inpatient in the Med/Surge    1. Resume select home meds  2. Monitor labs, replace electrolytes as needed  3. Ortho evaluation  4. Pre-surgical Covid testing  5. Monitor control blood pressure  6. Telemetry monitoring  7. CT right knee  8. Pain control  9. Neurovascular checks  10. Nonweightbearing to right leg  11. DVT prophylaxis  12. General diet, n.p.o. after midnight for surgery  13. Sharon Pleas for surgery with intermediate surgical risk   14. Plan discussed with patient, family and staff    Consultations:   IP CONSULT TO ORTHOPEDIC SURGERY     Patient is admitted as inpatient status because of co-morbidities listed above, severity of signs and symptoms as outlined, requirement for current medical therapies and most importantly because of direct risk to patient if care not provided in a hospital setting. Expected length of stay > 48 hours.     YASHIRA ROBB NP  7/28/2021  5:12 PM    Copy sent to Dr. Gladys Zamorano DO

## 2021-07-28 NOTE — CARE COORDINATION
Case Management Initial Discharge Plan  Dean Howard,         Readmission Risk              Risk of Unplanned Readmission:  0             Met with:patient or caregiver to discuss discharge plans. Information verified: address, contacts, phone number, , insurance Yes  PCP: Eli Crouch DO    Date of last visit: 21    Insurance Provider: medicaid    Discharge Planning  Current Residence:   [facility]  Living Arrangements:   group home   Home has 1 stories/no stairs to climb, has ramp  Support Systems:    and caregiver  Current Services PTA:   no Supplier: none  Patient able to perform ADL's:Independent  DME used to aid ambulation prior to admission: wheelchair, walker, elevated toilet seat, walk in shower, grab bars  During admission: wheelchair    Potential Assistance Needed:   home care vs snf    Pharmacy: [address] [place] [address] [facility]   Potential Assistance Purchasing Medications:   no  Does patient want to participate in local refill/ meds to beds program?   no    Patient agreeable to home care: Yes  Freedom of choice provided:  yes      Type of Home Care Services:     Patient expects to be discharged to:   home vs snf    Prior SNF/Rehab Placement and Facility: yes, [facility]  Agreeable to SNF/Rehab: Yes  [place] of choice provided: yes   Evaluation: yes    Expected Discharge date:   21  Follow Up Appointment: Best Day/ Time:      Transportation provider: group home caregiver vs transport  Transportation arrangements needed for discharge: tbd    Discharge Plan:   Patient lives in group home with 24 hour caregiver assistance. Patient was using 2ww and wheelchair prior to admission. Patient does not have home care services. Patient is admitted for closed fracture of right femur. Ortho is consulted. Awaiting plan to determine discharge needs.         Electronically signed by JIMENA Zamora on 21 at 3:28 PM EDT

## 2021-07-28 NOTE — PROGRESS NOTES
Pt admitted to room 2009. Oriented to room, call light and bed mechanics. Side rails up x2. Call light within reach. Orders reviewed.

## 2021-07-28 NOTE — ED PROVIDER NOTES
EMERGENCY DEPARTMENT ENCOUNTER   ATTENDING ATTESTATION     Pt Name: Chucky Byers  MRN: [id number]  [place] 1945  Date of evaluation: 7/28/21   Chucky Byers is a 77 y.o. female with CC: Fall (right leg and left hand injury. )    MDM:   Patient is a 52-year-old female who presented to the emergency department secondary to fall with subsequent comminuted right femur fracture. Patient discussed with the medicine service as well as Ortho and will be admitted for further evaluation treatment. CRITICAL CARE:       EKG: All EKG's are interpreted by the Emergency Department Physician who either signs or Co-signs this chart in the absence of a cardiologist.      RADIOLOGY:All plain film, CT, MRI, and formal ultrasound images (except ED bedside ultrasound) are read by the radiologist, see reports below, unless otherwise noted in MDM or here. CT KNEE RIGHT WO CONTRAST   Preliminary Result   Comminuted mildly displaced fracture of the distal femoral metadiaphysis as   above. The bones appear demineralized. Small knee joint effusion. CT HEAD WO CONTRAST   Final Result   No acute intracranial abnormality. Stable compared to prior study         XR TIBIA FIBULA RIGHT (2 VIEWS)   Final Result   Comminuted fracture of the distal femoral metaphysis with impaction. Diffuse soft tissue swelling. XR KNEE RIGHT (3 VIEWS)   Final Result   Comminuted impacted fracture of the distal femoral metaphysis. XR HAND LEFT (MIN 3 VIEWS)   Final Result   Diffuse degenerative joint disease without acute osseous or soft tissue   abnormality. XR FEMUR RIGHT (MIN 2 VIEWS)   Final Result   Comminuted fracture of the distal femoral metaphysis with impaction. XR WRIST LEFT (MIN 3 VIEWS)   Final Result   Diffuse osteopenia without acute osseous or soft tissue abnormality. LABS: All lab results were reviewed by myself, and all abnormals are listed below.   Labs Reviewed   CBC WITH BUMETANIDE (BUMEX) 1 MG TABLET    TAKE 1 TABLET BY MOUTH DAILY    CARBAMAZEPINE (CARBATROL) 200 MG EXTENDED RELEASE CAPSULE    TAKE 4 CAPSULES BY MOUTH TWICE A DAY. Give second dose at bedtime. DIVALPROEX (DEPAKOTE) 250 MG DR TABLET    Take 250 mg by mouth 2 times daily Takes 250mg + 500mg for total 1250mg BID dose    DIVALPROEX (DEPAKOTE) 500 MG DR TABLET    Take 2 po bid. Total 1250 mg po bid    NAPROXEN (NAPROSYN) 500 MG TABLET    TAKE 1 TABLET BY MOUTH TWICE A DAY WITH MEALS    STOOL SOFTENER 100 MG CAPSULE    TAKE 1 CAPSULE BY MOUTH TWICE A DAY AS NEEDED FOR CONSTIPATION     ALLERGIES     has No Known Allergies. FAMILY HISTORY     has no family status information on file. SOCIAL HISTORY       Social History     Tobacco Use    Smoking status: Never Smoker    Smokeless tobacco: Never Used   Vaping Use    Vaping Use: Never used   Substance Use Topics    Alcohol use: No     Alcohol/week: 0.0 standard drinks    Drug use: No       I personally evaluated and examined the patient in conjunction with the APC and agree with the assessment, treatment plan, and disposition of the patient as recorded by the APC.    Steven Montiel MD  Attending Emergency Physician          Steven Montiel MD  08/37/30 7871

## 2021-07-28 NOTE — PROGRESS NOTES
Transitions of Care Pharmacy Service   Medication Review    The patient's list of current home medications has been reviewed. Her PCP office prescribes in [facility] Source(s) of information: [facility] home nurse, [address] [place] [address] Rd, [address] [facility]      Please feel free to call me with any questions about this encounter. Thank you. Dsetin Wren [facility]   Transitions of Care Pharmacy Service  Phone:  [phone]  Fax: [phone]      Electronically signed by Destin Wren [facility] on 7/28/2021 at 5:35 PM       Prior to Admission medications    Medication Sig       divalproex (DEPAKOTE) 250 MG DR tablet Take 250 mg by mouth 2 times daily Takes 250mg + 500mg tabs for total 1250mg BID dose       divalproex (DEPAKOTE) 500 MG DR tablet Take 1,000 mg by mouth 2 times daily Takes 500mg tabs + 250mg tabs for 1250mg BID dose       atorvastatin (LIPITOR) 40 MG tablet Take 1 tablet by mouth daily  Patient taking differently: Take 40 mg by mouth nightly        bumetanide (BUMEX) 1 MG tablet TAKE 1 TABLET BY MOUTH DAILY       naproxen (NAPROSYN) 500 MG tablet TAKE 1 TABLET BY MOUTH TWICE A DAY WITH MEALS       STOOL SOFTENER 100 MG capsule TAKE 1 CAPSULE BY MOUTH TWICE A DAY AS NEEDED FOR CONSTIPATION       carBAMazepine (CARBATROL) 200 MG extended release capsule TAKE 4 CAPSULES BY MOUTH TWICE A DAY. Give second dose at bedtime.        alendronate (FOSAMAX) 70 MG tablet Take 1 tablet by mouth every 7 days  Patient taking differently: Take 70 mg by mouth every 7 days Indications: Sundays

## 2021-07-28 NOTE — ED PROVIDER NOTES
[facility] MED SURG  eMERGENCY dEPARTMENTeNCOUnter      Pt Name: Ariana Mejia  MRN: [id number]  [place] 1945  Date ofevaluation: 7/28/2021  Provider: Sa[address]l Vigil Dr       Chief Complaint   Patient presents with    Fall     right leg and left hand injury. HISTORY OF PRESENT ILLNESS  (Location/Symptom, Timing/Onset, Context/Setting, Quality, Duration, Modifying Factors, Severity.)   Ariana Mejia is a 77 y.o. female who presents to the emergency department with right leg pain and left hand pain status post fall that occurred at home. Patient is a group home resident. She arrives with caretaker. Patient does typically ambulate around and uses a walker often according to staff. She had a fall today. Pain described as mild to moderate. Pain worse with movement relieved with rest.      Nursing Notes were reviewed. ALLERGIES     Patient has no known allergies.     CURRENT MEDICATIONS       Current Discharge Medication List      CONTINUE these medications which have NOT CHANGED    Details   !! divalproex (DEPAKOTE) 250 MG DR tablet Take 250 mg by mouth 2 times daily Takes 250mg + 500mg tabs for total 1250mg BID dose      !! divalproex (DEPAKOTE) 500 MG DR tablet Take 1,000 mg by mouth 2 times daily Takes 500mg tabs + 250mg tabs for 1250mg BID dose      atorvastatin (LIPITOR) 40 MG tablet Take 1 tablet by mouth daily  Qty: 90 tablet, Refills: 1      bumetanide (BUMEX) 1 MG tablet TAKE 1 TABLET BY MOUTH DAILY  Qty: 28 tablet, Refills: 1    Comments: Refill needed for next bubble pack thank you  Associated Diagnoses: Medication monitoring encounter      naproxen (NAPROSYN) 500 MG tablet TAKE 1 TABLET BY MOUTH TWICE A DAY WITH MEALS  Qty: 60 tablet, Refills: 5    Comments: Maximum Refills Reached  Associated Diagnoses: Primary osteoarthritis involving multiple joints      STOOL SOFTENER 100 MG capsule TAKE 1 CAPSULE BY MOUTH TWICE A DAY AS NEEDED FOR CONSTIPATION  Qty: 28 capsule, Refills: 3    Comments: Maximum Refills Reached      carBAMazepine (CARBATROL) 200 MG extended release capsule TAKE 4 CAPSULES BY MOUTH TWICE A DAY. Give second dose at bedtime. Qty: 720 capsule, Refills: 3    Comments: Maximum Refills Reached      alendronate (FOSAMAX) 70 MG tablet Take 1 tablet by mouth every 7 days  Qty: 4 tablet, Refills: 11       !! - Potential duplicate medications found. Please discuss with provider. PAST MEDICAL HISTORY         Diagnosis Date    Anxiety     Arthritis     Cataracts, bilateral     Essential (primary) hypertension 3/6/2019    Fracture (healed) treatment follow-up 2015    hand fx post fall    Mental retardation     Movement disorder     Primary osteoarthritis involving multiple joints 2/17/2017    [address] [address]     Seizures ([facility] [address]        SURGICAL HISTORY           Procedure Laterality Date    HIP FRACTURE SURGERY Right 11/20/14    LEG SURGERY Right 07/18/2018    RIGHT TIBIA IM MARÍA -KATE KNEE TRIANGLES, INTRAMEDULLARY REAMERS, SYNTHES TIBIAL NAIL    NC TREAT TIBIAL SHAFT FX, INTRAMED IMPLANT Right 7/18/2018    RIGHT TIBIA IM MARÍA -KATE KNEE TRIANGLES, INTRAMEDULLARY REAMERS, SYNTHES TIBIAL NAIL performed by Elsie Sosa DO at [place] [address]           Family history unknown: Yes     No family status information on file. SOCIAL HISTORY      reports that she has never smoked. She has never used smokeless tobacco. She reports that she does not drink alcohol and does not use drugs. REVIEW OFSYSTEMS    (2-9 systems for level 4, 10 or more for level 5)   Review of Systems    Except as noted above the remainder of the review of systems was reviewed and negative.      PHYSICAL EXAM    (up to 7 for level 4, 8 or more for level 5)     ED Triage Vitals [07/28/21 1242]   BP Temp Temp Source Pulse Resp SpO2 Height Weight   (!) 142/69 98 °F (36.7 °C) Oral 85 18 100 % -- --      Physical Exam  Constitutional:       Appearance: COVID-19, RAPID   PROTIME-INR   BASIC METABOLIC PANEL W/ REFLEX TO MG FOR LOW K   CBC WITH AUTO DIFFERENTIAL       All other labs were within normal range or not returned as of this dictation. EMERGENCY DEPARTMENT COURSE and DIFFERENTIAL DIAGNOSIS/MDM:   Vitals:    Vitals:    07/28/21 1700 07/28/21 1807 07/28/21 1815 07/28/21 1948   BP: 138/67 (!) 129/51  103/69   Pulse: 82 87  86   Resp: 18 16  16   Temp:  97.7 °F (36.5 °C)  98.3 °F (36.8 °C)   TempSrc:  Oral  Oral   SpO2: 99% 99%  100%   Weight:   134 lb (60.8 kg)    Height:   5' (1.524 m)        3:26 PM EDT  Case discussed with dr Gerson Mercedes and he recommends knee ct and says patient will go TO OR likely tomorrow. 3:36 PM EDT  Case discussed with    CONSULTS:  Kobe Ramirez:  Procedures        FINAL IMPRESSION      1. Closed fracture of right femur, unspecified fracture morphology, unspecified portion of femur, initial encounter [facility])          DISPOSITION/PLAN   DISPOSITION Admitted 07/28/2021 04:03:30 PM      PATIENTREFERRED TO:   No follow-up provider specified.     DISCHARGE MEDICATIONS:     Current Discharge Medication List              (Please note that portions of this note were completed with a voice recognition program.  Efforts were made to edit thedictations but occasionally words are mis-transcribed.)    ZELALEM Gutierrez PA-C  07/28/21 2020

## 2021-07-29 ENCOUNTER — APPOINTMENT (OUTPATIENT)
Dept: GENERAL RADIOLOGY | Age: 76
DRG: 308 | End: 2021-07-29
Payer: MEDICAID

## 2021-07-29 ENCOUNTER — ANESTHESIA (OUTPATIENT)
Dept: OPERATING ROOM | Age: 76
DRG: 308 | End: 2021-07-29
Payer: MEDICAID

## 2021-07-29 ENCOUNTER — ANESTHESIA EVENT (OUTPATIENT)
Dept: OPERATING ROOM | Age: 76
DRG: 308 | End: 2021-07-29
Payer: MEDICAID

## 2021-07-29 VITALS — DIASTOLIC BLOOD PRESSURE: 73 MMHG | TEMPERATURE: 82.9 F | SYSTOLIC BLOOD PRESSURE: 154 MMHG | OXYGEN SATURATION: 96 %

## 2021-07-29 LAB
ABSOLUTE EOS #: <0.03 K/UL (ref 0–0.44)
ABSOLUTE IMMATURE GRANULOCYTE: 0.03 K/UL (ref 0–0.3)
ABSOLUTE LYMPH #: 1.05 K/UL (ref 1.1–3.7)
ABSOLUTE MONO #: 0.5 K/UL (ref 0.1–1.2)
ANION GAP SERPL CALCULATED.3IONS-SCNC: 7 MMOL/L (ref 9–17)
BASOPHILS # BLD: 1 % (ref 0–2)
BASOPHILS ABSOLUTE: <0.03 K/UL (ref 0–0.2)
BUN BLDV-MCNC: 32 MG/DL (ref 8–23)
BUN/CREAT BLD: 52 (ref 9–20)
CALCIUM SERPL-MCNC: 8.1 MG/DL (ref 8.6–10.4)
CHLORIDE BLD-SCNC: 110 MMOL/L (ref 98–107)
CO2: 27 MMOL/L (ref 20–31)
CREAT SERPL-MCNC: 0.61 MG/DL (ref 0.5–0.9)
DIFFERENTIAL TYPE: ABNORMAL
EOSINOPHILS RELATIVE PERCENT: 0 % (ref 1–4)
GFR AFRICAN AMERICAN: >60 ML/MIN
GFR NON-AFRICAN AMERICAN: >60 ML/MIN
GFR SERPL CREATININE-BSD FRML MDRD: ABNORMAL ML/MIN/{1.73_M2}
GFR SERPL CREATININE-BSD FRML MDRD: ABNORMAL ML/MIN/{1.73_M2}
GLUCOSE BLD-MCNC: 94 MG/DL (ref 70–99)
HCT VFR BLD CALC: 24.5 % (ref 36.3–47.1)
HCT VFR BLD CALC: 24.7 % (ref 36.3–47.1)
HCT VFR BLD CALC: 34.4 % (ref 36.3–47.1)
HEMOGLOBIN: 10.8 G/DL (ref 11.9–15.1)
HEMOGLOBIN: 7.6 G/DL (ref 11.9–15.1)
HEMOGLOBIN: 7.8 G/DL (ref 11.9–15.1)
IMMATURE GRANULOCYTES: 1 %
LYMPHOCYTES # BLD: 24 % (ref 24–43)
MCH RBC QN AUTO: 32.8 PG (ref 25.2–33.5)
MCHC RBC AUTO-ENTMCNC: 31 G/DL (ref 28.4–34.8)
MCV RBC AUTO: 105.6 FL (ref 82.6–102.9)
MONOCYTES # BLD: 12 % (ref 3–12)
NRBC AUTOMATED: 0 PER 100 WBC
PDW BLD-RTO: 13.2 % (ref 11.8–14.4)
PLATELET # BLD: 103 K/UL (ref 138–453)
PLATELET ESTIMATE: ABNORMAL
PMV BLD AUTO: 11 FL (ref 8.1–13.5)
POTASSIUM SERPL-SCNC: 4.1 MMOL/L (ref 3.7–5.3)
RBC # BLD: 2.32 M/UL (ref 3.95–5.11)
RBC # BLD: ABNORMAL 10*6/UL
SEG NEUTROPHILS: 63 % (ref 36–65)
SEGMENTED NEUTROPHILS ABSOLUTE COUNT: 2.73 K/UL (ref 1.5–8.1)
SODIUM BLD-SCNC: 144 MMOL/L (ref 135–144)
WBC # BLD: 4.3 K/UL (ref 3.5–11.3)
WBC # BLD: ABNORMAL 10*3/UL

## 2021-07-29 PROCEDURE — 6360000002 HC RX W HCPCS: Performed by: ORTHOPAEDIC SURGERY

## 2021-07-29 PROCEDURE — C1769 GUIDE WIRE: HCPCS | Performed by: ORTHOPAEDIC SURGERY

## 2021-07-29 PROCEDURE — P9016 RBC LEUKOCYTES REDUCED: HCPCS

## 2021-07-29 PROCEDURE — 6370000000 HC RX 637 (ALT 250 FOR IP): Performed by: ORTHOPAEDIC SURGERY

## 2021-07-29 PROCEDURE — 0QSB04Z REPOSITION RIGHT LOWER FEMUR WITH INTERNAL FIXATION DEVICE, OPEN APPROACH: ICD-10-PCS | Performed by: ORTHOPAEDIC SURGERY

## 2021-07-29 PROCEDURE — 85025 COMPLETE CBC W/AUTO DIFF WBC: CPT

## 2021-07-29 PROCEDURE — 36415 COLL VENOUS BLD VENIPUNCTURE: CPT

## 2021-07-29 PROCEDURE — 2580000003 HC RX 258: Performed by: ORTHOPAEDIC SURGERY

## 2021-07-29 PROCEDURE — 2580000003 HC RX 258: Performed by: NURSE ANESTHETIST, CERTIFIED REGISTERED

## 2021-07-29 PROCEDURE — 2709999900 HC NON-CHARGEABLE SUPPLY: Performed by: ORTHOPAEDIC SURGERY

## 2021-07-29 PROCEDURE — 7100000000 HC PACU RECOVERY - FIRST 15 MIN: Performed by: ORTHOPAEDIC SURGERY

## 2021-07-29 PROCEDURE — 73562 X-RAY EXAM OF KNEE 3: CPT

## 2021-07-29 PROCEDURE — C1713 ANCHOR/SCREW BN/BN,TIS/BN: HCPCS | Performed by: ORTHOPAEDIC SURGERY

## 2021-07-29 PROCEDURE — 3600000002 HC SURGERY LEVEL 2 BASE: Performed by: ORTHOPAEDIC SURGERY

## 2021-07-29 PROCEDURE — APPSS30 APP SPLIT SHARED TIME 16-30 MINUTES: Performed by: NURSE PRACTITIONER

## 2021-07-29 PROCEDURE — 86900 BLOOD TYPING SEROLOGIC ABO: CPT

## 2021-07-29 PROCEDURE — 99251 PR INITL INPATIENT CONSULT NEW/ESTAB PT 20 MIN: CPT | Performed by: ORTHOPAEDIC SURGERY

## 2021-07-29 PROCEDURE — 6370000000 HC RX 637 (ALT 250 FOR IP): Performed by: NURSE PRACTITIONER

## 2021-07-29 PROCEDURE — 99233 SBSQ HOSP IP/OBS HIGH 50: CPT | Performed by: INTERNAL MEDICINE

## 2021-07-29 PROCEDURE — 3700000001 HC ADD 15 MINUTES (ANESTHESIA): Performed by: ORTHOPAEDIC SURGERY

## 2021-07-29 PROCEDURE — 6370000000 HC RX 637 (ALT 250 FOR IP): Performed by: INTERNAL MEDICINE

## 2021-07-29 PROCEDURE — 6360000002 HC RX W HCPCS: Performed by: ANESTHESIOLOGY

## 2021-07-29 PROCEDURE — 86901 BLOOD TYPING SEROLOGIC RH(D): CPT

## 2021-07-29 PROCEDURE — 3700000000 HC ANESTHESIA ATTENDED CARE: Performed by: ORTHOPAEDIC SURGERY

## 2021-07-29 PROCEDURE — 7100000001 HC PACU RECOVERY - ADDTL 15 MIN: Performed by: ORTHOPAEDIC SURGERY

## 2021-07-29 PROCEDURE — 2580000003 HC RX 258: Performed by: NURSE PRACTITIONER

## 2021-07-29 PROCEDURE — 86920 COMPATIBILITY TEST SPIN: CPT

## 2021-07-29 PROCEDURE — 2500000003 HC RX 250 WO HCPCS: Performed by: NURSE ANESTHETIST, CERTIFIED REGISTERED

## 2021-07-29 PROCEDURE — 64447 NJX AA&/STRD FEMORAL NRV IMG: CPT | Performed by: ANESTHESIOLOGY

## 2021-07-29 PROCEDURE — 93005 ELECTROCARDIOGRAM TRACING: CPT

## 2021-07-29 PROCEDURE — 2720000010 HC SURG SUPPLY STERILE: Performed by: ORTHOPAEDIC SURGERY

## 2021-07-29 PROCEDURE — 73552 X-RAY EXAM OF FEMUR 2/>: CPT

## 2021-07-29 PROCEDURE — 80048 BASIC METABOLIC PNL TOTAL CA: CPT

## 2021-07-29 PROCEDURE — 1200000000 HC SEMI PRIVATE

## 2021-07-29 PROCEDURE — 85018 HEMOGLOBIN: CPT

## 2021-07-29 PROCEDURE — 85014 HEMATOCRIT: CPT

## 2021-07-29 PROCEDURE — 27511 TREATMENT OF THIGH FRACTURE: CPT | Performed by: ORTHOPAEDIC SURGERY

## 2021-07-29 PROCEDURE — 3600000012 HC SURGERY LEVEL 2 ADDTL 15MIN: Performed by: ORTHOPAEDIC SURGERY

## 2021-07-29 PROCEDURE — 2500000003 HC RX 250 WO HCPCS: Performed by: ANESTHESIOLOGY

## 2021-07-29 PROCEDURE — 6360000002 HC RX W HCPCS: Performed by: NURSE PRACTITIONER

## 2021-07-29 PROCEDURE — 6360000002 HC RX W HCPCS: Performed by: NURSE ANESTHETIST, CERTIFIED REGISTERED

## 2021-07-29 PROCEDURE — 3209999900 FLUORO FOR SURGICAL PROCEDURES

## 2021-07-29 PROCEDURE — 86850 RBC ANTIBODY SCREEN: CPT

## 2021-07-29 DEVICE — SCREW BNE L18MM DIA5MM CNDYL S STL ST VAR ANG LOK COMPR T25: Type: IMPLANTABLE DEVICE | Status: FUNCTIONAL

## 2021-07-29 DEVICE — PLATE BNE L230MM 10 H NONSTERILE R CNDYL S STL CRV LOK: Type: IMPLANTABLE DEVICE | Site: LEG | Status: FUNCTIONAL

## 2021-07-29 DEVICE — SCREW BNE L42MM DIA5MM CNDYL S STL ST VAR ANG LOK T25: Type: IMPLANTABLE DEVICE | Site: LEG | Status: FUNCTIONAL

## 2021-07-29 DEVICE — SCREW BNE L70MM DIA5MM CNDYL S STL ST VAR ANG LOK FULL THRD: Type: IMPLANTABLE DEVICE | Site: LEG | Status: FUNCTIONAL

## 2021-07-29 DEVICE — SCREW BNE L65MM DIA5MM CNDYL S STL ST VAR ANG LOK T25: Type: IMPLANTABLE DEVICE | Site: LEG | Status: FUNCTIONAL

## 2021-07-29 DEVICE — SCREW BNE L75MM DIA5MM CNDYL S STL ST VAR ANG LOK T25: Type: IMPLANTABLE DEVICE | Site: LEG | Status: FUNCTIONAL

## 2021-07-29 DEVICE — SCREW BNE L55MM DIA5MM CNDYL S STL ST VAR ANG LOK FULL THRD: Type: IMPLANTABLE DEVICE | Site: LEG | Status: FUNCTIONAL

## 2021-07-29 DEVICE — SCREW BNE L40MM DIA5MM CNDYL S STL ST VAR ANG LOK T25: Type: IMPLANTABLE DEVICE | Site: LEG | Status: FUNCTIONAL

## 2021-07-29 RX ORDER — HYDRALAZINE HYDROCHLORIDE 20 MG/ML
5 INJECTION INTRAMUSCULAR; INTRAVENOUS EVERY 10 MIN PRN
Status: DISCONTINUED | OUTPATIENT
Start: 2021-07-29 | End: 2021-07-29 | Stop reason: HOSPADM

## 2021-07-29 RX ORDER — ONDANSETRON 2 MG/ML
INJECTION INTRAMUSCULAR; INTRAVENOUS PRN
Status: DISCONTINUED | OUTPATIENT
Start: 2021-07-29 | End: 2021-07-29 | Stop reason: SDUPTHER

## 2021-07-29 RX ORDER — PROMETHAZINE HYDROCHLORIDE 25 MG/ML
6.25 INJECTION, SOLUTION INTRAMUSCULAR; INTRAVENOUS
Status: DISCONTINUED | OUTPATIENT
Start: 2021-07-29 | End: 2021-07-29 | Stop reason: HOSPADM

## 2021-07-29 RX ORDER — OXYCODONE HYDROCHLORIDE AND ACETAMINOPHEN 5; 325 MG/1; MG/1
1 TABLET ORAL PRN
Status: DISCONTINUED | OUTPATIENT
Start: 2021-07-29 | End: 2021-07-29 | Stop reason: HOSPADM

## 2021-07-29 RX ORDER — ONDANSETRON 2 MG/ML
4 INJECTION INTRAMUSCULAR; INTRAVENOUS
Status: DISCONTINUED | OUTPATIENT
Start: 2021-07-29 | End: 2021-07-29 | Stop reason: HOSPADM

## 2021-07-29 RX ORDER — LIDOCAINE HYDROCHLORIDE 20 MG/ML
INJECTION, SOLUTION EPIDURAL; INFILTRATION; INTRACAUDAL; PERINEURAL PRN
Status: DISCONTINUED | OUTPATIENT
Start: 2021-07-29 | End: 2021-07-29 | Stop reason: SDUPTHER

## 2021-07-29 RX ORDER — FENTANYL CITRATE 50 UG/ML
INJECTION, SOLUTION INTRAMUSCULAR; INTRAVENOUS PRN
Status: DISCONTINUED | OUTPATIENT
Start: 2021-07-29 | End: 2021-07-29 | Stop reason: SDUPTHER

## 2021-07-29 RX ORDER — NALOXONE HYDROCHLORIDE 0.4 MG/ML
0.4 INJECTION, SOLUTION INTRAMUSCULAR; INTRAVENOUS; SUBCUTANEOUS PRN
Status: DISCONTINUED | OUTPATIENT
Start: 2021-07-29 | End: 2021-07-29

## 2021-07-29 RX ORDER — LIDOCAINE HYDROCHLORIDE 10 MG/ML
INJECTION, SOLUTION INFILTRATION; PERINEURAL PRN
Status: DISCONTINUED | OUTPATIENT
Start: 2021-07-29 | End: 2021-07-29 | Stop reason: SDUPTHER

## 2021-07-29 RX ORDER — CEFAZOLIN SODIUM 1 G/3ML
INJECTION, POWDER, FOR SOLUTION INTRAMUSCULAR; INTRAVENOUS PRN
Status: DISCONTINUED | OUTPATIENT
Start: 2021-07-29 | End: 2021-07-29 | Stop reason: SDUPTHER

## 2021-07-29 RX ORDER — SODIUM CHLORIDE, SODIUM LACTATE, POTASSIUM CHLORIDE, CALCIUM CHLORIDE 600; 310; 30; 20 MG/100ML; MG/100ML; MG/100ML; MG/100ML
INJECTION, SOLUTION INTRAVENOUS CONTINUOUS PRN
Status: DISCONTINUED | OUTPATIENT
Start: 2021-07-29 | End: 2021-07-29 | Stop reason: SDUPTHER

## 2021-07-29 RX ORDER — FENTANYL CITRATE 50 UG/ML
25 INJECTION, SOLUTION INTRAMUSCULAR; INTRAVENOUS EVERY 5 MIN PRN
Status: DISCONTINUED | OUTPATIENT
Start: 2021-07-29 | End: 2021-07-29 | Stop reason: HOSPADM

## 2021-07-29 RX ORDER — HYDROMORPHONE HYDROCHLORIDE 1 MG/ML
0.5 INJECTION, SOLUTION INTRAMUSCULAR; INTRAVENOUS; SUBCUTANEOUS EVERY 5 MIN PRN
Status: DISCONTINUED | OUTPATIENT
Start: 2021-07-29 | End: 2021-07-29 | Stop reason: HOSPADM

## 2021-07-29 RX ORDER — GLYCOPYRROLATE 1 MG/5 ML
SYRINGE (ML) INTRAVENOUS PRN
Status: DISCONTINUED | OUTPATIENT
Start: 2021-07-29 | End: 2021-07-29 | Stop reason: SDUPTHER

## 2021-07-29 RX ORDER — LABETALOL HYDROCHLORIDE 5 MG/ML
5 INJECTION, SOLUTION INTRAVENOUS EVERY 10 MIN PRN
Status: DISCONTINUED | OUTPATIENT
Start: 2021-07-29 | End: 2021-07-29 | Stop reason: HOSPADM

## 2021-07-29 RX ORDER — NEOSTIGMINE METHYLSULFATE 5 MG/5 ML
SYRINGE (ML) INTRAVENOUS PRN
Status: DISCONTINUED | OUTPATIENT
Start: 2021-07-29 | End: 2021-07-29 | Stop reason: SDUPTHER

## 2021-07-29 RX ORDER — OXYCODONE HYDROCHLORIDE AND ACETAMINOPHEN 5; 325 MG/1; MG/1
2 TABLET ORAL PRN
Status: DISCONTINUED | OUTPATIENT
Start: 2021-07-29 | End: 2021-07-29 | Stop reason: HOSPADM

## 2021-07-29 RX ORDER — NALOXONE HYDROCHLORIDE 0.4 MG/ML
0.4 INJECTION, SOLUTION INTRAMUSCULAR; INTRAVENOUS; SUBCUTANEOUS ONCE
Status: DISCONTINUED | OUTPATIENT
Start: 2021-07-30 | End: 2021-08-02 | Stop reason: HOSPADM

## 2021-07-29 RX ORDER — OXYCODONE HYDROCHLORIDE AND ACETAMINOPHEN 5; 325 MG/1; MG/1
1 TABLET ORAL EVERY 6 HOURS PRN
Qty: 28 TABLET | Refills: 0 | Status: SHIPPED | OUTPATIENT
Start: 2021-07-29 | End: 2021-08-05

## 2021-07-29 RX ORDER — ROPIVACAINE HYDROCHLORIDE 2 MG/ML
INJECTION, SOLUTION EPIDURAL; INFILTRATION; PERINEURAL PRN
Status: DISCONTINUED | OUTPATIENT
Start: 2021-07-29 | End: 2021-07-29 | Stop reason: SDUPTHER

## 2021-07-29 RX ORDER — PHENYLEPHRINE HCL IN 0.9% NACL 1 MG/10 ML
SYRINGE (ML) INTRAVENOUS PRN
Status: DISCONTINUED | OUTPATIENT
Start: 2021-07-29 | End: 2021-07-29 | Stop reason: SDUPTHER

## 2021-07-29 RX ORDER — PROPOFOL 10 MG/ML
INJECTION, EMULSION INTRAVENOUS PRN
Status: DISCONTINUED | OUTPATIENT
Start: 2021-07-29 | End: 2021-07-29 | Stop reason: SDUPTHER

## 2021-07-29 RX ORDER — SODIUM CHLORIDE 9 MG/ML
INJECTION, SOLUTION INTRAVENOUS CONTINUOUS PRN
Status: DISCONTINUED | OUTPATIENT
Start: 2021-07-29 | End: 2021-07-29 | Stop reason: SDUPTHER

## 2021-07-29 RX ORDER — DEXAMETHASONE SODIUM PHOSPHATE 10 MG/ML
INJECTION, SOLUTION INTRAMUSCULAR; INTRAVENOUS PRN
Status: DISCONTINUED | OUTPATIENT
Start: 2021-07-29 | End: 2021-07-29 | Stop reason: SDUPTHER

## 2021-07-29 RX ORDER — ROCURONIUM BROMIDE 10 MG/ML
INJECTION, SOLUTION INTRAVENOUS PRN
Status: DISCONTINUED | OUTPATIENT
Start: 2021-07-29 | End: 2021-07-29 | Stop reason: SDUPTHER

## 2021-07-29 RX ADMIN — MORPHINE SULFATE 2 MG: 2 INJECTION, SOLUTION INTRAMUSCULAR; INTRAVENOUS at 06:37

## 2021-07-29 RX ADMIN — CARBAMAZEPINE 800 MG: 200 CAPSULE, EXTENDED RELEASE ORAL at 08:40

## 2021-07-29 RX ADMIN — ENOXAPARIN SODIUM 40 MG: 40 INJECTION SUBCUTANEOUS at 17:20

## 2021-07-29 RX ADMIN — Medication 25 MCG: at 16:09

## 2021-07-29 RX ADMIN — CARBAMAZEPINE 800 MG: 200 CAPSULE, EXTENDED RELEASE ORAL at 20:06

## 2021-07-29 RX ADMIN — CEFAZOLIN 2000 MG: 1 INJECTION, POWDER, FOR SOLUTION INTRAMUSCULAR; INTRAVENOUS at 13:00

## 2021-07-29 RX ADMIN — SODIUM CHLORIDE, PRESERVATIVE FREE 10 ML: 5 INJECTION INTRAVENOUS at 08:40

## 2021-07-29 RX ADMIN — ONDANSETRON 4 MG: 2 INJECTION, SOLUTION INTRAMUSCULAR; INTRAVENOUS at 12:54

## 2021-07-29 RX ADMIN — SODIUM CHLORIDE, POTASSIUM CHLORIDE, SODIUM LACTATE AND CALCIUM CHLORIDE: 600; 310; 30; 20 INJECTION, SOLUTION INTRAVENOUS at 12:34

## 2021-07-29 RX ADMIN — FENTANYL CITRATE 25 MCG: 50 INJECTION, SOLUTION INTRAMUSCULAR; INTRAVENOUS at 16:40

## 2021-07-29 RX ADMIN — BUMETANIDE 1 MG: 1 TABLET ORAL at 08:40

## 2021-07-29 RX ADMIN — PROPOFOL 100 MG: 10 INJECTION, EMULSION INTRAVENOUS at 12:44

## 2021-07-29 RX ADMIN — FENTANYL CITRATE 25 MCG: 50 INJECTION, SOLUTION INTRAMUSCULAR; INTRAVENOUS at 16:30

## 2021-07-29 RX ADMIN — LIDOCAINE HYDROCHLORIDE 3 ML: 10 INJECTION, SOLUTION INFILTRATION; PERINEURAL at 12:38

## 2021-07-29 RX ADMIN — Medication 100 MCG: at 13:24

## 2021-07-29 RX ADMIN — Medication 50 MCG: at 13:32

## 2021-07-29 RX ADMIN — Medication 100 MCG: at 13:55

## 2021-07-29 RX ADMIN — HYDROCODONE BITARTRATE AND ACETAMINOPHEN 2 TABLET: 5; 325 TABLET ORAL at 20:01

## 2021-07-29 RX ADMIN — ROCURONIUM BROMIDE 50 MG: 10 INJECTION, SOLUTION INTRAVENOUS at 12:44

## 2021-07-29 RX ADMIN — MORPHINE SULFATE 2 MG: 2 INJECTION, SOLUTION INTRAMUSCULAR; INTRAVENOUS at 08:55

## 2021-07-29 RX ADMIN — SODIUM CHLORIDE: 9 INJECTION, SOLUTION INTRAVENOUS at 14:35

## 2021-07-29 RX ADMIN — Medication 25 MCG: at 15:11

## 2021-07-29 RX ADMIN — Medication 200 MCG: at 12:59

## 2021-07-29 RX ADMIN — ROCURONIUM BROMIDE 25 MG: 10 INJECTION, SOLUTION INTRAVENOUS at 14:28

## 2021-07-29 RX ADMIN — Medication 3 MG: at 15:39

## 2021-07-29 RX ADMIN — Medication 0.6 MG: at 15:39

## 2021-07-29 RX ADMIN — ROCURONIUM BROMIDE 25 MG: 10 INJECTION, SOLUTION INTRAVENOUS at 13:47

## 2021-07-29 RX ADMIN — Medication 0.2 MG: at 13:55

## 2021-07-29 RX ADMIN — LIDOCAINE HYDROCHLORIDE 60 MG: 20 INJECTION, SOLUTION EPIDURAL; INFILTRATION; INTRACAUDAL; PERINEURAL at 12:44

## 2021-07-29 RX ADMIN — Medication 200 MCG: at 13:43

## 2021-07-29 RX ADMIN — Medication 200 MCG: at 13:03

## 2021-07-29 RX ADMIN — ROPIVACAINE HYDROCHLORIDE 30 ML: 2 INJECTION, SOLUTION EPIDURAL; INFILTRATION at 12:38

## 2021-07-29 RX ADMIN — Medication 50 MCG: at 12:39

## 2021-07-29 RX ADMIN — DIVALPROEX SODIUM 1250 MG: 500 TABLET, DELAYED RELEASE ORAL at 20:01

## 2021-07-29 RX ADMIN — ATORVASTATIN CALCIUM 40 MG: 40 TABLET, FILM COATED ORAL at 20:01

## 2021-07-29 RX ADMIN — CEFAZOLIN SODIUM 2000 MG: 10 INJECTION, POWDER, FOR SOLUTION INTRAVENOUS at 20:01

## 2021-07-29 RX ADMIN — DIVALPROEX SODIUM 1250 MG: 500 TABLET, DELAYED RELEASE ORAL at 08:40

## 2021-07-29 RX ADMIN — DEXAMETHASONE SODIUM PHOSPHATE 10 MG: 10 INJECTION INTRAMUSCULAR; INTRAVENOUS at 12:54

## 2021-07-29 RX ADMIN — Medication 50 MCG: at 12:44

## 2021-07-29 RX ADMIN — Medication 200 MCG: at 12:51

## 2021-07-29 ASSESSMENT — PULMONARY FUNCTION TESTS
PIF_VALUE: 21
PIF_VALUE: 11
PIF_VALUE: 14
PIF_VALUE: 10
PIF_VALUE: 16
PIF_VALUE: 19
PIF_VALUE: 17
PIF_VALUE: 3
PIF_VALUE: 10
PIF_VALUE: 17
PIF_VALUE: 0
PIF_VALUE: 17
PIF_VALUE: 14
PIF_VALUE: 19
PIF_VALUE: 15
PIF_VALUE: 16
PIF_VALUE: 18
PIF_VALUE: 14
PIF_VALUE: 15
PIF_VALUE: 16
PIF_VALUE: 17
PIF_VALUE: 16
PIF_VALUE: 10
PIF_VALUE: 21
PIF_VALUE: 16
PIF_VALUE: 10
PIF_VALUE: 17
PIF_VALUE: 17
PIF_VALUE: 15
PIF_VALUE: 11
PIF_VALUE: 16
PIF_VALUE: 21
PIF_VALUE: 15
PIF_VALUE: 11
PIF_VALUE: 17
PIF_VALUE: 16
PIF_VALUE: 10
PIF_VALUE: 15
PIF_VALUE: 19
PIF_VALUE: 17
PIF_VALUE: 16
PIF_VALUE: 17
PIF_VALUE: 3
PIF_VALUE: 16
PIF_VALUE: 18
PIF_VALUE: 16
PIF_VALUE: 15
PIF_VALUE: 16
PIF_VALUE: 15
PIF_VALUE: 10
PIF_VALUE: 14
PIF_VALUE: 17
PIF_VALUE: 19
PIF_VALUE: 16
PIF_VALUE: 14
PIF_VALUE: 10
PIF_VALUE: 17
PIF_VALUE: 1
PIF_VALUE: 0
PIF_VALUE: 16
PIF_VALUE: 14
PIF_VALUE: 16
PIF_VALUE: 16
PIF_VALUE: 15
PIF_VALUE: 10
PIF_VALUE: 16
PIF_VALUE: 17
PIF_VALUE: 14
PIF_VALUE: 0
PIF_VALUE: 17
PIF_VALUE: 11
PIF_VALUE: 15
PIF_VALUE: 0
PIF_VALUE: 2
PIF_VALUE: 16
PIF_VALUE: 15
PIF_VALUE: 17
PIF_VALUE: 15
PIF_VALUE: 15
PIF_VALUE: 17
PIF_VALUE: 13
PIF_VALUE: 14
PIF_VALUE: 15
PIF_VALUE: 17
PIF_VALUE: 15
PIF_VALUE: 17
PIF_VALUE: 10
PIF_VALUE: 16
PIF_VALUE: 18
PIF_VALUE: 10
PIF_VALUE: 0
PIF_VALUE: 17
PIF_VALUE: 14
PIF_VALUE: 16
PIF_VALUE: 1
PIF_VALUE: 10
PIF_VALUE: 19
PIF_VALUE: 10
PIF_VALUE: 11
PIF_VALUE: 16
PIF_VALUE: 18
PIF_VALUE: 18
PIF_VALUE: 14
PIF_VALUE: 17
PIF_VALUE: 0
PIF_VALUE: 16
PIF_VALUE: 10
PIF_VALUE: 10
PIF_VALUE: 0
PIF_VALUE: 15
PIF_VALUE: 16
PIF_VALUE: 21
PIF_VALUE: 20
PIF_VALUE: 10
PIF_VALUE: 16
PIF_VALUE: 10
PIF_VALUE: 0
PIF_VALUE: 0
PIF_VALUE: 1
PIF_VALUE: 14
PIF_VALUE: 17
PIF_VALUE: 10
PIF_VALUE: 14
PIF_VALUE: 15
PIF_VALUE: 16
PIF_VALUE: 27
PIF_VALUE: 14
PIF_VALUE: 16
PIF_VALUE: 10
PIF_VALUE: 17
PIF_VALUE: 14
PIF_VALUE: 16
PIF_VALUE: 19
PIF_VALUE: 17
PIF_VALUE: 16
PIF_VALUE: 0
PIF_VALUE: 11
PIF_VALUE: 17
PIF_VALUE: 11
PIF_VALUE: 16
PIF_VALUE: 11
PIF_VALUE: 15
PIF_VALUE: 10
PIF_VALUE: 15
PIF_VALUE: 16
PIF_VALUE: 13
PIF_VALUE: 17
PIF_VALUE: 10
PIF_VALUE: 15
PIF_VALUE: 10
PIF_VALUE: 10
PIF_VALUE: 16
PIF_VALUE: 15
PIF_VALUE: 16
PIF_VALUE: 17
PIF_VALUE: 10
PIF_VALUE: 17
PIF_VALUE: 17
PIF_VALUE: 16
PIF_VALUE: 2
PIF_VALUE: 10
PIF_VALUE: 0
PIF_VALUE: 17
PIF_VALUE: 0
PIF_VALUE: 11
PIF_VALUE: 15
PIF_VALUE: 16
PIF_VALUE: 16
PIF_VALUE: 17
PIF_VALUE: 10
PIF_VALUE: 15
PIF_VALUE: 1
PIF_VALUE: 16
PIF_VALUE: 19
PIF_VALUE: 0
PIF_VALUE: 15
PIF_VALUE: 17
PIF_VALUE: 15
PIF_VALUE: 0
PIF_VALUE: 0
PIF_VALUE: 17
PIF_VALUE: 17
PIF_VALUE: 16
PIF_VALUE: 20
PIF_VALUE: 14
PIF_VALUE: 16
PIF_VALUE: 10
PIF_VALUE: 0
PIF_VALUE: 14
PIF_VALUE: 17
PIF_VALUE: 16
PIF_VALUE: 16
PIF_VALUE: 0
PIF_VALUE: 0
PIF_VALUE: 10
PIF_VALUE: 1
PIF_VALUE: 17
PIF_VALUE: 17
PIF_VALUE: 7
PIF_VALUE: 22
PIF_VALUE: 22
PIF_VALUE: 14
PIF_VALUE: 17
PIF_VALUE: 3
PIF_VALUE: 14

## 2021-07-29 ASSESSMENT — PAIN - FUNCTIONAL ASSESSMENT
PAIN_FUNCTIONAL_ASSESSMENT: 0-10
PAIN_FUNCTIONAL_ASSESSMENT: PREVENTS OR INTERFERES WITH ALL ACTIVE AND SOME PASSIVE ACTIVITIES
PAIN_FUNCTIONAL_ASSESSMENT: 0-10

## 2021-07-29 ASSESSMENT — PAIN DESCRIPTION - LOCATION
LOCATION: LEG
LOCATION: LEG

## 2021-07-29 ASSESSMENT — PAIN DESCRIPTION - FREQUENCY: FREQUENCY: CONTINUOUS

## 2021-07-29 ASSESSMENT — PAIN DESCRIPTION - DESCRIPTORS
DESCRIPTORS: PATIENT UNABLE TO DESCRIBE
DESCRIPTORS: ACHING;SORE

## 2021-07-29 ASSESSMENT — PAIN SCALES - GENERAL
PAINLEVEL_OUTOF10: 6
PAINLEVEL_OUTOF10: 10
PAINLEVEL_OUTOF10: 4
PAINLEVEL_OUTOF10: 4

## 2021-07-29 ASSESSMENT — PAIN DESCRIPTION - ONSET: ONSET: ON-GOING

## 2021-07-29 ASSESSMENT — PAIN DESCRIPTION - PAIN TYPE
TYPE: SURGICAL PAIN
TYPE: ACUTE PAIN

## 2021-07-29 ASSESSMENT — PAIN DESCRIPTION - ORIENTATION
ORIENTATION: RIGHT
ORIENTATION: RIGHT

## 2021-07-29 ASSESSMENT — PAIN DESCRIPTION - PROGRESSION: CLINICAL_PROGRESSION: GRADUALLY WORSENING

## 2021-07-29 NOTE — PROGRESS NOTES
[facility]  Office: [address], DO, [facility] Jose, DO, Joe Maciel, DO, Keara Cobian, DO, Carol Bermudez MD, Lisa Callejas MD, Erika Sánchez MD, Kg Mcgrath MD, Jasvir Polo MD, Luigi Ruiz MD, Mango Solorio MD, Abdiel Haines, DO, Rossi Parish MD, Luzmaria Umanzor, DO, Nick Cobos MD,  Merlyn Valentine DO, Mallory Butler MD, Zachary Skinner MD, Koki Patino MD, Dwayne Merino MD, Corine Rabago MD, Benson Marsh MD, Patrice Arguello, [facility], [facility], CNP, Griffin Tobin, CNP, Nubia Ann, CNS, Henrry Nicole, CNP, Omid Bassett, CNP, Sedrick Armenta, CNP, Nilda Schumacher, CNP, Lisandro Beltran, CNP, Edwina Holder PA-C, Belén Hein, [facility], Long Rodrigues, CNP, Jamir Cardenas, CNP, Penny Freedman, CNP, Gwendolyn Glalagher, CNP, Nik Crowe, CNP, Dede Key, CNP, Elsa Harper, [facility], Rizwana Richardson, [facility]    Progress Note    7/29/2021    9:23 AM    Name:   Jan Israel  MRN:     [id number]     [place]:      [de-identified]   Room:   2009/2009-02  IP Day:  1  Admit Date:  7/28/2021 12:52 PM    PCP:   Errol Clark DO  Code Status:  Full Code    Subjective:     C/C:   Chief Complaint   Patient presents with   Jannell Outhouse Fall     right leg and left hand injury. Interval History Status:     Patient is resting comfortably, just recently had pain meds and  Is a little drowsy but opens eyes to voice. VSS. Brief History:   Jan Israel is a 77 y.o. Non- / non  female with a history of hypertension, age-related osteoporosis with previous history of fractures, seizure disorder and MRDD who presents from a group home with after a mechanical fall iin a bathroom with a right leg and left hand injury and is admitted to the hospital for the management of Closed comminuted intra-articular fracture of distal end of right femur ([facility] [address] She is a poor historian and history obtained from EHR and medical staff.  She points to her knee and states it hurts. CT head unremarkable, left handed wrist x-rays unremarkable for fracture. X-ray of her right knee revealed a comminuted distal femur fracture. Ortho was consulted from ER who recommended a CT of her knee and plans to take her to the OR for repair   Review of Systems:     Limited due to developmental delay: + right knee paim    Medications: Allergies:  No Known Allergies    Current Meds:   Scheduled Meds:    atorvastatin  40 mg Oral Nightly    bumetanide  1 mg Oral Daily    sodium chloride flush  5-40 mL Intravenous 2 times per day    enoxaparin  40 mg Subcutaneous Daily    divalproex  1,250 mg Oral BID    carBAMazepine  800 mg Oral BID     Continuous Infusions:    sodium chloride       PRN Meds: docusate sodium, sodium chloride flush, sodium chloride, ondansetron **OR** ondansetron, polyethylene glycol, acetaminophen, HYDROcodone 5 mg - acetaminophen **OR** HYDROcodone 5 mg - acetaminophen, morphine    Data:     Past Medical History:   has a past medical history of Anxiety, Arthritis, Cataracts, bilateral, Essential (primary) hypertension, Fracture (healed) treatment follow-up, Mental retardation, Movement disorder, Primary osteoarthritis involving multiple joints, Rheumatoid arthritis ([facility] [address] and Seizures ([facility] [address] Social History:   reports that she has never smoked. She has never used smokeless tobacco. She reports that she does not drink alcohol and does not use drugs. Family History:   Family History   Family history unknown: Yes       Vitals:  BP (!) 119/49   Pulse 93   Temp 99.5 °F (37.5 °C) (Oral)   Resp 20   Ht 5' (1.524 m)   Wt 134 lb (60.8 kg)   SpO2 97%   BMI 26.17 kg/m²   Temp (24hrs), Av.3 °F (36.8 °C), Min:97.7 °F (36.5 °C), Max:99.5 °F (37.5 °C)    No results for input(s): POCGLU in the last 72 hours. I/O (24Hr):     Intake/Output Summary (Last 24 hours) at 2021 0923  Last data filed at 2021 9920  Gross per 24 hour   Intake --   Output 320 ml   Net -320 ml       Labs:  Hematology:  Recent Labs     07/28/21  1600 07/29/21  0536   WBC 8.0 4.3   RBC 3.33* 2.32*   HGB 10.8* 7.6*   HCT 34.9* 24.5*   .8* 105.6*   MCH 32.4 32.8   MCHC 30.9 31.0   RDW 13.2 13.2    103*   MPV 11.4 11.0   INR 1.1  --      Chemistry:  Recent Labs     07/28/21  1600 07/29/21  0536   * 144   K 4.6 4.1   * 110*   CO2 26 27   GLUCOSE 110* 94   BUN 32* 32*   CREATININE 0.69 0.61   ANIONGAP 10 7*   LABGLOM >60 >60   GFRAA >60 >60   CALCIUM 9.0 8.1*   No results for input(s): PROT, LABALBU, LABA1C, Y5DCGSB, B7IOXVU, FT4, TSH, AST, ALT, LDH, GGT, ALKPHOS, LABGGT, BILITOT, BILIDIR, AMMONIA, AMYLASE, LIPASE, LACTATE, CHOL, HDL, LDLCHOLESTEROL, CHOLHDLRATIO, TRIG, VLDL, AMB92ND, PHENYTOIN, PHENYF, URICACID, POCGLU in the last 72 hours. ABG:No results found for: POCPH, PHART, PH, POCPCO2, HAB4ZOS, PCO2, POCPO2, PO2ART, PO2, POCHCO3, YSJ8UZH, HCO3, NBEA, PBEA, BEART, BE, THGBART, THB, DEO3ABX, JHGV2YZT, P0ZTMXDS, O2SAT, FIO2  Lab Results   Component Value Date/Time    SPECIAL NOT REPORTED 09/02/2016 10:02 AM     Lab Results   Component Value Date/Time    CULTURE NO SIGNIFICANT GROWTH 09/02/2016 10:02 AM    CULTURE  09/02/2016 10:02 AM     Performed at [address] [place] [address] [phone]       Radiology:  XR WRIST LEFT (MIN 3 VIEWS)    Result Date: 7/28/2021  Diffuse osteopenia without acute osseous or soft tissue abnormality. XR HAND LEFT (MIN 3 VIEWS)    Result Date: 7/28/2021  Diffuse degenerative joint disease without acute osseous or soft tissue abnormality. XR FEMUR RIGHT (MIN 2 VIEWS)    Result Date: 7/28/2021  Comminuted fracture of the distal femoral metaphysis with impaction. XR KNEE RIGHT (3 VIEWS)    Result Date: 7/28/2021  Comminuted impacted fracture of the distal femoral metaphysis.      XR TIBIA FIBULA RIGHT (2 VIEWS)    Result Date: 7/28/2021  Comminuted fracture of the distal femoral metaphysis with impaction. Diffuse soft tissue swelling. CT HEAD WO CONTRAST    Result Date: 7/28/2021  No acute intracranial abnormality. Stable compared to prior study     CT KNEE RIGHT WO CONTRAST    Result Date: 7/28/2021  Comminuted mildly displaced fracture of the distal femoral metadiaphysis as above. The bones appear demineralized. Small knee joint effusion. Physical Examination:        General appearance:  Drowsy but awakens easily, cooperative and no distress  Mental Status:  oriented to person, developmental delay  Lungs:  clear to auscultation bilaterally, normal effort  Heart:  regular rate and rhythm, no murmur  Abdomen:  soft, nontender, nondistended, normal bowel sounds, no masses, hepatomegaly, splenomegaly  Extremities:  +2 BLE chronic edema, redness, no tenderness in the calves  Skin:  no gross lesions, rashes, induration  ecchymosis to left cheek   Assessment:        Hospital Problems         Last Modified POA    * (Principal) Closed comminuted intra-articular fracture of distal end of right femur ([address] 7/28/2021 Yes    Seizure disorder (Chronic) 7/28/2021 Yes    Mental retardation (Chronic) 7/28/2021 Yes    Overview Signed 6/10/2019  8:50 AM by [employer] [facility]     Replacing deprecated diagnoses         Anxiety (Chronic) 7/28/2021 Yes    Age-related osteoporosis with current pathological fracture with routine healing 7/28/2021 Yes    Dependence on wheelchair 7/28/2021 Yes    Essential (primary) hypertension 7/28/2021 Yes          Plan:        1. Keep NPO, plan for surgery this afternoon  2. Monitor labs, replace electrolytes as needed   3. HGB dropped from 10.8 yesterday to 7.6 today, will repeat H&H now to make sure its not dilutional and trend every 6 hours if needed. . No overt signs of bleeding. Check stool for occult blood   4. Continue telemetry monitoring  5. Continue pain control, IV morphine while NPO   6. Hold lovenox until after surgery   7. PT/OT after surgery   8.  Plan discussed with staff     YASHIRA Lee NP  7/29/2021  9:23 AM

## 2021-07-29 NOTE — ANESTHESIA PROCEDURE NOTES
Peripheral Block    Patient location during procedure: OR  Start time: 7/29/2021 12:35 PM  End time: 7/29/2021 12:40 PM  Staffing  Performed: anesthesiologist   Anesthesiologist: Evy Beauchamp DO  Preanesthetic Checklist  Completed: patient identified, IV checked, site marked, risks and benefits discussed, surgical consent, monitors and equipment checked, pre-op evaluation, timeout performed, anesthesia consent given, oxygen available and patient being monitored  Peripheral Block  Patient position: supine  Prep: ChloraPrep  Patient monitoring: cardiac monitor, continuous pulse ox, frequent blood pressure checks and IV access  Block type: Femoral  Laterality: right  Injection technique: single-shot  Guidance: ultrasound guided  Local infiltration: lidocaine  Infiltration strength: 1 %  Dose: 3 mL  Femoral crease  Provider prep: mask and sterile gloves  Local infiltration: lidocaine  Needle  Needle type: combined needle/nerve stimulator   Needle gauge: 22 G  Needle length: 5 cm  Needle localization: ultrasound guidance  Assessment  Injection assessment: negative aspiration for heme, no paresthesia on injection and local visualized surrounding nerve on ultrasound  Paresthesia pain: none  Slow fractionated injection: yes  Hemodynamics: stable  Additional Notes  Right femoral single shot  30ml 0.2% ropivacaine             Reason for block: post-op pain management and at surgeon's request

## 2021-07-29 NOTE — PLAN OF CARE
Problem: Skin Integrity:  Goal: Will show no infection signs and symptoms  Description: Will show no infection signs and symptoms  Outcome: Ongoing  Goal: Absence of new skin breakdown  Description: Absence of new skin breakdown  7/29/2021 1000 by Adi Pascual RN  Outcome: Ongoing  7/29/2021 0546 by Sawyer Peters RN  Outcome: Ongoing  Note: Checked for incontinence every 2 hours and prn. Pericare as needed. Assisted to reposition off back frequently. Heels off bed with pillows. Will continue to monitor. Problem: Falls - Risk of:  Goal: Will remain free from falls  Description: Will remain free from falls  7/29/2021 1000 by Adi Pascual RN  Outcome: Ongoing  Note: Siderails up x 2  Hourly rounding  Call light in reach  Instructed to call for assist before attempting out of bed. Remains free from falls and accidental injury at this time   Floor free from obstacles  Bed is locked and in lowest position  Adequate lighting provided  Bed alarm on, [place] star and Stay with Me signs posted      7/29/2021 0546 by Sawyer Peters RN  Outcome: Ongoing  Note: Siderails up x 2  Hourly rounding  Call light in reach  Instructed to call for assist before attempting out of bed. Remains free from falls and accidental injury at this time   Floor free from obstacles  Bed is locked and in lowest position  Adequate lighting provided  Bed alarm on, [place] star and Stay with Me signs posted  Will continue to monitor. Goal: Absence of physical injury  Description: Absence of physical injury  Outcome: Ongoing     Problem: Pain:  Description: Pain management should include both nonpharmacologic and pharmacologic interventions. Goal: Pain level will decrease  Description: Pain level will decrease  Outcome: Ongoing  Note: Pain level assessment complete.    Patient educated on pain scale and control interventions  PRN pain medication given per patient request  Patient instructed to call out with new onset of pain or unrelieved pain    Goal: Control of acute pain  Description: Control of acute pain  7/29/2021 1000 by Yayo Jeffries RN  Outcome: Ongoing  7/29/2021 0546 by Janie Robles RN  Outcome: Ongoing  Note: Pain level assessment complete. Patient educated on pain scale and control interventions  PRN pain medication given per patient request  Patient instructed to call out with new onset of pain or unrelieved pain  Will continue to monitor.   Goal: Control of chronic pain  Description: Control of chronic pain  Outcome: Ongoing

## 2021-07-29 NOTE — ANESTHESIA PRE PROCEDURE
Department of Anesthesiology  Preprocedure Note       Name:  Lainey Recinos   Age:  77 y.o.  :  1945                                          MRN:  [id number]         Date:  2021      Surgeon: Dwayne Sanders):  Vito Bañuelos MD    Procedure: Procedure(s): FEMUR OPEN REDUCTION INTERNAL FIXATION WITH SYNTHES DISTAL FEMEPLATE    Medications prior to admission:   Prior to Admission medications    Medication Sig Start Date End Date Taking? Authorizing Provider   divalproex (DEPAKOTE) 250 MG DR tablet Take 250 mg by mouth 2 times daily Takes 250mg + 500mg tabs for total 1250mg BID dose   Yes Historical Provider, MD   divalproex (DEPAKOTE) 500 MG DR tablet Take 1,000 mg by mouth 2 times daily Takes 500mg tabs + 250mg tabs for 1250mg BID dose   Yes Historical Provider, MD   atorvastatin (LIPITOR) 40 MG tablet Take 1 tablet by mouth daily  Patient taking differently: Take 40 mg by mouth nightly  21  Yes Saeid Manley DO   bumetanide (BUMEX) 1 MG tablet TAKE 1 TABLET BY MOUTH DAILY 21  Yes Saeid Manley DO   naproxen (NAPROSYN) 500 MG tablet TAKE 1 TABLET BY MOUTH TWICE A DAY WITH MEALS 21 Yes Marilia Netters, DO   STOOL SOFTENER 100 MG capsule TAKE 1 CAPSULE BY MOUTH TWICE A DAY AS NEEDED FOR CONSTIPATION 9/3/20  Yes Lius June MD   carBAMazepine (CARBATROL) 200 MG extended release capsule TAKE 4 CAPSULES BY MOUTH TWICE A DAY. Give second dose at bedtime.  20  Yes Newton Hernandez MD   alendronate (FOSAMAX) 70 MG tablet Take 1 tablet by mouth every 7 days  Patient taking differently: Take 70 mg by mouth every 7 days Indications: Sundays  8/31/20  Yes Luis June MD       Current medications:    Current Facility-Administered Medications   Medication Dose Route Frequency Provider Last Rate Last Admin    [MAR Hold] docusate sodium (COLACE) capsule 100 mg  100 mg Oral BID PRN YASHIRA Mccormack - NP        [MAR Hold] atorvastatin (LIPITOR) tablet 40 mg  40 mg Oral Nightly YASHIRA Mccormack NP        [facility] Hold] bumetanide (BUMEX) tablet 1 mg  1 mg Oral Daily YASHIRA Mccormack NP   1 mg at 07/29/21 0840    [MAR Hold] sodium chloride flush 0.9 % injection 5-40 mL  5-40 mL Intravenous 2 times per day Dionicia Frankel, APRN - NP   10 mL at 07/29/21 0840    [MAR Hold] sodium chloride flush 0.9 % injection 5-40 mL  5-40 mL Intravenous PRN YASHIRA Mccormack NP        [MAR Hold] 0.9 % sodium chloride infusion  25 mL Intravenous PRN YASHIRA Mccormack NP        [MAR Hold] ondansetron (ZOFRAN-ODT) disintegrating tablet 4 mg  4 mg Oral Q8H PRN YASHIRA Mccormack NP        Or   [facility] Hold] ondansetron (ZOFRAN) injection 4 mg  4 mg Intravenous Q6H PRN YASHIRA Mccormack NP        [MAR Hold] polyethylene glycol (GLYCOLAX) packet 17 g  17 g Oral Daily PRN YASHIRA Mccormack NP        [MAR Hold] enoxaparin (LOVENOX) injection 40 mg  40 mg Subcutaneous Daily YASHIRA Mccormack NP        [MAR Hold] acetaminophen (TYLENOL) tablet 650 mg  650 mg Oral Q4H PRN YASHIRA Mccormack NP        [MAR Hold] HYDROcodone-acetaminophen (NORCO) 5-325 MG per tablet 1 tablet  1 tablet Oral Q4H PRN Dionicia Frankel, APRN - NP   1 tablet at 07/28/21 2126    Or    [MAR Hold] HYDROcodone-acetaminophen (NORCO) 5-325 MG per tablet 2 tablet  2 tablet Oral Q4H PRN YASHIRA Mccormack NP        [MAR Hold] morphine (PF) injection 2 mg  2 mg Intravenous Q2H PRN Dionicia Frankel, APRN - NP   2 mg at 07/29/21 0855    [MAR Hold] divalproex (DEPAKOTE) DR tablet 1,250 mg  1,250 mg Oral BID Joyce Torres MD   1,250 mg at 07/29/21 0840    [MAR Hold] carBAMazepine (CARBATROL) extended release capsule 800 mg  800 mg Oral BID Joyce Torres MD   800 mg at 07/29/21 0840     Facility-Administered Medications Ordered in Other Encounters   Medication Dose Route Frequency Provider Last Rate Last Admin    fentaNYL (SUBLIMAZE) injection   Intravenous PRN Zachary Petersen APRN - CRNA   50 mcg at 07/29/21 1332    lactated ringers infusion   Intravenous Continuous PRN Brendalyn Dayhoff, APRN - CRNA   New Bag at 07/29/21 1234    ceFAZolin (ANCEF) injection   Intravenous PRN Brendalyn Dayhoff, APRN - CRNA   2,000 mg at 07/29/21 1300    phenylephrine (SCOTT-SYNEPHRINE) 1 MG/10ML prefilled syringe   Intravenous PRN Brendalyn Dayhoff, APRN - CRNA   100 mcg at 07/29/21 1355    ondansetron (ZOFRAN) injection   Intravenous PRN Brendalyn Dayhoff, APRN - CRNA   4 mg at 07/29/21 1254    dexamethasone (PF) (DECADRON) injection   Intravenous PRN Brendalyn Dayhoff, APRN - CRNA   10 mg at 07/29/21 1254    lidocaine PF 2 % injection   Intravenous PRN Brendalyn Dayhoff, APRN - CRNA   60 mg at 07/29/21 1244    rocuronium (ZEMURON) injection   Intravenous PRN Brendalyn Dayhoff, APRN - CRNA   25 mg at 07/29/21 1428    propofol injection   Intravenous PRN Brendalyn Dayhoff, APRN - CRNA   100 mg at 07/29/21 1244    glycopyrrolate (ROBINUL) injection   Intravenous PRN Brendalyn Dayhoff, APRN - CRNA   0.2 mg at 07/29/21 1355    0.9 % sodium chloride infusion   Intravenous Continuous PRN Brendalyn Dayhoff, APRN - CRNA   New Bag at 07/29/21 1435       Allergies:  No Known Allergies    Problem List:    Patient Active Problem List   Diagnosis Code    Seizure disorder G40.909    Mental retardation F79    Anxiety F41.9    Constipation K59.00    Age-related osteoporosis with current pathological fracture with routine healing M80.00XD    Dependence on wheelchair Z99.3    Difficulty walking R26.2    Bilateral lower extremity edema R60.0    Essential (primary) hypertension I10    Closed comminuted intra-articular fracture of distal end of right femur ([address] S72.491A    Closed fracture of right femur ([address] S72.91XA       Past Medical History:        Diagnosis Date    Anxiety     Arthritis     Cataracts, bilateral     Essential (primary) hypertension 3/6/2019    Fracture (healed) treatment follow-up 2015    hand fx post fall    Mental retardation     Movement disorder     Primary osteoarthritis involving multiple joints 2/17/2017    Rheumatoid arthritis ([facility] [address]     Seizures ([facility] [address]        Past Surgical History:        Procedure Laterality Date    HIP FRACTURE SURGERY Right 11/20/14    LEG SURGERY Right 07/18/2018    RIGHT TIBIA IM MARÍA -KATE KNEE TRIANGLES, INTRAMEDULLARY REAMERS, SYNTHES TIBIAL NAIL    [place] TREAT TIBIAL SHAFT FX, INTRAMED IMPLANT Right 7/18/2018    RIGHT TIBIA IM MARÍA -KATE KNEE TRIANGLES, INTRAMEDULLARY REAMERS, SYNTHES TIBIAL NAIL performed by Shell Townsend DO at [place] [address] History:    Social History     Tobacco Use    Smoking status: Never Smoker    Smokeless tobacco: Never Used   Substance Use Topics    Alcohol use: No     Alcohol/week: 0.0 standard drinks                                Counseling given: Not Answered      Vital Signs (Current):   Vitals:    07/28/21 1948 07/29/21 0009 07/29/21 0741 07/29/21 1145   BP: 103/69 (!) 122/45 (!) 119/49 115/62   Pulse: 86 93 93 89   Resp: 16 17 20 20   Temp: 98.3 °F (36.8 °C) 97.9 °F (36.6 °C) 99.5 °F (37.5 °C) 98.4 °F (36.9 °C)   TempSrc: Oral Oral Oral Oral   SpO2: 100% 98% 97% 97%   Weight:       Height:                                                  BP Readings from Last 3 Encounters:   07/29/21 115/62   07/29/21 (!) 127/58   07/09/21 130/60       NPO Status:                                                                                 BMI:   Wt Readings from Last 3 Encounters:   07/28/21 134 lb (60.8 kg)   03/25/21 140 lb (63.5 kg)   01/21/21 140 lb (63.5 kg)     Body mass index is 26.17 kg/m².     CBC:   Lab Results   Component Value Date    WBC 4.3 07/29/2021    RBC 2.32 07/29/2021    RBC 3.53 03/07/2019    HGB 7.8 07/29/2021    HCT 24.7 07/29/2021    .6 07/29/2021    RDW 13.2 07/29/2021     07/29/2021       CMP:   Lab Results   Component Value Date     07/29/2021    K 4.1 07/29/2021     07/29/2021    CO2 27 07/29/2021    BUN 32 07/29/2021    CREATININE 0.61 07/29/2021    GFRAA >60 07/29/2021    LABGLOM >60 07/29/2021    GLUCOSE 94 07/29/2021    GLUCOSE 85 03/07/2019    PROT 6.4 07/19/2021    PROT 6.4 03/06/2019    CALCIUM 8.1 07/29/2021    BILITOT 0.26 07/19/2021    ALKPHOS 121 07/19/2021    AST 14 07/19/2021    ALT 8 07/19/2021       POC Tests: No results for input(s): POCGLU, POCNA, POCK, POCCL, POCBUN, POCHEMO, POCHCT in the last 72 hours. Coags:   Lab Results   Component Value Date    PROTIME 13.9 07/28/2021    INR 1.1 07/28/2021    APTT 38.3 07/28/2021       HCG (If Applicable): No results found for: PREGTESTUR, PREGSERUM, HCG, HCGQUANT     ABGs: No results found for: PHART, PO2ART, EYN8GTI, VCH3FOZ, BEART, M2HKDNSB     Type & Screen (If Applicable):  No results found for: LABABO, LABRH    Drug/Infectious Status (If Applicable):  No results found for: HIV, HEPCAB    COVID-19 Screening (If Applicable):   Lab Results   Component Value Date    COVID19 Not Detected 07/28/2021           Anesthesia Evaluation  Patient summary reviewed and Nursing notes reviewed no history of anesthetic complications:   Airway: Mallampati: II  TM distance: >3 FB   Neck ROM: full  Mouth opening: > = 3 FB Dental:    (+) edentulous      Pulmonary:normal exam        (-) asthma                           Cardiovascular:  Exercise tolerance: no interval change,   (+) hypertension:,     (-) past MI, CAD and CABG/stent        Rate: normal                    Neuro/Psych:   (+) psychiatric history:            GI/Hepatic/Renal:        (-) GERD       Endo/Other:    (+) : arthritis:., .                 Abdominal:             Vascular: Other Findings:             Anesthesia Plan      general     ASA 3       Induction: intravenous. MIPS: Postoperative opioids intended and Prophylactic antiemetics administered.   Anesthetic plan and risks discussed with patient (also discussed with care taker, patient has no family ).      Plan discussed with CRNA.                   Linda Vasquez, DO   7/29/2021

## 2021-07-29 NOTE — PROGRESS NOTES
Pt back in room #2009 from PACU  Oriented to room and call light/tv controls. Bed in lowest position, wheels locked, 2/4 side rails up  Call light in reach, room free of clutter, adequate lighting provided.

## 2021-07-29 NOTE — BRIEF OP NOTE
Brief Postoperative Note      Patient: Jan Israel  YOB: 1945  MRN: [id number]    Date of Procedure: 7/29/2021    Pre-Op Diagnosis: femur fx    Post-Op Diagnosis: Right distal femur fracture        Procedure(s):  RIGHT FEMUR OPEN REDUCTION INTERNAL FIXATION    Surgeon(s):  Louis Daley MD    Assistant:  Surgical Assistant: Parish Scott; Everett Nicholson  Resident: Tee Whitmore DO    Anesthesia: General    Estimated Blood Loss (mL): 200 mL    1u pRBC given    Fluids: 1200 mL crystalloids    Complications: None    Specimens:   * No specimens in log *    Implants:  Implant Name Type Inv.  Item Serial No.  Lot No. LRB No. Used Action   PLATE BNE [id number] 10 H NONSTERILE R CNDYL S STL CRV MADAI  PLATE BNE [id number] 10 H NONSTERILE R CNDYL S STL CRV MADAI  DEPUY SYNTHES [place]  Right 1 Implanted   SCREW BNE L40MM DIA5MM CNDYL S STL ST KHADRA ANG MADAI T25  SCREW BNE L40MM DIA5MM CNDYL S STL ST KHADRA ANG MADAI T25  DEPUY SYNTHES [employer]-WD  Right 4 Implanted   SCREW BNE L55MM DIA5MM CNDYL S STL ST KHADRA ANG MADAI FULL THRD  SCREW BNE L55MM DIA5MM CNDYL S STL ST KHADRA ANG MADAI FULL THRD  DEPUY SY[employer]ES [employer]-WD  Right 1 Implanted   SCREW BNE L65MM DIA5MM CNDYL S STL ST KHADRA ANG MADAI T25  SCREW BNE L65MM DIA5MM CNDYL S STL ST KHADRA ANG MADAI T25  DEPUY SYNTHES [employer]-WD  Right 1 Implanted   SCREW BNE L70MM DIA5MM CNDYL S STL ST KHADRA ANG MADAI FULL THRD  SCREW BNE L70MM DIA5MM CNDYL S STL ST KHADRA ANG MADAI FULL THRD  DEPUY SY[employer]THES [employer]-WD  Right 1 Implanted   SCREW BNE L75MM DIA5MM CNDYL S STL ST KHADRA ANG MADAI T25  SCREW BNE L75MM DIA5MM CNDYL S STL ST KHADRA ANG MADAI T25  DEPUY SY[employer]ES [employer]-WD  Right 2 Implanted   SCREW BNE L42MM DIA5MM CNDYL S STL ST KHADRA ANG MADAI T25  SCREW BNE L42MM DIA5MM CNDYL S STL ST KHADRA ANG MADAI T25  DEPUY SY[employer]ES [employer]-WD  Right 1 Implanted   SCREW BNE L18MM DIA5MM CNDYL S STL ST KHADRA ANG MADAI COMPR T25  SCREW BNE L18MM DIA5MM CNDYL S STL ST KHADRA ANG MADAI COMPR T25  DEPUY SYNTHES [place]-WD  Right 1 Implanted         Drains: * No LDAs found *    Findings: see op note    Electronically signed by Tee Whitmore DO on 7/29/2021 at 4:20 PM

## 2021-07-29 NOTE — CONSULTS
This patient was seen in consultation this afternoon. I did get a call from the emergency room with Hanna Barnard who told me that she has sustained a fracture. I did speak with the caretaker who told me that she ambulates a little bit to the bathroom. On the whole her activity is limited. The patient is known communicating and everything is to go through the caretaker. She has very short legs and stubby and swollen bilaterally.   Marked tenderness over the distal femur    X-rays: She is severely comminuted supracondylar fracture of the right femur and discussed with the caretaker that she requires open reduction internal fixation for this and she was agreeable thank you

## 2021-07-29 NOTE — OP NOTE
Operative Note    Patient: Lainey Recinos  YOB: 1945  MRN: [id number]     Date of Procedure: 7/29/2021     Pre-Op Diagnosis: femur fx     Post-Op Diagnosis: Right distal femur fracture        Procedure(s):  RIGHT FEMUR OPEN REDUCTION INTERNAL FIXATION     Surgeon(s):  Vito Bañuelos MD     Assistant:  Surgical Assistant: Tio Esposito; Joaquim León  Resident: Marbin Wong DO     Anesthesia: General     Estimated Blood Loss (mL): 200 mL     1u pRBC given     Fluids: 1200 mL crystalloids     Complications: None     Specimens:   * No specimens in log *     Implants:  Implant Name Type Inv.  Item Serial No.  Lot No. LRB No. Used Action   PLATE BNE [id number] 10 H NONSTERILE R CNDYL S STL CRV MADAI   PLATE BNE [id number] 10 H NONSTERILE R CNDYL S STL CRV MADAI   DEPUY [employer]WD   Right 1 Implanted   SCREW BNE L40MM DIA5MM CNDYL S STL ST KHADRA ANG MADAI T25   SCREW BNE L40MM DIA5MM CNDYL S STL ST KHADRA ANG MADAI T25   DEPUY [employer]-WD   Right 4 Implanted   SCREW BNE L55MM DIA5MM CNDYL S STL ST KHADRA ANG MADAI FULL THRD   SCREW BNE L55MM DIA5MM CNDYL S STL ST KHADRA ANG MADAI FULL THRD   DEPUY [employer]-WD   Right 1 Implanted   SCREW BNE L65MM DIA5MM CNDYL S STL ST KHADRA ANG MADAI T25   SCREW BNE L65MM DIA5MM CNDYL S STL ST KHADRA ANG MADAI T25   DEPUY [employer]-WD   Right 1 Implanted   SCREW BNE L70MM DIA5MM CNDYL S STL ST KHADRA ANG MADAI FULL THRD   SCREW BNE L70MM DIA5MM CNDYL S STL ST KHADRA ANG MADAI FULL THRD   DEPUY [employer]-WD   Right 1 Implanted   SCREW BNE L75MM DIA5MM CNDYL S STL ST KHADRA ANG MADAI T25   SCREW BNE L75MM DIA5MM CNDYL S STL ST KHADRA ANG MADAI T25   DEPUY [employer]-WD   Right 2 Implanted   SCREW BNE L42MM DIA5MM CNDYL S STL ST KHADRA ANG MADAI T25   SCREW BNE L42MM DIA5MM CNDYL S STL ST KHADRA ANG MADAI T25   DEPUY [employer]-WD   Right 1 Implanted   SCREW BNE L18MM DIA5MM CNDYL S STL ST KHADRA ANG MADAI COMPR T25   SCREW BNE L18MM DIA5MM CNDYL S STL ST KHADRA ANG MADAI COMPR T25   DEPUY [employer]-WD   Right 1 Implanted          Drains: * No LDAs found *     Findings: see op note    OPERATIVE REPORT    Surgical Indications:  Darcy Dangelo is a 77 y.o. old female who presented with right distal femur fracture. Following a discussion with the patient regarding both non-operative and operative treatment options, they consented to proceed with right distal femur open reduction internal fixation. She came to this decision after demonstrating an understanding of our discussion regarding details of the procedure, risks and benefits, expected outcome, and postoperative course. Operative technique: Following appropriate identification of the patient and her operative extremity, consent was reviewed with the patient and Her operative extremity was signed. She was wheeled to the operating room where she finished a course of pre-operative antibiotic prophylaxis by way of anc. The anesthesia service induced general anesthesia without any complications. All bony prominences were appropriately padded and the patient was secured to the operative table in a supine position. The patient's operative extremity was prepped and draped in a standard sterile fashion and a time out was performed during which the correct patient, operative extremity, and procedure were verified. Utilizing C arm, fracture was able to be reduced with manual manipulation and a radiolucent triangle. Once this was confirmed, we we began the approach portion of the case. Going from Marie's tubercle to the lateral aspect of the femur and incision was drawn out measuring approximately 20 cm in length. Utilized Bovie cautery, the fat was brought down to the level of the IT band where then a Keller was used to elevate the fat off the IT band. Sharp incision was made in line with the superficial incision down the IT band to the level of the Marie tubercle. The vastus lateralis was then encountered and elevated from a posterior to anterior direction.   The fracture at the distal femur was then seen. An arthrotomy was also performed to visualize the femoral condyle were no sagittal split was noted in the notch. Following this, the fracture was debrided utilizing rongeur, curette, and knife. It was noted that the fracture was extremely comminuted and it was very difficult to get a read of reduction. Once we felt we had adequate reduction based off the posterior femoral cortex, 3 large Steinmann pins were then fired through the femoral condyle proximally spanning the fracture site to the femoral shaft in a bicortical fashion. Once this was finished, a 10 hole Synthes lateral distal femur plate was then inserted submuscularly to the level of her hip prosthesis. At this time, fluoroscopy was used to confirm plate position and to ensure that the plate that spanned the stem of the prosthesis. Once we confirmed our plate placement, the screws were locked distally along the femoral condyle. Once the screws distally were filled to the distal fragment, we then filled the proximal screws with 3 holes spanning past the total hip stem. Fluoroscopy was used at all times to ensure that anatomic fracture reduction was maintained. There is noted to be callus formation along the total hip stem in valgus position alluding to a previous fracture. Thusly so, the lateral femoral plate sat off the bone at the mid diaphysis due to the plate sitting on top of the callus proximally. As this is a single device with locking screws, no contouring of the plate was performed is no nonlocking cortical screws were used. Once we finished placing the screws into the plate, Betadine was used to irrigate the wound. Utilizing 0 Vicryl, 2-0 Vicryl, and staples layer closure was obtained of the incision sites. The distal lateral incision was covered using a Nhung which maintain suction. The percutaneous incision sites that were used for medial technique of the plate was then covered and a Adaptic, 4 x 4, and Tegaderm. Patient was woken from anesthesia without any complications and wheeled to the PACU in stable condition    Dr. Candace Adams was present for all critical aspects of the case.     Chelle Karimi DO  Orthopedic Surgery Resident, PGY-2  [address] \[facility]\""

## 2021-07-29 NOTE — ANESTHESIA POSTPROCEDURE EVALUATION
Department of Anesthesiology  Postprocedure Note    Patient: Apollo Mallory  MRN: [id number]  YOB: 1945  Date of evaluation: 7/29/2021  Time:  6:45 PM     Procedure Summary     Date: 07/29/21 Room / Location: [address] [place] [address]    Anesthesia Start: 1234 Anesthesia Stop: 9661    Procedure: RIGHT FEMUR OPEN REDUCTION INTERNAL FIXATION (Right Leg Upper) Diagnosis: (femur fx)    Surgeons: Surjit Veronica MD Responsible Provider: Priscilla Shankar DO    Anesthesia Type: general, regional ASA Status: 3          Anesthesia Type: general, regional    Tricia Phase I: Tricia Score: 9    Tricia Phase II:      Last vitals: Reviewed and per EMR flowsheets.        Anesthesia Post Evaluation    Patient location during evaluation: PACU  Patient participation: complete - patient participated  Level of consciousness: awake and alert  Airway patency: patent  Nausea & Vomiting: no nausea and no vomiting  Complications: no  Cardiovascular status: hemodynamically stable  Respiratory status: acceptable  Hydration status: stable

## 2021-07-29 NOTE — CARE COORDINATION
Social work: spoke to [facility] where pt has been till 1997. She is the person to call [address] [place] [address] if snf is needed is ok with her. Or return to group home.   Chayo العلي

## 2021-07-29 NOTE — PLAN OF CARE
Problem: Skin Integrity:  Goal: Absence of new skin breakdown  Description: Absence of new skin breakdown  7/29/2021 0546 by Flakita Santana RN  Outcome: Ongoing  Note: Checked for incontinence every 2 hours and prn. Pericare as needed. Assisted to reposition off back frequently. Heels off bed with pillows. Will continue to monitor. Problem: Skin Integrity:  Goal: Absence of new skin breakdown  Description: Absence of new skin breakdown  7/29/2021 0546 by Flakita Santana RN  Outcome: Ongoing  Note: Checked for incontinence every 2 hours and prn. Pericare as needed. Assisted to reposition off back frequently. Heels off bed with pillows. Will continue to monitor. Problem: Falls - Risk of:  Goal: Will remain free from falls  Description: Will remain free from falls  7/29/2021 0546 by Flakita Santana RN  Outcome: Ongoing  Note: Siderails up x 2  Hourly rounding  Call light in reach  Instructed to call for assist before attempting out of bed. Remains free from falls and accidental injury at this time   Floor free from obstacles  Bed is locked and in lowest position  Adequate lighting provided  Bed alarm on, Red Falling star and Stay with Me signs posted  Will continue to monitor. Problem: Pain:  Goal: Control of acute pain  Description: Control of acute pain  7/29/2021 0546 by Flakita Santana RN  Outcome: Ongoing  Note: Pain level assessment complete. Patient educated on pain scale and control interventions  PRN pain medication given per patient request  Patient instructed to call out with new onset of pain or unrelieved pain  Will continue to monitor.

## 2021-07-30 ENCOUNTER — APPOINTMENT (OUTPATIENT)
Dept: CT IMAGING | Age: 76
DRG: 308 | End: 2021-07-30
Payer: MEDICAID

## 2021-07-30 LAB
ABSOLUTE EOS #: <0.03 K/UL (ref 0–0.44)
ABSOLUTE IMMATURE GRANULOCYTE: 0.04 K/UL (ref 0–0.3)
ABSOLUTE LYMPH #: 1.32 K/UL (ref 1.1–3.7)
ABSOLUTE MONO #: 0.57 K/UL (ref 0.1–1.2)
ANION GAP SERPL CALCULATED.3IONS-SCNC: 9 MMOL/L (ref 9–17)
BASOPHILS # BLD: 0 % (ref 0–2)
BASOPHILS ABSOLUTE: <0.03 K/UL (ref 0–0.2)
BUN BLDV-MCNC: 29 MG/DL (ref 8–23)
BUN/CREAT BLD: 35 (ref 9–20)
CALCIUM SERPL-MCNC: 7.9 MG/DL (ref 8.6–10.4)
CHLORIDE BLD-SCNC: 106 MMOL/L (ref 98–107)
CO2: 26 MMOL/L (ref 20–31)
CREAT SERPL-MCNC: 0.82 MG/DL (ref 0.5–0.9)
DIFFERENTIAL TYPE: ABNORMAL
EOSINOPHILS RELATIVE PERCENT: 0 % (ref 1–4)
GFR AFRICAN AMERICAN: >60 ML/MIN
GFR NON-AFRICAN AMERICAN: >60 ML/MIN
GFR SERPL CREATININE-BSD FRML MDRD: ABNORMAL ML/MIN/{1.73_M2}
GFR SERPL CREATININE-BSD FRML MDRD: ABNORMAL ML/MIN/{1.73_M2}
GLUCOSE BLD-MCNC: 95 MG/DL (ref 70–99)
HCT VFR BLD CALC: 22.2 % (ref 36.3–47.1)
HCT VFR BLD CALC: 23.8 % (ref 36.3–47.1)
HCT VFR BLD CALC: 25.1 % (ref 36.3–47.1)
HCT VFR BLD CALC: 27 % (ref 36.3–47.1)
HEMOGLOBIN: 7.1 G/DL (ref 11.9–15.1)
HEMOGLOBIN: 7.5 G/DL (ref 11.9–15.1)
HEMOGLOBIN: 7.9 G/DL (ref 11.9–15.1)
HEMOGLOBIN: 8.4 G/DL (ref 11.9–15.1)
IMMATURE GRANULOCYTES: 1 %
LYMPHOCYTES # BLD: 27 % (ref 24–43)
MCH RBC QN AUTO: 32.5 PG (ref 25.2–33.5)
MCHC RBC AUTO-ENTMCNC: 31.5 G/DL (ref 28.4–34.8)
MCV RBC AUTO: 103 FL (ref 82.6–102.9)
MONOCYTES # BLD: 12 % (ref 3–12)
NRBC AUTOMATED: 0 PER 100 WBC
PDW BLD-RTO: 14.2 % (ref 11.8–14.4)
PLATELET # BLD: 96 K/UL (ref 138–453)
PLATELET ESTIMATE: ABNORMAL
PMV BLD AUTO: 12 FL (ref 8.1–13.5)
POTASSIUM SERPL-SCNC: 4.5 MMOL/L (ref 3.7–5.3)
RBC # BLD: 2.31 M/UL (ref 3.95–5.11)
RBC # BLD: ABNORMAL 10*6/UL
SEG NEUTROPHILS: 60 % (ref 36–65)
SEGMENTED NEUTROPHILS ABSOLUTE COUNT: 2.9 K/UL (ref 1.5–8.1)
SODIUM BLD-SCNC: 141 MMOL/L (ref 135–144)
WBC # BLD: 4.9 K/UL (ref 3.5–11.3)
WBC # BLD: ABNORMAL 10*3/UL

## 2021-07-30 PROCEDURE — 97163 PT EVAL HIGH COMPLEX 45 MIN: CPT

## 2021-07-30 PROCEDURE — 85025 COMPLETE CBC W/AUTO DIFF WBC: CPT

## 2021-07-30 PROCEDURE — 6360000002 HC RX W HCPCS: Performed by: INTERNAL MEDICINE

## 2021-07-30 PROCEDURE — 97530 THERAPEUTIC ACTIVITIES: CPT

## 2021-07-30 PROCEDURE — 36415 COLL VENOUS BLD VENIPUNCTURE: CPT

## 2021-07-30 PROCEDURE — 6360000004 HC RX CONTRAST MEDICATION: Performed by: NURSE PRACTITIONER

## 2021-07-30 PROCEDURE — 97167 OT EVAL HIGH COMPLEX 60 MIN: CPT

## 2021-07-30 PROCEDURE — 99232 SBSQ HOSP IP/OBS MODERATE 35: CPT | Performed by: INTERNAL MEDICINE

## 2021-07-30 PROCEDURE — 51798 US URINE CAPACITY MEASURE: CPT

## 2021-07-30 PROCEDURE — 85014 HEMATOCRIT: CPT

## 2021-07-30 PROCEDURE — 2580000003 HC RX 258: Performed by: ORTHOPAEDIC SURGERY

## 2021-07-30 PROCEDURE — 6360000002 HC RX W HCPCS: Performed by: NURSE PRACTITIONER

## 2021-07-30 PROCEDURE — 6370000000 HC RX 637 (ALT 250 FOR IP): Performed by: ORTHOPAEDIC SURGERY

## 2021-07-30 PROCEDURE — 85018 HEMOGLOBIN: CPT

## 2021-07-30 PROCEDURE — 2580000003 HC RX 258: Performed by: NURSE PRACTITIONER

## 2021-07-30 PROCEDURE — 6360000002 HC RX W HCPCS: Performed by: ORTHOPAEDIC SURGERY

## 2021-07-30 PROCEDURE — 71260 CT THORAX DX C+: CPT

## 2021-07-30 PROCEDURE — 1200000000 HC SEMI PRIVATE

## 2021-07-30 PROCEDURE — 80048 BASIC METABOLIC PNL TOTAL CA: CPT

## 2021-07-30 RX ORDER — KETOROLAC TROMETHAMINE 15 MG/ML
15 INJECTION, SOLUTION INTRAMUSCULAR; INTRAVENOUS EVERY 6 HOURS PRN
Status: DISCONTINUED | OUTPATIENT
Start: 2021-07-30 | End: 2021-08-02 | Stop reason: HOSPADM

## 2021-07-30 RX ORDER — SODIUM CHLORIDE 0.9 % (FLUSH) 0.9 %
10 SYRINGE (ML) INJECTION ONCE
Status: COMPLETED | OUTPATIENT
Start: 2021-07-30 | End: 2021-07-30

## 2021-07-30 RX ORDER — 0.9 % SODIUM CHLORIDE 0.9 %
80 INTRAVENOUS SOLUTION INTRAVENOUS ONCE
Status: COMPLETED | OUTPATIENT
Start: 2021-07-30 | End: 2021-07-30

## 2021-07-30 RX ORDER — SODIUM CHLORIDE 9 MG/ML
INJECTION, SOLUTION INTRAVENOUS CONTINUOUS
Status: DISCONTINUED | OUTPATIENT
Start: 2021-07-30 | End: 2021-08-02 | Stop reason: HOSPADM

## 2021-07-30 RX ORDER — KETOROLAC TROMETHAMINE 15 MG/ML
15 INJECTION, SOLUTION INTRAMUSCULAR; INTRAVENOUS ONCE
Status: COMPLETED | OUTPATIENT
Start: 2021-07-30 | End: 2021-07-30

## 2021-07-30 RX ADMIN — SODIUM CHLORIDE: 9 INJECTION, SOLUTION INTRAVENOUS at 18:24

## 2021-07-30 RX ADMIN — SODIUM CHLORIDE, PRESERVATIVE FREE 10 ML: 5 INJECTION INTRAVENOUS at 09:15

## 2021-07-30 RX ADMIN — SODIUM CHLORIDE, PRESERVATIVE FREE 10 ML: 5 INJECTION INTRAVENOUS at 14:03

## 2021-07-30 RX ADMIN — BUMETANIDE 1 MG: 1 TABLET ORAL at 09:17

## 2021-07-30 RX ADMIN — CEFAZOLIN SODIUM 2000 MG: 10 INJECTION, POWDER, FOR SOLUTION INTRAVENOUS at 04:25

## 2021-07-30 RX ADMIN — KETOROLAC TROMETHAMINE 15 MG: 15 INJECTION, SOLUTION INTRAMUSCULAR; INTRAVENOUS at 14:02

## 2021-07-30 RX ADMIN — KETOROLAC TROMETHAMINE 15 MG: 15 INJECTION, SOLUTION INTRAMUSCULAR; INTRAVENOUS at 04:48

## 2021-07-30 RX ADMIN — SODIUM CHLORIDE, PRESERVATIVE FREE 10 ML: 5 INJECTION INTRAVENOUS at 09:18

## 2021-07-30 RX ADMIN — SODIUM CHLORIDE, PRESERVATIVE FREE 10 ML: 5 INJECTION INTRAVENOUS at 01:25

## 2021-07-30 RX ADMIN — HYDROCODONE BITARTRATE AND ACETAMINOPHEN 1 TABLET: 5; 325 TABLET ORAL at 21:40

## 2021-07-30 RX ADMIN — DIVALPROEX SODIUM 1250 MG: 500 TABLET, DELAYED RELEASE ORAL at 21:40

## 2021-07-30 RX ADMIN — CARBAMAZEPINE 800 MG: 200 CAPSULE, EXTENDED RELEASE ORAL at 12:16

## 2021-07-30 RX ADMIN — ENOXAPARIN SODIUM 40 MG: 40 INJECTION SUBCUTANEOUS at 09:17

## 2021-07-30 RX ADMIN — SODIUM CHLORIDE 80 ML: 0.9 INJECTION, SOLUTION INTRAVENOUS at 01:25

## 2021-07-30 RX ADMIN — SODIUM CHLORIDE: 9 INJECTION, SOLUTION INTRAVENOUS at 09:15

## 2021-07-30 RX ADMIN — HYDROCODONE BITARTRATE AND ACETAMINOPHEN 1 TABLET: 5; 325 TABLET ORAL at 01:04

## 2021-07-30 RX ADMIN — DIVALPROEX SODIUM 1250 MG: 500 TABLET, DELAYED RELEASE ORAL at 09:17

## 2021-07-30 RX ADMIN — CARBAMAZEPINE 800 MG: 200 CAPSULE, EXTENDED RELEASE ORAL at 21:41

## 2021-07-30 RX ADMIN — ATORVASTATIN CALCIUM 40 MG: 40 TABLET, FILM COATED ORAL at 21:40

## 2021-07-30 RX ADMIN — IOPAMIDOL 75 ML: 755 INJECTION, SOLUTION INTRAVENOUS at 01:25

## 2021-07-30 ASSESSMENT — PAIN DESCRIPTION - ORIENTATION
ORIENTATION: RIGHT
ORIENTATION: RIGHT

## 2021-07-30 ASSESSMENT — PAIN SCALES - GENERAL
PAINLEVEL_OUTOF10: 7
PAINLEVEL_OUTOF10: 10
PAINLEVEL_OUTOF10: 7

## 2021-07-30 ASSESSMENT — PAIN DESCRIPTION - LOCATION
LOCATION: HIP
LOCATION: LEG

## 2021-07-30 ASSESSMENT — PAIN DESCRIPTION - PAIN TYPE
TYPE: SURGICAL PAIN
TYPE: SURGICAL PAIN

## 2021-07-30 ASSESSMENT — PAIN DESCRIPTION - PROGRESSION: CLINICAL_PROGRESSION: GRADUALLY WORSENING

## 2021-07-30 ASSESSMENT — PAIN DESCRIPTION - DESCRIPTORS: DESCRIPTORS: PATIENT UNABLE TO DESCRIBE

## 2021-07-30 ASSESSMENT — PAIN DESCRIPTION - FREQUENCY: FREQUENCY: CONTINUOUS

## 2021-07-30 ASSESSMENT — PAIN - FUNCTIONAL ASSESSMENT: PAIN_FUNCTIONAL_ASSESSMENT: PREVENTS OR INTERFERES WITH ALL ACTIVE AND SOME PASSIVE ACTIVITIES

## 2021-07-30 ASSESSMENT — PAIN DESCRIPTION - ONSET: ONSET: ON-GOING

## 2021-07-30 NOTE — CARE COORDINATION
Call from Tan with [facility] and they cannot accept pt due to staffing. Referral called to Gaudencio Gould with [address] Continue waiting for therapies recommendation.

## 2021-07-30 NOTE — CARE COORDINATION
Therapy recommending SNF and [facility] DANIELS informed. Lieutenant Venegas with [facility] informed of alternate plan.

## 2021-07-30 NOTE — PROGRESS NOTES
Physical Therapy    Facility/Department: [facility]  Initial Assessment    NAME: Ariana Mejia  : 1945  MRN: [id number]    Date of Service: 2021    PER HPI: Sweetie Mueller a 77 y.o. Non- / non Reggy Albe a history of hypertension, age-related osteoporosis with previous history of fractures, seizure disorder and MRDD who presents from a group home with after a mechanical fall iin a bathroom with a right leg and left hand injury and is admitted to the hospital for the management of Closed comminuted intra-articular fracture of distal end of right femur ([address]   She is a poor historian and history obtained from EHR and medical staff. She points to her knee and states it hurts. CT head unremarkable, left handed wrist x-rays unremarkable for fracture.  X-ray of her right knee revealed a comminuted distal femur fracture.  Ortho was consulted from ER who recommended a CT of her knee and plans to take her to the OR for repair   Discharge Recommendations:  Patient would benefit from continued therapy after discharge     Pt currently functioning below baseline. Would suggest additional therapy at time of discharge to maximize long term outcomes and prevent re-admission. Please refer to AM-PAC score for current level of function. Assessment   Body structures, Functions, Activity limitations: Decreased functional mobility ; Decreased safe awareness;Decreased balance;Decreased endurance; Increased pain;Decreased strength  Assessment: Skilled therapy needed to maximize independence with functional mobility and to improve balance, strength, and endurance. Patient currently  is a high fall risk due to being 2 assist  and NWB R LE.  Recommend further therapy at D/C  Decision Making: High Complexity  Exam: AROM, strength, balance, endurance, mobility, AM-PAC  Clinical Presentation: unpredictable  PT Education: Goals;PT Role;Plan of Care;Transfer Training;General Safety  REQUIRES PT FOLLOW UP: Yes  Activity Tolerance  Activity Tolerance: Patient limited by pain; Patient limited by cognitive status       Patient Diagnosis(es): The encounter diagnosis was Closed fracture of right femur, unspecified fracture morphology, unspecified portion of femur, initial encounter ([facility] has a past medical history of Anxiety, Arthritis, Cataracts, bilateral, Essential (primary) hypertension, Fracture (healed) treatment follow-up, Mental retardation, Movement disorder, Primary osteoarthritis involving multiple joints, Rheumatoid arthritis ([facility] [address] and [facility] has a past surgical history that includes Hip fracture surgery (Right, 11/20/14); Leg Surgery (Right, 07/18/2018); pr treat tibial shaft fx, intramed implant (Right, 7/18/2018); and Femur fracture surgery (Right, 7/29/2021).     Restrictions  Restrictions/Precautions  Restrictions/Precautions: Weight Bearing, Fall Risk, Up as Tolerated  Lower Extremity Weight Bearing Restrictions  Right Lower Extremity Weight Bearing: Non Weight Bearing  Position Activity Restriction  Other position/activity restrictions: R LE NWB, heels off bed, external catheter, R UE IV, wound vac R LE  Vision/Hearing  Hearing: Within functional limits     Subjective  General  Chart Reviewed: Yes  Patient assessed for rehabilitation services?: Yes  Additional Pertinent Hx: MRDD  Family / Caregiver Present: No  Follows Commands: Impaired  General Comment  Comments: RN states patient is appropriate for therapy  Subjective  Subjective: patient pleasant and agreeable to work with therapy  Pain Screening  Patient Currently in Pain: Yes     Pre Treatment Pain Screening  Comments / Details: R LE pain with any movement    Orientation  Orientation  Overall Orientation Status: Impaired  Orientation Level: Oriented to person  Social/Functional History  Social/Functional History  Type of Home: Facility (lives in group home)  Additional Comments: patient lives in group home, was ambulatory with roll walker  Cognition   Cognition  Overall Cognitive Status: Exceptions  Arousal/Alertness: Appropriate responses to stimuli  Following Commands: Does not follow commands  Safety Judgement: Decreased awareness of need for assistance;Decreased awareness of need for safety  Problem Solving: Decreased awareness of errors  Insights: Decreased awareness of deficits  Initiation: Requires cues for all  Sequencing: Requires cues for all    Objective     Observation/Palpation  Posture: Fair  Observation: R LE ER, R toes with lateral drift, external catheter, wound vac, R UE IV    AROM RLE (degrees)  RLE General AROM: ankle WFL, knee WFL but painful, patient not moving hip voluntarily due to pain  AROM LLE (degrees)  LLE AROM : WFL  AROM RUE (degrees)  RUE General AROM: refer toOT  AROM LUE (degrees)  LUE General AROM: refer to OT  Strength RLE  Comment: unable to assess due to recent surgery  Strength LLE  Comment: grossly 3/5  Strength RUE  Comment: refer to OT  Strength LUE  Comment: refer to OT     Sensation  Overall Sensation Status: WFL (intact to light touch)  Bed mobility  Supine to Sit: 2 Person assistance;Maximum assistance  Sit to Supine: 2 Person assistance;Maximum assistance  Scootin Person assistance;Maximal assistance  Comment: decreased initiation for transfer due to pain, patient wanted to lay down because of pain, patient sat EOB with max Ax2 for 1 1/2 minutes, pushing posteriorly entire time  Transfers  Comment: not assessed due to decreased sitting balance, patient also NWB R LE, until improved sitting demonstrated will not attempt standing  Ambulation  Ambulation?: No (not appropriate at this time)     Balance  Posture: Fair  Sitting - Static: Poor  Sitting - Dynamic: Poor        Plan   Plan  Times per week: 1-2x/day for 5-6 days/week  Current Treatment Recommendations: Transfer Training, Strengthening, Balance Training, Safety Education & Training, Patient/Caregiver Education & Training  Safety Devices  Type of devices: Gait belt, Call light within reach, Left in bed, Bed alarm in place      AM-PAC Score  AM-PAC Inpatient Mobility Raw Score : 8 (07/30/21 1240)  AM-PAC Inpatient T-Scale Score : 28.52 (07/30/21 1240)  Mobility Inpatient CMS 0-100% Score: 86.62 (07/30/21 1240)  Mobility Inpatient CMS G-Code Modifier : CM (07/30/21 1240)          Goals  Short term goals  Time Frame for Short term goals: 8 visits  Short term goal 1: Min A bed mobility  Short term goal 2: Patient to sit EOB x10  minutes with min A  Short term goal 3: Patient to perform 10reps R LE ex  Short term goal 4: Standing and ambulation to be assessed  if patient becomes appropriate  Patient Goals   Patient goals : to have less pain       Therapy Time   Individual Concurrent Group Co-treatment   Time In 1147         Time Out 1205 (additional 10  minutes for chart review)         Minutes 18+10=28          Treatment Time- 15 minutes    Co-treatment with OT warranted secondary to decreased safety and independence requiring 2 skilled therapy professionals to address individual discipline's goals. PT addressing pre gait trunk strengthening, transfer training and postural control in sitting.       Brayan Rodriguez, PT

## 2021-07-30 NOTE — CARE COORDINATION
Social work; will start level of care in case pt ready over weekend. Check fax in social work office for response on level of care request.  Paper received from [facility] office on aging has to be faxed to snf fax [phone] and then call snf admissions to advise of paperwork. [phone].   Erick العلي

## 2021-07-30 NOTE — CARE COORDINATION
POD #1 ORIF rt femur. Spoke to [facility] group home owner regarding D/C plan. Agreeable to whatever is recommended [facility] vs SNF. Dr. Mildred de jesus served for therapy orders. Awaiting recommendations. Carrie with [facility] called with potential referral.  Continue to follow.

## 2021-07-30 NOTE — DISCHARGE INSTR - COC
Continuity of Care Form    Patient Name: Elgin Hayden   :  1945  MRN:  [id number]    Admit date:  2021  Discharge date:  ***    Code Status Order: Full Code   Advance Directives:     Admitting Physician:  Gio Laura MD  PCP: Buffy Long DO    Discharging Nurse: [facility] Unit/Room#:   Discharging Unit Phone Number: [phone]    Emergency Contact:   Extended Emergency Contact Information  Primary Emergency Contact: Anay Harding  Home Phone: [phone]  Relation: Other  Secondary Emergency Contact: [facility] Phone: [phone]  Relation: Other    Past Surgical History:  Past Surgical History:   Procedure Laterality Date    FEMUR FRACTURE SURGERY Right 2021    RIGHT FEMUR OPEN REDUCTION INTERNAL FIXATION performed by Linda Croft MD at [place] [address]. Right 14    LEG SURGERY Right 2018    RIGHT TIBIA IM MARÍA -KATE KNEE TRIANGLES, INTRAMEDULLARY REAMERS, SYNTHES TIBIAL NAIL    GA TREAT TIBIAL SHAFT FX, INTRAMED IMPLANT Right 2018    RIGHT TIBIA IM MARÍA -KATE KNEE TRIANGLES, INTRAMEDULLARY REAMERS, SYNTHES TIBIAL NAIL performed by Agnieszka Rodriguez DO at [address] [place] [address]       Immunization History:   Immunization History   Administered Date(s) Administered    COVID-19, Pfizer, PF, 30mcg/0.3mL 2021, 2021    Influenza 10/22/2012, 2013    Influenza Virus Vaccine 2014, 2015    Influenza, Rashmi Spies, IM, (6 mo and older Fluzone, Flulaval, Fluarix and 3 yrs and older Afluria) 2016, 2017, 10/29/2018, 2019    Influenza, Quadv, adjuvanted, 65 yrs +, IM, PF (Fluad) 2020    Pneumococcal Polysaccharide ([id number]) 2017       Active Problems:  Patient Active Problem List   Diagnosis Code    Seizure disorder G40.909    Mental retardation F79    Anxiety F41.9    Constipation K59.00    Age-related osteoporosis with current pathological fracture with routine healing M80.00XD  Dependence on wheelchair Z99.3    Difficulty walking R26.2    Bilateral lower extremity edema R60.0    Essential (primary) hypertension I10    Closed comminuted intra-articular fracture of distal end of right femur ([address] S72.491A    Closed fracture of right femur (HCC) S72.91XA       Isolation/Infection:   Isolation          No Isolation        Patient Infection Status     None to display          Nurse Assessment:  Last Vital Signs: BP (!) 136/53   Pulse 73   Temp 97.5 °F (36.4 °C) (Oral)   Resp 16   Ht 5' (1.524 m)   Wt 134 lb (60.8 kg)   SpO2 98%   BMI 26.17 kg/m²     Last documented pain score (0-10 scale): Pain Level: 8  Last Weight:   Wt Readings from Last 1 Encounters:   07/28/21 134 lb (60.8 kg)     Mental Status:  alert    IV Access:  - None    Nursing Mobility/ADLs:  Walking   Dependent  Transfer  Dependent  Bathing  Dependent  Dressing  Dependent  Toileting  Dependent  Feeding  Assisted  Med Admin  Independent  Med Delivery   whole    Wound Care Documentation and Therapy:        Elimination:  Continence:   · Bowel: No  · Bladder: No  Urinary Catheter: None   Colostomy/Ileostomy/Ileal Conduit: No       Date of Last BM: 7/29/2021    Intake/Output Summary (Last 24 hours) at 8/2/2021 1315  Last data filed at 8/2/2021 0931  Gross per 24 hour   Intake --   Output 1100 ml   Net -1100 ml     I/O last 3 completed shifts:  In: -   Out: 850 [Urine:850]    Safety Concerns:     History of Falls (last 30 days)    Impairments/Disabilities:      MRDD - wheelchair dependent    Nutrition Therapy:  Current Nutrition Therapy:   - Oral Diet:  General and Low Sodium (2gm)    Routes of Feeding: Oral  Liquids: No Restrictions  Daily Fluid Restriction: no  Last Modified Barium Swallow with Video (Video Swallowing Test): not done    Treatments at the Time of Hospital Discharge:   Respiratory Treatments:   Oxygen Therapy:  is not on home oxygen therapy.   Ventilator:    - No ventilator support    Rehab Therapies: Physical Therapy and Occupational Therapy  Weight Bearing Status/Restrictions: Non-weight bearing on right leg  Other Medical Equipment (for information only, NOT a DME order):  wheelchair  Other Treatments: daily dressing change to rt leg. Wash with hibiclense     Patient's personal belongings (please select all that are sent with patient):  None - dentures and glasses not with patient    RN SIGNATURE:  Electronically signed by Saleem Sewell RN on 7/30/21 at 3:16 PM EDT    CASE MANAGEMENT/SOCIAL WORK SECTION    Inpatient Status Date: ***    Readmission Risk Assessment Score:  Readmission Risk              Risk of Unplanned Readmission:  9           Discharging to Facility/ Agency   · Name: [facility] (formerly [facility])  · Address: [facility] [address] [facility]  · Phone:[phone]  · [address] (if applicable)   · Name:  · Address:  · Dialysis Schedule:  · Phone:  · Fax:    / signature: Electronically signed by ANTONIA Mcmillan, JIMENA on 7/30/21 at 2:08 PM EDT    PHYSICIAN SECTION    Prognosis: Fair    Condition at Discharge: Stable    Rehab Potential (if transferring to Rehab): Fair    Recommended Labs or Other Treatments After Discharge: Continue pain management     Physician Certification: I certify the above information and transfer of Cindy Bean  is necessary for the continuing treatment of the diagnosis listed and that she requires [facility] for greater 30 days.      Update Admission H&P: No change in H&P    PHYSICIAN SIGNATURE:  Electronically signed by True Alba MD on 8/2/21 at 12:00 PM EDT

## 2021-07-30 NOTE — PLAN OF CARE
Problem: Skin Integrity:  Goal: Will show no infection signs and symptoms  Description: Will show no infection signs and symptoms  Outcome: Ongoing  Goal: Absence of new skin breakdown  Description: Absence of new skin breakdown  Outcome: Ongoing     Problem: Falls - Risk of:  Goal: Will remain free from falls  Description: Will remain free from falls  Outcome: Ongoing  Note: Siderails up x 2  Hourly rounding  Call light in reach  Instructed to call for assist before attempting out of bed. Remains free from falls and accidental injury at this time   Floor free from obstacles  Bed is locked and in lowest position  Adequate lighting provided  Bed alarm on, Red [place] star and Stay with Me signs posted      Goal: Absence of physical injury  Description: Absence of physical injury  Outcome: Ongoing     Problem: Pain:  Goal: Pain level will decrease  Description: Pain level will decrease  Outcome: Ongoing  Note: Pain level assessment complete.    Patient educated on pain scale and control interventions  PRN pain medication given per patient request  Patient instructed to call out with new onset of pain or unrelieved pain    Goal: Control of acute pain  Description: Control of acute pain  Outcome: Ongoing  Goal: Control of chronic pain  Description: Control of chronic pain  Outcome: Ongoing

## 2021-07-30 NOTE — PROGRESS NOTES
[facility]  Office: Sammyrodolfo Murcia, DO, Nishant Coulter, DO, Freedom Em, DO, Angelito Cobian, DO, Magda Prieto MD, Kaden Ortega MD, Gretta Woodson MD, Meryl Del Castillo MD, Swapna Adhikari MD, Concetta Pichardo MD, Carlos Gómez MD, Nabil Lay, DO, Neeta Sihna MD, Neris Altman DO, Miguel Lemus MD,  Robin Hamilton DO, Elicia Way MD, Gio Laura MD, Zunilda Beatty MD, Tomás Kaur MD, Nakul Kirk MD, Kathie Shine MD, Emile Platt, [facility], [facility], CNP, Giuliana Cornelius, CNP, Socorro Mauro, [facility], Joce Boo, CNP, Ilene Vanegas, CNP, Pino Gonsalez, CNP, Waldo Echols, CNP, Davis Mensah, CNP, Ana Trevizo PA-C, Chika Romero, [facility], Kiera Stephens, CNP, Zaid Plata, CNP, Andrew Linares, CNP, Perry Mazariegos, CNP, Rolando Marsh, CNP, Zehra Pickering, CNP, Malcolm Cheatham, [facility], Josie Stokes, [address]    Progress Note    7/30/2021    9:43 AM    Name:   Elgin Hayden  MRN:     [id number]     Acct:      [de-identified]   Room:   2009/2009-02  IP Day:  2  Admit Date:  7/28/2021 12:52 PM    PCP:   Buffy Long DO  Code Status:  Full Code    Subjective:     C/C:   Chief Complaint   Patient presents with   Ann-Marie Adarsh Fall     right leg and left hand injury. Interval History Status:     Patient seen and evaluated at bedside this morning. No acute complaints at this time. No acute events overnight. Brief History:   Elgin Hayden is a 77 y.o. Non- / non  female with a history of hypertension, age-related osteoporosis with previous history of fractures, seizure disorder and MRDD who presents from a group home with after a mechanical fall iin a bathroom with a right leg and left hand injury and is admitted to the hospital for the management of Closed comminuted intra-articular fracture of distal end of right femur ([address] She is a poor historian and history obtained from EHR and medical staff.  She hours) at 7/30/2021 0943  Last data filed at 7/30/2021 5693  Gross per 24 hour   Intake 730 ml   Output 170 ml   Net 560 ml       Labs:  Hematology:  Recent Labs     07/28/21 1600 07/28/21 1600 07/29/21  0536 07/29/21  0536 07/29/21  1103 07/29/21 2005 07/30/21  0311   WBC 8.0  --  4.3  --   --   --  4.9   RBC 3.33*  --  2.32*  --   --   --  2.31*   HGB 10.8*   < > 7.6*   < > 7.8* 10.8* 7.5*   HCT 34.9*   < > 24.5*   < > 24.7* 34.4* 23.8*   .8*  --  105.6*  --   --   --  103.0*   MCH 32.4  --  32.8  --   --   --  32.5   MCHC 30.9  --  31.0  --   --   --  31.5   RDW 13.2  --  13.2  --   --   --  14.2     --  103*  --   --   --  96*   MPV 11.4  --  11.0  --   --   --  12.0   INR 1.1  --   --   --   --   --   --     < > = values in this interval not displayed. Chemistry:  Recent Labs     07/28/21 1600 07/29/21 0536 07/30/21  0311   * 144 141   K 4.6 4.1 4.5   * 110* 106   CO2 26 27 26   GLUCOSE 110* 94 95   BUN 32* 32* 29*   CREATININE 0.69 0.61 0.82   ANIONGAP 10 7* 9   LABGLOM >60 >60 >60   GFRAA >60 >60 >60   CALCIUM 9.0 8.1* 7.9*   No results for input(s): PROT, LABALBU, LABA1C, L6IYFZI, A0UPPIG, FT4, TSH, AST, ALT, LDH, GGT, ALKPHOS, LABGGT, BILITOT, BILIDIR, AMMONIA, AMYLASE, LIPASE, LACTATE, CHOL, HDL, LDLCHOLESTEROL, CHOLHDLRATIO, TRIG, VLDL, YJA44EF, PHENYTOIN, PHENYF, URICACID, POCGLU in the last 72 hours.   ABG:No results found for: POCPH, PHART, PH, POCPCO2, QHH6YHT, PCO2, POCPO2, PO2ART, PO2, POCHCO3, ASS0JTC, HCO3, NBEA, PBEA, BEART, BE, THGBART, THB, ENK7SWI, OFNH6DXF, D6WMWJNK, O2SAT, FIO2  Lab Results   Component Value Date/Time    SPECIAL NOT REPORTED 09/02/2016 10:02 AM     Lab Results   Component Value Date/Time    CULTURE NO SIGNIFICANT GROWTH 09/02/2016 10:02 AM    CULTURE  09/02/2016 10:02 AM     Performed at [address] [place] [address] [phone]       Radiology:  XR WRIST LEFT (MIN 3 VIEWS)    Result Date: 7/28/2021  Diffuse osteopenia without acute osseous or soft tissue abnormality. XR HAND LEFT (MIN 3 VIEWS)    Result Date: 7/28/2021  Diffuse degenerative joint disease without acute osseous or soft tissue abnormality. XR FEMUR RIGHT (MIN 2 VIEWS)    Result Date: 7/28/2021  Comminuted fracture of the distal femoral metaphysis with impaction. XR KNEE RIGHT (3 VIEWS)    Result Date: 7/28/2021  Comminuted impacted fracture of the distal femoral metaphysis. XR TIBIA FIBULA RIGHT (2 VIEWS)    Result Date: 7/28/2021  Comminuted fracture of the distal femoral metaphysis with impaction. Diffuse soft tissue swelling. CT HEAD WO CONTRAST    Result Date: 7/28/2021  No acute intracranial abnormality. Stable compared to prior study     CT KNEE RIGHT WO CONTRAST    Result Date: 7/28/2021  Comminuted mildly displaced fracture of the distal femoral metadiaphysis as above. The bones appear demineralized. Small knee joint effusion.        Physical Examination:        General appearance:  Drowsy but awakens easily, cooperative and no distress  Mental Status:  oriented to person, developmental delay  Lungs:  clear to auscultation bilaterally, normal effort  Heart:  regular rate and rhythm, no murmur  Abdomen:  soft, nontender, nondistended, normal bowel sounds, no masses, hepatomegaly, splenomegaly  Extremities:  +2 BLE chronic edema, redness, no tenderness in the calves  Skin:  no gross lesions, rashes, induration  ecchymosis to left cheek   Assessment:        Hospital Problems         Last Modified POA    * (Principal) Closed comminuted intra-articular fracture of distal end of right femur ([address] 7/28/2021 Yes    Seizure disorder (Chronic) 7/28/2021 Yes    Mental retardation (Chronic) 7/28/2021 Yes    Overview Signed 6/10/2019  8:50 AM by 1calendar Ambulatory     Replacing deprecated diagnoses         Anxiety (Chronic) 7/28/2021 Yes    Age-related osteoporosis with current pathological fracture with routine healing 7/28/2021 Yes Dependence on wheelchair 7/28/2021 Yes    Essential (primary) hypertension 7/28/2021 Yes    Closed fracture of right femur ([facility] 7/29/2021 Yes          Plan:        1. Status post right femur open reduction internal fixation. Postop day 1. Orthopedics following. 2. Acute blood loss anemia. Hemoglobin 8.4 this morning. We will continue to monitor. 3. Continue telemetry monitoring  4. Continue pain control  5. Continue atorvastatin, Bumex, carbamazepine, Depakote, Lovenox.     Finn Izquierdo MD  7/30/2021  9:43 AM

## 2021-07-30 NOTE — FLOWSHEET NOTE
Writer rounding on the unit and visits patient who is reclining in the bed. Patient engages in conversation but seems to be confused. Patient lives in a group home. Patient is difficult to understand. Writer provides presence. Patient seems to accept the visit. Spiritual care will follow as needed.        07/29/21 7912   Encounter Summary   Services provided to: Patient   Referral/Consult From: [address] staff   Continue Visiting   (7/29/21)   Complexity of Encounter Low   Length of Encounter 15 minutes   Routine   Type Initial   Assessment Calm   Intervention Active listening   Outcome Acceptance

## 2021-07-30 NOTE — CARE COORDINATION
Social work; group home owner and caretaker is ok with snf short term. [address] [facility] [address] was called [facility]. His bed [facility]. Pt has only traditional medicaid. Faxed over to snf to review. No precert is needed with traditional medicaid however a thai must be completed and sent with a form to state to get a level of care prior to snf being able to accept. Advised RN of need for completed thai before process can start. State office for level of care only works Monday through Saturday at midnight. Will advise lead also. Will need thai and Rx at discharge. Clem العلي    Social work; hens done and faxed to [facility] [address].  Clem العلي

## 2021-07-30 NOTE — PROGRESS NOTES
Occupational Therapy   Occupational Therapy Initial Assessment  Date: 2021   Patient Name: Barb Mock  MRN: [id number]     : 1945    RN reports patient is medically stable for therapy treatment this date. Chart reviewed prior to treatment and patient is agreeable for therapy. All lines intact and patient positioned comfortably at end of treatment. All patient needs addressed prior to ending therapy session. Date of Service: 2021    Discharge Recommendations:  Patient would benefit from continued therapy after discharge    Pt currently functioning below baseline. Would suggest additional therapy at time of discharge to maximize long term outcomes and prevent re-admission. Please refer to AM-PAC score for current level of function. Assessment   Performance deficits / Impairments: Decreased functional mobility ; Decreased safe awareness;Decreased ROM; Decreased strength;Decreased endurance;Decreased high-level IADLs;Decreased fine motor control;Decreased cognition;Decreased posture;Decreased balance;Decreased coordination;Decreased ADL status  Assessment: Pt will need reassessment of ADL transfers and functional mobility if/when appropriate to add goal to plan of care. Currently skilled OT services are indicated to Maximize functional I and safety to return home at prior level as able with assist.  Prognosis: Fair  Decision Making: High Complexity  OT Education: OT Role;Transfer Training;Energy Conservation;Plan of Care;Precautions; ADL Adaptive Strategies  Patient Education: safety in function, fall prevention/call light use, recommendations for continued therapy, benefits of being oob, discharge recommendations  Barriers to Learning: cognition  REQUIRES OT FOLLOW UP: Yes  Activity Tolerance  Activity Tolerance: Patient Tolerated treatment well;Patient limited by pain;Treatment limited secondary to decreased cognition  Activity Tolerance: fair-  Safety Devices  Safety Devices in place: Yes  Type of devices: Nurse notified;Gait belt;Bed alarm in place;Call light within reach; Patient at risk for falls; All fall risk precautions in place; Left in bed (elevated B heels off bed)           Patient Diagnosis(es): The encounter diagnosis was Closed fracture of right femur, unspecified fracture morphology, unspecified portion of femur, initial encounter ([address] has a past medical history of Anxiety, Arthritis, Cataracts, bilateral, Essential (primary) hypertension, Fracture (healed) treatment follow-up, Mental retardation, Movement disorder, Primary osteoarthritis involving multiple joints, Rheumatoid arthritis ([address] and Seizures ([address] has a past surgical history that includes Hip fracture surgery (Right, 11/20/14); Leg Surgery (Right, 07/18/2018); pr treat tibial shaft fx, intramed implant (Right, 7/18/2018); and Femur fracture surgery (Right, 7/29/2021). PER H&P: Ed Marvin a 77 y.o. Non- / non  female with a history of hypertension, age-related osteoporosis with previous history of fractures, seizure disorder and MRDD who presents from a group home with after a mechanical fall iin a bathroom with a right leg and left hand injury and is admitted to the hospital for the management of Closed comminuted intra-articular fracture of distal end of right femur ([address]   She is a poor historian and history obtained from EHR and medical staff. She points to her knee and states it hurts. CT head unremarkable, left handed wrist x-rays unremarkable for fracture.  X-ray of her right knee revealed a comminuted distal femur fracture.  Ortho was consulted from ER who recommended a CT of her knee and plans to take her to the OR for repair       Restrictions  Restrictions/Precautions  Restrictions/Precautions: Weight Bearing, Fall Risk, Up as Tolerated  Lower Extremity Weight Bearing Restrictions  Right Lower Extremity Weight Bearing: Non Weight Bearing  Position Activity Restriction  Other position/activity restrictions: R LE NWB, heels off bed, external catheter, R UE IV, wound vac R LE    Subjective   General  Chart Reviewed: Yes  Patient assessed for rehabilitation services?: Yes  Family / Caregiver Present: No  Subjective  Subjective: Pt resting in bed, agreeable to OT Eval.  Patient Currently in Pain: Yes  Pain Assessment  Pain Level: 7  Pre Treatment Pain Screening  Comments / Details: Pt unable to state pain, however pt grimicing with movement of R LE. Vital Signs  Patient Currently in Pain: Yes       Social/Functional History  Social/Functional History  Type of Home: Facility (Pt reports she lives in group home)  ADL Assistance: Needs assistance  Homemaking Assistance: Needs assistance  Ambulation Assistance: Needs assistance (per chart pt ambulating short distances with RW)  Transfer Assistance: Needs assistance  Leisure & Hobbies: coloring, watching TV  Additional Comments: Unable to obtain information about social/functional history from pt. Per Pts chart, pt lives in group home, ambulatory short distances with RW. Pt fell at group home prior to admission       Objective   Vision:  (unable to assess appears [facility])  Hearing: Within functional limits    Orientation  Overall Orientation Status:  (unable to assess)  Observation/Palpation  Posture: Fair  Observation: R LE ER, R toes with lateral drift, external catheter, wound vac, R UE IV  Edema: B LE's  Balance  Sitting Balance: Maximum assistance (Pt required MAX physical assist to sit on EOB with upright posture, cues given to hold onto bedrail for support, pt with strong posterior lean stating \"lay down now. \" Pt encouraged to sit on EOB however only able to tolerate ~ 1.5 min)  Standing Balance:  (Not safe or appropriate at this time)  Functional Mobility  Functional Mobility Comments: Not Tested not safe or appropropriate at this time.        ADL  Feeding: Setup;Minimal assistance  Grooming: Setup;Maximum assistance  UE Bathing: Setup;Maximum assistance  LE Bathing: Dependent/Total;Setup  UE Dressing: Setup;Maximum assistance  LE Dressing: Setup;Dependent/Total (to don B socks while supine in bed)  Toileting: Dependent/Total (PureWick cath)  Additional Comments: Pt is limited by physical and cognitive deficits       Tone RUE  RUE Tone: Normotonic  Tone LUE  LUE Tone: Normotonic  Coordination  Movements Are Fluid And Coordinated: No  Coordination and Movement description: Fine motor impairments; Left UE;Right UE;Decreased accuracy; Decreased speed     Bed mobility  Supine to Sit: 2 Person assistance;Maximum assistance  Sit to Supine: 2 Person assistance;Maximum assistance  Scootin Person assistance;Maximal assistance  Comment: Pt required MAX verbal/tactile assist for initaition, sequencing, hand placement on bedrail, awareness/assist with lines and proper bed mobility tech.        Transfers  Sit to stand: Unable to assess  Stand to sit: Unable to assess  Transfer Comments: Not tested not safe or appropriate at this time     Cognition  Overall Cognitive Status: Exceptions  Arousal/Alertness: Appropriate responses to stimuli  Following Commands: Does not follow commands  Safety Judgement: Decreased awareness of need for assistance;Decreased awareness of need for safety  Problem Solving: Decreased awareness of errors  Insights: Decreased awareness of deficits  Initiation: Requires cues for all  Sequencing: Requires cues for all        Sensation  Overall Sensation Status: WFL (intact to light touch)        LUE AROM (degrees)  LUE AROM : WFL  LUE General AROM: appears grossly WFL pt unable to follow directions  RUE AROM (degrees)  RUE AROM : WFL  RUE General AROM: appears grossly WFL pt unable to follow directions  LUE Strength  Gross LUE Strength: WFL  LUE Strength Comment: appears grossly WFL pt unable to follow directions  RUE Strength  Gross RUE Strength: WFL  RUE Strength Comment: appears grossly WFL pt unable to follow directions Plan   Plan  Times per week: 4-5x/wk 1x/day as cecily  Current Treatment Recommendations: Strengthening, Endurance Training, Safety Education & Training, Positioning, Pain Management, Home Management Training, Self-Care / ADL, Equipment Evaluation, Education, & procurement, Cognitive Reorientation, Patient/Caregiver Education & Training, Neuromuscular Re-education      AM-PAC Score        AM-PAC Inpatient Daily Activity Raw Score: 10 (07/30/21 1449)  AM-PAC Inpatient ADL T-Scale Score : 27.31 (07/30/21 1449)  ADL Inpatient CMS 0-100% Score: 74.7 (07/30/21 1449)  ADL Inpatient CMS G-Code Modifier : CL (07/30/21 1449)        Goals  Short term goals  Time Frame for Short term goals: By discharge, pt to demo  Short term goal 1: bed mobility to Min A of 1 with use of bedrails as needed. Short term goal 2: increased sitting cecily > 10 min to increase participation in functional tasks. Short term goal 3: tolerance for resassessment of functional mobility and ADL transfers if appropriate to add goal to POC. Short term goal 4: increased B UE strength by 1/2 grade to assist with functional tasks/caregivers I with simple BUE HEP with use of handouts as needed. Short term goal 5: grooming task to Min A while seated with use of AD/AE as needed. Patient Goals   Patient goals : Pt unable to state. Therapy Time   Individual Concurrent Group Co-treatment   Time In 1146         Time Out 1215         Minutes 29          tx time: 15 min       Co-treatment with PT warranted secondary to decreased safety and independence requiring 2 skilled therapy professionals to address individual discipline's goals.        Michelle Landry, OT

## 2021-07-30 NOTE — PLAN OF CARE
Problem: Skin Integrity:  Goal: Will show no infection signs and symptoms  Description: Will show no infection signs and symptoms  7/30/2021 1113 by Elaine Wilson RN  Outcome: Ongoing  7/30/2021 0208 by Chika Pierce RN  Outcome: Ongoing  Goal: Absence of new skin breakdown  Description: Absence of new skin breakdown  7/30/2021 1113 by Elaine Wilson RN  Outcome: Ongoing  7/30/2021 0208 by Chika Pierce RN  Outcome: Ongoing     Problem: Falls - Risk of:  Goal: Will remain free from falls  Description: Will remain free from falls  7/30/2021 1113 by Elaine Wilson RN  Outcome: Ongoing  Note: Siderails up x 2  Hourly rounding  Call light in reach  Instructed to call for assist before attempting out of bed. Remains free from falls and accidental injury at this time   Floor free from obstacles  Bed is locked and in lowest position  Adequate lighting provided  Bed alarm on, Red Falling star and Stay with Me signs posted      7/30/2021 0208 by Chika Pierce RN  Outcome: Ongoing  Note: Siderails up x 2  Hourly rounding  Call light in reach  Instructed to call for assist before attempting out of bed. Remains free from falls and accidental injury at this time   Floor free from obstacles  Bed is locked and in lowest position  Adequate lighting provided  Bed alarm on, Red Falling star and Stay with Me signs posted      Goal: Absence of physical injury  Description: Absence of physical injury  7/30/2021 1113 by Elaine Wilson RN  Outcome: Ongoing  7/30/2021 0208 by Chika Pierce RN  Outcome: Ongoing     Problem: Pain:  Description: Pain management should include both nonpharmacologic and pharmacologic interventions. Goal: Pain level will decrease  Description: Pain level will decrease  7/30/2021 1113 by Elaine Wilson RN  Outcome: Ongoing  7/30/2021 0208 by Chika Pierce RN  Outcome: Ongoing  Note: Pain level assessment complete.    Patient educated on pain scale and control interventions  PRN pain medication given per patient request  Patient instructed to call out with new onset of pain or unrelieved pain    Goal: Control of acute pain  Description: Control of acute pain  7/30/2021 1113 by Nadia Brar RN  Outcome: Ongoing  7/30/2021 0208 by Monica Crespo RN  Outcome: Ongoing  Goal: Control of chronic pain  Description: Control of chronic pain  7/30/2021 1113 by Nadia Brar RN  Outcome: Ongoing  7/30/2021 0208 by Monica Crespo RN  Outcome: Ongoing

## 2021-07-31 LAB
ABSOLUTE EOS #: 0.03 K/UL (ref 0–0.4)
ABSOLUTE IMMATURE GRANULOCYTE: 0.03 K/UL (ref 0–0.3)
ABSOLUTE LYMPH #: 0.76 K/UL (ref 1–4.8)
ABSOLUTE MONO #: 0.36 K/UL (ref 0.2–0.8)
ANION GAP SERPL CALCULATED.3IONS-SCNC: 9 MMOL/L (ref 9–17)
BASOPHILS # BLD: 1 %
BASOPHILS ABSOLUTE: 0.03 K/UL (ref 0–0.2)
BILIRUBIN URINE: NEGATIVE
BUN BLDV-MCNC: 23 MG/DL (ref 8–23)
BUN/CREAT BLD: 42 (ref 9–20)
CALCIUM SERPL-MCNC: 7.6 MG/DL (ref 8.6–10.4)
CHLORIDE BLD-SCNC: 109 MMOL/L (ref 98–107)
CO2: 25 MMOL/L (ref 20–31)
COLOR: YELLOW
COMMENT UA: ABNORMAL
CREAT SERPL-MCNC: 0.55 MG/DL (ref 0.5–0.9)
DIFFERENTIAL TYPE: ABNORMAL
EOSINOPHILS RELATIVE PERCENT: 1 % (ref 1–4)
GFR AFRICAN AMERICAN: >60 ML/MIN
GFR NON-AFRICAN AMERICAN: >60 ML/MIN
GFR SERPL CREATININE-BSD FRML MDRD: ABNORMAL ML/MIN/{1.73_M2}
GFR SERPL CREATININE-BSD FRML MDRD: ABNORMAL ML/MIN/{1.73_M2}
GLUCOSE BLD-MCNC: 88 MG/DL (ref 70–99)
GLUCOSE URINE: NEGATIVE
HCT VFR BLD CALC: 22.9 % (ref 36.3–47.1)
HCT VFR BLD CALC: 26.2 % (ref 36.3–47.1)
HCT VFR BLD CALC: 26.6 % (ref 36.3–47.1)
HCT VFR BLD CALC: 26.8 % (ref 36.3–47.1)
HEMOGLOBIN: 6.9 G/DL (ref 11.9–15.1)
HEMOGLOBIN: 8 G/DL (ref 11.9–15.1)
HEMOGLOBIN: 8.4 G/DL (ref 11.9–15.1)
HEMOGLOBIN: 8.6 G/DL (ref 11.9–15.1)
IMMATURE GRANULOCYTES: 1 %
KETONES, URINE: ABNORMAL
LEUKOCYTE ESTERASE, URINE: NEGATIVE
LYMPHOCYTES # BLD: 23 % (ref 24–44)
MCH RBC QN AUTO: 32.5 PG (ref 25.2–33.5)
MCHC RBC AUTO-ENTMCNC: 30.1 G/DL (ref 28.4–34.8)
MCV RBC AUTO: 108 FL (ref 82.6–102.9)
MONOCYTES # BLD: 11 % (ref 1–7)
MORPHOLOGY: ABNORMAL
MORPHOLOGY: ABNORMAL
NITRITE, URINE: NEGATIVE
NRBC AUTOMATED: ABNORMAL PER 100 WBC
PDW BLD-RTO: 14.1 % (ref 11.8–14.4)
PH UA: 5.5 (ref 5–8)
PLATELET # BLD: 80 K/UL (ref 138–453)
PLATELET ESTIMATE: ABNORMAL
PMV BLD AUTO: 12.3 FL (ref 8.1–13.5)
POTASSIUM SERPL-SCNC: 3.9 MMOL/L (ref 3.7–5.3)
PROTEIN UA: NEGATIVE
RBC # BLD: 2.12 M/UL (ref 3.95–5.11)
RBC # BLD: ABNORMAL 10*6/UL
SEG NEUTROPHILS: 63 % (ref 36–66)
SEGMENTED NEUTROPHILS ABSOLUTE COUNT: 2.09 K/UL (ref 1.8–7.7)
SODIUM BLD-SCNC: 143 MMOL/L (ref 135–144)
SPECIFIC GRAVITY UA: 1.02 (ref 1–1.03)
TURBIDITY: CLEAR
URINE HGB: NEGATIVE
UROBILINOGEN, URINE: NORMAL
WBC # BLD: 3.3 K/UL (ref 3.5–11.3)
WBC # BLD: ABNORMAL 10*3/UL

## 2021-07-31 PROCEDURE — P9016 RBC LEUKOCYTES REDUCED: HCPCS

## 2021-07-31 PROCEDURE — 36415 COLL VENOUS BLD VENIPUNCTURE: CPT

## 2021-07-31 PROCEDURE — 97530 THERAPEUTIC ACTIVITIES: CPT

## 2021-07-31 PROCEDURE — 6370000000 HC RX 637 (ALT 250 FOR IP): Performed by: ORTHOPAEDIC SURGERY

## 2021-07-31 PROCEDURE — 81003 URINALYSIS AUTO W/O SCOPE: CPT

## 2021-07-31 PROCEDURE — 1200000000 HC SEMI PRIVATE

## 2021-07-31 PROCEDURE — 6360000002 HC RX W HCPCS: Performed by: ORTHOPAEDIC SURGERY

## 2021-07-31 PROCEDURE — 80048 BASIC METABOLIC PNL TOTAL CA: CPT

## 2021-07-31 PROCEDURE — 99233 SBSQ HOSP IP/OBS HIGH 50: CPT | Performed by: INTERNAL MEDICINE

## 2021-07-31 PROCEDURE — 86900 BLOOD TYPING SEROLOGIC ABO: CPT

## 2021-07-31 PROCEDURE — 93005 ELECTROCARDIOGRAM TRACING: CPT

## 2021-07-31 PROCEDURE — 85025 COMPLETE CBC W/AUTO DIFF WBC: CPT

## 2021-07-31 PROCEDURE — 6360000002 HC RX W HCPCS: Performed by: INTERNAL MEDICINE

## 2021-07-31 PROCEDURE — 85014 HEMATOCRIT: CPT

## 2021-07-31 PROCEDURE — 97535 SELF CARE MNGMENT TRAINING: CPT | Performed by: NURSE PRACTITIONER

## 2021-07-31 PROCEDURE — 36430 TRANSFUSION BLD/BLD COMPNT: CPT

## 2021-07-31 PROCEDURE — 2580000003 HC RX 258: Performed by: NURSE PRACTITIONER

## 2021-07-31 PROCEDURE — 85018 HEMOGLOBIN: CPT

## 2021-07-31 PROCEDURE — 97110 THERAPEUTIC EXERCISES: CPT

## 2021-07-31 RX ORDER — SODIUM CHLORIDE 9 MG/ML
INJECTION, SOLUTION INTRAVENOUS PRN
Status: DISCONTINUED | OUTPATIENT
Start: 2021-07-31 | End: 2021-08-02 | Stop reason: HOSPADM

## 2021-07-31 RX ADMIN — KETOROLAC TROMETHAMINE 15 MG: 15 INJECTION, SOLUTION INTRAMUSCULAR; INTRAVENOUS at 03:10

## 2021-07-31 RX ADMIN — ATORVASTATIN CALCIUM 40 MG: 40 TABLET, FILM COATED ORAL at 20:43

## 2021-07-31 RX ADMIN — HYDROCODONE BITARTRATE AND ACETAMINOPHEN 1 TABLET: 5; 325 TABLET ORAL at 08:10

## 2021-07-31 RX ADMIN — BUMETANIDE 1 MG: 1 TABLET ORAL at 17:18

## 2021-07-31 RX ADMIN — ENOXAPARIN SODIUM 40 MG: 40 INJECTION SUBCUTANEOUS at 10:43

## 2021-07-31 RX ADMIN — CARBAMAZEPINE 800 MG: 200 CAPSULE, EXTENDED RELEASE ORAL at 10:43

## 2021-07-31 RX ADMIN — DIVALPROEX SODIUM 1250 MG: 500 TABLET, DELAYED RELEASE ORAL at 20:44

## 2021-07-31 RX ADMIN — SODIUM CHLORIDE: 9 INJECTION, SOLUTION INTRAVENOUS at 16:52

## 2021-07-31 RX ADMIN — DIVALPROEX SODIUM 1250 MG: 500 TABLET, DELAYED RELEASE ORAL at 10:43

## 2021-07-31 RX ADMIN — CARBAMAZEPINE 800 MG: 200 CAPSULE, EXTENDED RELEASE ORAL at 20:15

## 2021-07-31 ASSESSMENT — PAIN SCALES - GENERAL
PAINLEVEL_OUTOF10: 5
PAINLEVEL_OUTOF10: 7

## 2021-07-31 NOTE — PROGRESS NOTES
Verbal consent via phone for blood transfusion was obtained from Mauricio Henderson (caregiver) and verified by 2 RNs.

## 2021-07-31 NOTE — PROGRESS NOTES
Physical Therapy  Facility/Department: [facility] MED SURG  Daily Treatment Note  NAME: Chucky Byers  : 1945  MRN: [id number]    Date of Service: 2021    Discharge Recommendations:  Patient would benefit from continued therapy after discharge   Assessment   Body structures, Functions, Activity limitations: Decreased functional mobility ; Decreased safe awareness;Decreased balance;Decreased endurance; Increased pain;Decreased strength  Assessment: Skilled therapy needed to maximize independence with functional mobility and to improve balance, strength, and endurance. Patient currently  is a high fall risk due to being 2 assist  and NWB R LE. Recommend further therapy at D/C  Decision Making: High Complexity  REQUIRES PT FOLLOW UP: Yes  Activity Tolerance  Activity Tolerance: Patient limited by pain; Patient limited by cognitive status     Patient Diagnosis(es): The encounter diagnosis was Closed fracture of right femur, unspecified fracture morphology, unspecified portion of femur, initial encounter ([facility] [address] has a past medical history of Anxiety, Arthritis, Cataracts, bilateral, Essential (primary) hypertension, Fracture (healed) treatment follow-up, Mental retardation, Movement disorder, Primary osteoarthritis involving multiple joints, Rheumatoid arthritis ([facility] [address] and Seizures ([facility] [address] has a past surgical history that includes Hip fracture surgery (Right, 14); Leg Surgery (Right, 2018); pr treat tibial shaft fx, intramed implant (Right, 2018); and Femur fracture surgery (Right, 2021). Restrictions  Restrictions/Precautions  Restrictions/Precautions: Weight Bearing, Fall Risk, Up as Tolerated  Lower Extremity Weight Bearing Restrictions  Right Lower Extremity Weight Bearing: Non Weight Bearing  Position Activity Restriction  Other position/activity restrictions: R LE NWB, heels off bed, external catheter, R UE IV, wound vac R LE  Subjective   General  Chart Reviewed:  Yes  Additional Pertinent Hx: MRDD  Family / Caregiver Present: No  General Comment  Comments: RN states patient is appropriate for therapy  Pain Screening  Patient Currently in Pain: Yes  Pain Assessment  Pain Assessment: Faces  Vital Signs  Patient Currently in Pain: Yes  Pre Treatment Pain Screening  Comments / Details: R LE pain with any movement    Orientation  Orientation  Overall Orientation Status: Impaired  Orientation Level: Oriented to person  Objective   Bed mobility  Rolling: Maximal assistance;2 Person assistance  Scooting: Maximal assistance;2 Person assistance  Ambulation  Ambulation?: No     Balance  Posture: Fair  Sitting - Static: Poor  Sitting - Dynamic: Poor  Exercises  Pt performed activities in bed including rolling R and L for bathing and positioning  Pt was positioned at top of bed for pressure relief and comfort.  Max assist x 2 for all mobility     AM-PAC Score     AM-PAC Inpatient Mobility without Stair Climbing Raw Score : 8 (07/31/21 1111)  AM-PAC Inpatient without Stair Climbing T-Scale Score : 30.65 (07/31/21 1111)  Mobility Inpatient CMS 0-100% Score: 80.91 (07/31/21 1111)  Mobility Inpatient without Stair CMS G-Code Modifier : CM (07/31/21 1111)       Goals  Short term goals  Time Frame for Short term goals: 8 visits  Short term goal 1: Min A bed mobility  Short term goal 2: Patient to sit EOB x10  minutes with min A  Short term goal 3: Patient to perform 10reps R LE ex  Short term goal 4: Standing and ambulation to be assessed  if patient becomes appropriate  Patient Goals   Patient goals : to have less pain    Plan    Plan  Times per week: 1-2x/day for 5-6 days/week  Current Treatment Recommendations: Transfer Training, Strengthening, Balance Training, Safety Education & Training, Patient/Caregiver Education & Training  Safety Devices  Type of devices: Nurse notified, Call light within reach, Bed alarm in place, All fall risk precautions in place     Therapy Time   Individual Concurrent Group Co-treatment   Time In       3166   Time Out       1005   Minutes        21     Co-treatment with OT warranted secondary to decreased safety and independence requiring 2 skilled therapy professionals to address individual discipline's goals. PT addressing \"pre gait trunk strengthening\",\"weight shifting prior to transfers\",\"transfer training\",\"postural control in sitting\".   Marnie Dash, PTA

## 2021-07-31 NOTE — PROGRESS NOTES
[facility]  Office: [address], DO, Eileen Tampa, DO, Babak Minor, DO, Viviana [place] Blood, DO, Aicha Farris MD, Rashawn Adrian MD, Tejas Carlos MD, Drew Castellano MD, Tristin Banerjee MD, Lorraine Whitlock MD, Nicholas Kaplan MD, George Conner, DO, Renuka Escobar MD, Bairon Joyce, DO, Austen Moyer MD,  Saintclair Lank, DO, Yarelis Roe MD, Yahaira Cardenas MD, Logan Maria MD, Regina Nevarez MD, Kristine Horta MD, Be Ricci MD, Daryle Springer, [facility], [facility], CNP, Montse Morrison, CNP, Taras Medina, CNS, Debbie Peralta, CNP, Grant Tillman, CNP, Sybil Severino, CNP, Crow Carrillo, CNP, Barrett Buchanan, CNP, Laisha Jerome PA-C, Aleks Hylton, [facility], Zaynab Villalobos, CNP, [facility], CNP, Jazmin Mnesah, CNP, Dana Clark, CNP, Ponce Matthews, CNP, Noris Zimmer, CNP, Burt Pelayo, [facility], [facility], [place]    Progress Note    7/31/2021    1:07 PM    Name:   Aurora Manley  MRN:     [id number]     [place]:      [de-identified]   Room:   2009/2009-02  IP Day:  3  Admit Date:  7/28/2021 12:52 PM    PCP:   Cisco Pickett DO  Code Status:  Full Code    Subjective:     C/C:   Chief Complaint   Patient presents with   Clark Fall     right leg and left hand injury. Interval History Status:     Patient seen and evaluated at bedside this morning. No acute events overnight. No meaningful history can be obtained from patient due to mental state. She required blood transfusion for low hemoglobin. Brief History:   Aurora Manley is a 77 y.o.  Non- / non  female with a history of hypertension, age-related osteoporosis with previous history of fractures, seizure disorder and MRDD who presents from a group home with after a mechanical fall iin a bathroom with a right leg and left hand injury and is admitted to the hospital for the management of Closed comminuted intra-articular fracture of distal end of right femur [facility]).  She is a poor historian and history obtained from EHR and medical staff. She points to her knee and states it hurts. CT head unremarkable, left handed wrist x-rays unremarkable for fracture. X-ray of her right knee revealed a comminuted distal femur fracture. Ortho was consulted from ER who recommended a CT of her knee and plans to take her to the OR for repair   Review of Systems:     Limited due to developmental delay: + right knee paim    Medications: Allergies:  No Known Allergies    Current Meds:   Scheduled Meds:    naloxone  0.4 mg Intravenous Once    atorvastatin  40 mg Oral Nightly    bumetanide  1 mg Oral Daily    sodium chloride flush  5-40 mL Intravenous 2 times per day    enoxaparin  40 mg Subcutaneous Daily    divalproex  1,250 mg Oral BID    carBAMazepine  800 mg Oral BID     Continuous Infusions:    sodium chloride      sodium chloride 100 mL/hr at 21 1824    sodium chloride       PRN Meds: sodium chloride, ketorolac, docusate sodium, sodium chloride flush, sodium chloride, ondansetron **OR** ondansetron, polyethylene glycol, acetaminophen, HYDROcodone 5 mg - acetaminophen **OR** HYDROcodone 5 mg - acetaminophen, morphine    Data:     Past Medical History:   has a past medical history of Anxiety, Arthritis, Cataracts, bilateral, Essential (primary) hypertension, Fracture (healed) treatment follow-up, Mental retardation, Movement disorder, Primary osteoarthritis involving multiple joints, Rheumatoid arthritis ([facility] [address] and Seizures ([facility] [address] Social History:   reports that she has never smoked. She has never used smokeless tobacco. She reports that she does not drink alcohol and does not use drugs.      Family History:   Family History   Family history unknown: Yes       Vitals:  BP (!) 112/44   Pulse 87   Temp 97.9 °F (36.6 °C) (Oral)   Resp 18   Ht 5' (1.524 m)   Wt 134 lb (60.8 kg)   SpO2 99%   BMI 26.17 kg/m²   Temp (24hrs), Av.1 °F (36.7 °C), Min:97.9 °F (36.6 Component Value Date/Time    CULTURE NO SIGNIFICANT GROWTH 09/02/2016 10:02 AM    CULTURE  09/02/2016 10:02 AM     Performed at [facility], [address] [place] [address] [phone]       Radiology:  XR WRIST LEFT (MIN 3 VIEWS)    Result Date: 7/28/2021  Diffuse osteopenia without acute osseous or soft tissue abnormality. XR HAND LEFT (MIN 3 VIEWS)    Result Date: 7/28/2021  Diffuse degenerative joint disease without acute osseous or soft tissue abnormality. XR FEMUR RIGHT (MIN 2 VIEWS)    Result Date: 7/28/2021  Comminuted fracture of the distal femoral metaphysis with impaction. XR KNEE RIGHT (3 VIEWS)    Result Date: 7/28/2021  Comminuted impacted fracture of the distal femoral metaphysis. XR TIBIA FIBULA RIGHT (2 VIEWS)    Result Date: 7/28/2021  Comminuted fracture of the distal femoral metaphysis with impaction. Diffuse soft tissue swelling. CT HEAD WO CONTRAST    Result Date: 7/28/2021  No acute intracranial abnormality. Stable compared to prior study     CT KNEE RIGHT WO CONTRAST    Result Date: 7/28/2021  Comminuted mildly displaced fracture of the distal femoral metadiaphysis as above. The bones appear demineralized. Small knee joint effusion.        Physical Examination:        General appearance:  Drowsy but awakens easily, cooperative and no distress  Mental Status:  oriented to person, developmental delay  Lungs:  clear to auscultation bilaterally, normal effort  Heart:  regular rate and rhythm, no murmur  Abdomen:  soft, nontender, nondistended, normal bowel sounds, no masses, hepatomegaly, splenomegaly  Extremities:  +2 BLE chronic edema, redness, no tenderness in the calves  Skin:  no gross lesions, rashes, induration  ecchymosis to left cheek   Assessment:        Hospital Problems         Last Modified POA    * (Principal) Closed comminuted intra-articular fracture of distal end of right femur ([address] 7/28/2021 Yes    Seizure disorder (Chronic) 7/28/2021 Yes Mental retardation (Chronic) 7/28/2021 Yes    Overview Signed 6/10/2019  8:50 AM by [facility]     Replacing deprecated diagnoses         Anxiety (Chronic) 7/28/2021 Yes    Age-related osteoporosis with current pathological fracture with routine healing 7/28/2021 Yes    Dependence on wheelchair 7/28/2021 Yes    Essential (primary) hypertension 7/28/2021 Yes    Closed fracture of right femur ([address] 7/29/2021 Yes          Plan:        1. Status post right femur open reduction internal fixation. Postop day 2. Orthopedics following. 2. Acute blood loss anemia. Hemoglobin 6.9 this morning. Transfuse 1 u prbc. We will continue to monitor. 3. Continue telemetry monitoring  4. Continue pain control  5. Continue atorvastatin, Bumex, carbamazepine, Depakote, Lovenox.     Minoo Bryson MD  7/31/2021  1:07 PM

## 2021-07-31 NOTE — PROGRESS NOTES
Patient was observed for first 15 minutes of blood transfusion. No transfusion reactions notes. VSS. Will continue to monitor.

## 2021-07-31 NOTE — PROGRESS NOTES
Occupational Therapy  Facility/Department: [facility]  Daily Treatment Note  NAME: June Og  : 1945  MRN: [id number]    Date of Service: 2021    Discharge Recommendations:  Patient would benefit from continued therapy after discharge    Pt currently functioning below baseline. Would suggest additional therapy at time of discharge to maximize long term outcomes and prevent re-admission. Please refer to AM-PAC score for current level of function. Assessment   Performance deficits / Impairments: Decreased functional mobility ; Decreased safe awareness;Decreased ROM; Decreased strength;Decreased endurance;Decreased high-level IADLs;Decreased fine motor control;Decreased cognition;Decreased posture;Decreased balance;Decreased coordination;Decreased ADL status  Assessment: Skilled OT services are indicated to Maximize functional I and safety to return home at prior level as able with assist.  Prognosis: Fair  OT Education: OT Role;Plan of Care;Precautions;Transfer Training  REQUIRES OT FOLLOW UP: Yes  Activity Tolerance  Activity Tolerance: Patient Tolerated treatment well;Patient limited by pain;Treatment limited secondary to decreased cognition  Safety Devices  Safety Devices in place: Yes  Type of devices: Nurse notified;Gait belt;Bed alarm in place;Call light within reach; Patient at risk for falls; All fall risk precautions in place; Left in bed         Patient Diagnosis(es): The encounter diagnosis was Closed fracture of right femur, unspecified fracture morphology, unspecified portion of femur, initial encounter ([facility] [address] has a past medical history of Anxiety, Arthritis, Cataracts, bilateral, Essential (primary) hypertension, Fracture (healed) treatment follow-up, Mental retardation, Movement disorder, Primary osteoarthritis involving multiple joints, Rheumatoid arthritis ([address] and Seizures ([facility] [address] has a past surgical history that includes Hip fracture surgery (Right, 14);  Leg Surgery (Right, 07/18/2018); pr treat tibial shaft fx, intramed implant (Right, 7/18/2018); and Femur fracture surgery (Right, 7/29/2021). Restrictions  Restrictions/Precautions  Restrictions/Precautions: Weight Bearing, Fall Risk, Up as Tolerated  Lower Extremity Weight Bearing Restrictions  Right Lower Extremity Weight Bearing: Non Weight Bearing  Position Activity Restriction  Other position/activity restrictions: R LE NWB, heels off bed, external catheter, R UE IV, wound vac R LE  Subjective   General  Chart Reviewed: Yes  Patient assessed for rehabilitation services?: Yes  Response to previous treatment: Patient with no complaints from previous session  Family / Caregiver Present: No  Subjective  Subjective: Pt in bed, agreeable to therapy      Orientation  Orientation  Overall Orientation Status:  (CRISTOBAL)  Objective    ADL  Additional Comments: Pt is limited by physical and cognitive deficits. MaxA washing back during bed mobility. Bed mobility  Rolling to Left: Maximum assistance;2 Person assistance  Rolling to Right: Maximum assistance;2 Person assistance  Comment: Linens soiled upon arrival. Rolling R and L inorder to allow back to be washed and linens to be changed. VC for functional use of BUE on bed rails with good carryover.                           Cognition  Overall Cognitive Status: Exceptions  Arousal/Alertness: Appropriate responses to stimuli  Following Commands: Does not follow commands  Safety Judgement: Decreased awareness of need for assistance;Decreased awareness of need for safety  Problem Solving: Decreased awareness of errors  Insights: Decreased awareness of deficits  Initiation: Requires cues for all  Sequencing: Requires cues for all              Plan   Plan  Times per week: 4-5x/wk 1x/day as cecily  Current Treatment Recommendations: Strengthening, Endurance Training, Safety Education & Training, Positioning, Pain Management, Home Management Training, Self-Care / ADL, Equipment Evaluation, Education, & procurement, Cognitive Reorientation, Patient/Caregiver Education & Training, Neuromuscular Re-education    AM-PAC Score        AM-PAC Inpatient Daily Activity Raw Score: 10 (07/31/21 1126)  AM-PAC Inpatient ADL T-Scale Score : 27.31 (07/31/21 1126)  ADL Inpatient CMS 0-100% Score: 74.7 (07/31/21 1126)  ADL Inpatient CMS G-Code Modifier : CL (07/31/21 1126)    Goals  Short term goals  Time Frame for Short term goals: By discharge, pt to demo  Short term goal 1: bed mobility to Min A of 1 with use of bedrails as needed. Short term goal 2: increased sitting cecily > 10 min to increase participation in functional tasks. Short term goal 3: tolerance for resassessment of functional mobility and ADL transfers if appropriate to add goal to POC. Short term goal 4: increased B UE strength by 1/2 grade to assist with functional tasks/caregivers I with simple BUE HEP with use of handouts as needed. Short term goal 5: grooming task to Min A while seated with use of AD/AE as needed. Patient Goals   Patient goals : Pt unable to state. Therapy Time   Individual Concurrent Group Co-treatment   Time In 3653         Time Out 1005         Minutes 23           Upon writer exit, call light within reach, pt retired to bed. All lines intact and patient positioned comfortably. All patient needs addressed prior to ending therapy session. Chart reviewed prior to treatment and patient is agreeable for therapy. RN reports patient is medically stable for therapy treatment this date. Co-treatment with PT warranted secondary to decreased safety and independence requiring 2 skilled therapy professionals to address individual discipline's goals. OT addressing functional reaching, ability to sequence and follow directions and functional UE strength.          ESTELA Guzman

## 2021-08-01 LAB
ABO/RH: NORMAL
ANTIBODY SCREEN: NEGATIVE
ARM BAND NUMBER: NORMAL
BLD PROD TYP BPU: NORMAL
BLD PROD TYP BPU: NORMAL
CROSSMATCH RESULT: NORMAL
CROSSMATCH RESULT: NORMAL
DISPENSE STATUS BLOOD BANK: NORMAL
DISPENSE STATUS BLOOD BANK: NORMAL
EKG ATRIAL RATE: 89 BPM
EKG P AXIS: 55 DEGREES
EKG P-R INTERVAL: 136 MS
EKG Q-T INTERVAL: 354 MS
EKG QRS DURATION: 70 MS
EKG QTC CALCULATION (BAZETT): 430 MS
EKG R AXIS: 7 DEGREES
EKG T AXIS: 52 DEGREES
EKG VENTRICULAR RATE: 89 BPM
EXPIRATION DATE: NORMAL
HCT VFR BLD CALC: 24.7 % (ref 36.3–47.1)
HCT VFR BLD CALC: 25.2 % (ref 36.3–47.1)
HCT VFR BLD CALC: 25.5 % (ref 36.3–47.1)
HCT VFR BLD CALC: 26.3 % (ref 36.3–47.1)
HEMOGLOBIN: 7.7 G/DL (ref 11.9–15.1)
HEMOGLOBIN: 7.9 G/DL (ref 11.9–15.1)
HEMOGLOBIN: 8 G/DL (ref 11.9–15.1)
HEMOGLOBIN: 8.2 G/DL (ref 11.9–15.1)
TRANSFUSION STATUS: NORMAL
TRANSFUSION STATUS: NORMAL
UNIT DIVISION: 0
UNIT DIVISION: 0
UNIT NUMBER: NORMAL
UNIT NUMBER: NORMAL

## 2021-08-01 PROCEDURE — 6360000002 HC RX W HCPCS: Performed by: ORTHOPAEDIC SURGERY

## 2021-08-01 PROCEDURE — 99233 SBSQ HOSP IP/OBS HIGH 50: CPT | Performed by: INTERNAL MEDICINE

## 2021-08-01 PROCEDURE — 1200000000 HC SEMI PRIVATE

## 2021-08-01 PROCEDURE — 2580000003 HC RX 258: Performed by: NURSE PRACTITIONER

## 2021-08-01 PROCEDURE — 6360000002 HC RX W HCPCS: Performed by: INTERNAL MEDICINE

## 2021-08-01 PROCEDURE — 85018 HEMOGLOBIN: CPT

## 2021-08-01 PROCEDURE — 6370000000 HC RX 637 (ALT 250 FOR IP): Performed by: ORTHOPAEDIC SURGERY

## 2021-08-01 PROCEDURE — 36415 COLL VENOUS BLD VENIPUNCTURE: CPT

## 2021-08-01 PROCEDURE — 85014 HEMATOCRIT: CPT

## 2021-08-01 RX ORDER — ATORVASTATIN CALCIUM 40 MG/1
40 TABLET, FILM COATED ORAL NIGHTLY
Qty: 30 TABLET | Refills: 3 | Status: SHIPPED | OUTPATIENT
Start: 2021-08-01 | End: 2022-01-01

## 2021-08-01 RX ORDER — DIVALPROEX SODIUM 250 MG/1
1250 TABLET, DELAYED RELEASE ORAL 2 TIMES DAILY
Qty: 90 TABLET | Refills: 3 | Status: SHIPPED | OUTPATIENT
Start: 2021-08-01 | End: 2021-01-01

## 2021-08-01 RX ORDER — CARBAMAZEPINE 200 MG/1
800 CAPSULE, EXTENDED RELEASE ORAL 2 TIMES DAILY
Qty: 60 CAPSULE | Refills: 3 | Status: SHIPPED | OUTPATIENT
Start: 2021-08-01 | End: 2021-01-01

## 2021-08-01 RX ADMIN — ATORVASTATIN CALCIUM 40 MG: 40 TABLET, FILM COATED ORAL at 22:04

## 2021-08-01 RX ADMIN — CARBAMAZEPINE 800 MG: 200 CAPSULE, EXTENDED RELEASE ORAL at 22:04

## 2021-08-01 RX ADMIN — KETOROLAC TROMETHAMINE 15 MG: 15 INJECTION, SOLUTION INTRAMUSCULAR; INTRAVENOUS at 23:12

## 2021-08-01 RX ADMIN — ENOXAPARIN SODIUM 40 MG: 40 INJECTION SUBCUTANEOUS at 16:58

## 2021-08-01 RX ADMIN — SODIUM CHLORIDE: 9 INJECTION, SOLUTION INTRAVENOUS at 02:59

## 2021-08-01 RX ADMIN — SODIUM CHLORIDE: 9 INJECTION, SOLUTION INTRAVENOUS at 12:58

## 2021-08-01 RX ADMIN — KETOROLAC TROMETHAMINE 15 MG: 15 INJECTION, SOLUTION INTRAMUSCULAR; INTRAVENOUS at 11:12

## 2021-08-01 RX ADMIN — DIVALPROEX SODIUM 1250 MG: 500 TABLET, DELAYED RELEASE ORAL at 11:12

## 2021-08-01 RX ADMIN — HYDROCODONE BITARTRATE AND ACETAMINOPHEN 1 TABLET: 5; 325 TABLET ORAL at 05:24

## 2021-08-01 RX ADMIN — SODIUM CHLORIDE: 9 INJECTION, SOLUTION INTRAVENOUS at 22:11

## 2021-08-01 RX ADMIN — CARBAMAZEPINE 800 MG: 200 CAPSULE, EXTENDED RELEASE ORAL at 11:13

## 2021-08-01 RX ADMIN — BUMETANIDE 1 MG: 1 TABLET ORAL at 11:12

## 2021-08-01 RX ADMIN — DIVALPROEX SODIUM 1250 MG: 500 TABLET, DELAYED RELEASE ORAL at 22:04

## 2021-08-01 ASSESSMENT — PAIN SCALES - GENERAL
PAINLEVEL_OUTOF10: 4
PAINLEVEL_OUTOF10: 6

## 2021-08-01 NOTE — CARE COORDINATION
Discharge Planning    Level of [facility] received from [facility] and faxed to [address] [place] [address] at fax [phone]. Pt received one unit of blood and Hbg 7.9. Pt can be discharged to SNF ([facility]) when medically stable and ready. Call SNF at [phone] when pt ready to discharge.

## 2021-08-01 NOTE — PROGRESS NOTES
[facility]  Office: [address], DO, Carey Oneal, DO, Ron Symoneee, DO, Grace Spire Blood, DO, Sachin Camacho MD, Jackie Hewitt MD, Juan Antonio Rodrigues MD, Sole Delgadillo MD, Pawan Marie MD, Elina Guillen MD, Henry Wright MD, Forrest Hazel, DO, Sara Hernandez MD, Berkley Vasquez, DO, Hernan Guthrie MD,  Jessie Rota, DO, James Henry MD, Neo Wright MD, Eduard Hill MD, Lucila Swan MD, Harsha Youssef MD, Adrien Drummond MD, Grey Mcclellan, [facility], [facility], CNP, Niecy Prasad, CNP, Tila Berkowitz, CNS, Shira Nelson, CNP, Manjula Knowles, CNP, Kervin Rodriguez, CNP, Heddy Sacks, CNP, Crissy Orellana, CNP, Jeanne Machado PA-C, Phyllis Villatoro, [facility], Sidney Oneal, CNP, Maggie Blue, CNP, Wyatt Galan, CNP, Calvin Owen, CNP, Saroj Stern, CNP, Mauricio Deng, CNP, Cecilia Mendoza, [facility], [facility]    Progress Note    8/1/2021    1:11 PM    Name:   June Og  MRN:     [id number]     [place]:      [de-identified]   Room:   2009/2009-02  IP Day:  4  Admit Date:  7/28/2021 12:52 PM    PCP:   Richard Koyanagi, DO  Code Status:  Full Code    Subjective:     C/C:   Chief Complaint   Patient presents with   Dorena Lowell Fall     right leg and left hand injury. Interval History Status:     Patient seen and evaluated at bedside this morning. As per nursing staff patient has been having trouble swallowing since her surgery. Nursing staff reports this is new for her as she did not have any issues swallowing prior to her procedure. No meaningful history can be obtained from patient due to mental state. She required blood transfusion for low hemoglobin. Brief History:   June Og is a 77 y.o.  Non- / non  female with a history of hypertension, age-related osteoporosis with previous history of fractures, seizure disorder and MRDD who presents from a group home with after a mechanical fall iin a bathroom with a right leg and left hand injury and is admitted to the hospital for the management of Closed comminuted intra-articular fracture of distal end of right femur ([facility] [address] She is a poor historian and history obtained from EHR and medical staff. She points to her knee and states it hurts. CT head unremarkable, left handed wrist x-rays unremarkable for fracture. X-ray of her right knee revealed a comminuted distal femur fracture. Ortho was consulted from ER who recommended a CT of her knee and plans to take her to the OR for repair   Review of Systems:     Limited due to developmental delay: + right knee paim    Medications: Allergies:  No Known Allergies    Current Meds:   Scheduled Meds:    naloxone  0.4 mg Intravenous Once    atorvastatin  40 mg Oral Nightly    bumetanide  1 mg Oral Daily    sodium chloride flush  5-40 mL Intravenous 2 times per day    enoxaparin  40 mg Subcutaneous Daily    divalproex  1,250 mg Oral BID    carBAMazepine  800 mg Oral BID     Continuous Infusions:    sodium chloride      sodium chloride 100 mL/hr at 08/01/21 1258    sodium chloride       PRN Meds: sodium chloride, ketorolac, docusate sodium, sodium chloride flush, sodium chloride, ondansetron **OR** ondansetron, polyethylene glycol, acetaminophen, HYDROcodone 5 mg - acetaminophen **OR** HYDROcodone 5 mg - acetaminophen, morphine    Data:     Past Medical History:   has a past medical history of Anxiety, Arthritis, Cataracts, bilateral, Essential (primary) hypertension, Fracture (healed) treatment follow-up, Mental retardation, Movement disorder, Primary osteoarthritis involving multiple joints, Rheumatoid arthritis ([facility] [address] and Seizures ([facility] [address] Social History:   reports that she has never smoked. She has never used smokeless tobacco. She reports that she does not drink alcohol and does not use drugs.      Family History:   Family History   Family history unknown: Yes       Vitals:  BP (!) 161/71   Pulse 84 CULTURE  09/02/2016 10:02 AM     Performed at [facility], [address] [place] [address] [phone]       Radiology:  XR WRIST LEFT (MIN 3 VIEWS)    Result Date: 7/28/2021  Diffuse osteopenia without acute osseous or soft tissue abnormality. XR HAND LEFT (MIN 3 VIEWS)    Result Date: 7/28/2021  Diffuse degenerative joint disease without acute osseous or soft tissue abnormality. XR FEMUR RIGHT (MIN 2 VIEWS)    Result Date: 7/28/2021  Comminuted fracture of the distal femoral metaphysis with impaction. XR KNEE RIGHT (3 VIEWS)    Result Date: 7/28/2021  Comminuted impacted fracture of the distal femoral metaphysis. XR TIBIA FIBULA RIGHT (2 VIEWS)    Result Date: 7/28/2021  Comminuted fracture of the distal femoral metaphysis with impaction. Diffuse soft tissue swelling. CT HEAD WO CONTRAST    Result Date: 7/28/2021  No acute intracranial abnormality. Stable compared to prior study     CT KNEE RIGHT WO CONTRAST    Result Date: 7/28/2021  Comminuted mildly displaced fracture of the distal femoral metadiaphysis as above. The bones appear demineralized. Small knee joint effusion.        Physical Examination:        General appearance:  Drowsy but awakens easily, cooperative and no distress  Mental Status:  oriented to person, developmental delay  Lungs:  clear to auscultation bilaterally, normal effort  Heart:  regular rate and rhythm, no murmur  Abdomen:  soft, nontender, nondistended, normal bowel sounds, no masses, hepatomegaly, splenomegaly  Extremities:  +2 BLE chronic edema, redness, no tenderness in the calves  Skin:  no gross lesions, rashes, induration  ecchymosis to left cheek   Assessment:        Hospital Problems         Last Modified POA    * (Principal) Closed comminuted intra-articular fracture of distal end of right femur ([facility] [address] 8/1/2021 Yes    Seizure disorder (Chronic) 8/1/2021 Yes    Mental retardation (Chronic) 8/1/2021 Yes    Overview Signed 6/10/2019  8:50 AM by [facility]     Replacing deprecated diagnoses         Anxiety (Chronic) 8/1/2021 Yes    Age-related osteoporosis with current pathological fracture with routine healing 8/1/2021 Yes    Dependence on wheelchair 8/1/2021 Yes    Essential (primary) hypertension 8/1/2021 Yes    Closed fracture of right femur ([address] 8/1/2021 Yes          Plan:        1. Status post right femur open reduction internal fixation. Postop day 3. Orthopedics following. 2. Acute blood loss anemia. Improving. Hemoglobin 8.2 this morning. Status post 1 unit PRBC  3. Dysphagia. Will order modified barium swallow. 4. Continue telemetry monitoring  5. Continue pain control  6. Continue atorvastatin, Bumex, carbamazepine, Depakote, Lovenox.     Mary Tesfaye MD  8/1/2021  1:11 PM

## 2021-08-01 NOTE — DISCHARGE SUMMARY
[facility]  Office: [address], DO, Omar Gray, DO, Brandon Rodney, DO, Uzair Cobian, DO, Shamika Peña MD, Codi Morales MD, Jairon Borrero MD, Sindy Diaz MD, Terra Younger MD, Alyssa Obrien MD, Rolf Mathis MD, Samanta Jara, DO, Taurus Davidson MD, Media Sensor, DO, Aide Echavarria MD,  Jessica Hardin DO, Rah Fan MD, Alex Benitez MD, Saud Ahumada MD, Reva Duran MD, Ryan Bowman MD, Isabela Valentine MD, Marah Mcnulty MD, Eliecer Garsia, [facility], [facility], CNP, Crystal Caballero, CNP, Jade Webber, CNS, Estrellita Abbasi, CNP, Dina Ramírez, CNP, Jong Abdalla, CNP, Merlinda Goring, CNP, Neftali Bowles, CNP, Melanie Malhotra PA-C, Leeanna Dyson, [facility], Jb Huggins, CNP, Ofelia Ortiz, CNP, Mel Hdez, CNP, Noris Medrano, CNP, Shay Melendez, CNP, Micheal Ruby, CNP, Shari Sears, CNP, Klaudia Miles, [facility]    Discharge Summary     Patient ID: Leo Victor  :  1945   MRN: [id number]     ACCOUNT:  [de-identified]   Patient's PCP: Royce Oneill DO  Admit Date: 2021   Discharge Date: 2021     Length of Stay: 5  Code Status:  Full Code  Admitting Physician: Alex Benitez MD  Discharge Physician: Alex Benitez MD     Active Discharge Diagnoses:     Hospital Problem Lists:  Principal Problem:    Closed comminuted intra-articular fracture of distal end of right femur [facility])  Active Problems:    Seizure disorder    Mental retardation    Anxiety    Age-related osteoporosis with current pathological fracture with routine healing    Dependence on wheelchair    Essential (primary) hypertension    Closed fracture of right femur [facility])  Resolved Problems:    * No resolved hospital problems.  *      Admission Condition:  poor     Discharged Condition: fair    Hospital Stay:     Hospital Course:      78-year-old female with history of hypertension, osteoporosis, seizure disorder and mental retardation who came from a group home after having a mechanical fall in the bathroom with the right leg and left hand injury and admitted to our facility for the management of closed comminuted intra-articular fracture of distal end of right femur. Patient is a status post right femur open reduction internal fixation postop day #3. Post surgery patient was noted to have acute blood loss anemia for which she required 1 unit of PRBC. Patient is a stable for discharge to nursing facility.         Significant therapeutic interventions: Right femur open reduction internal fixation    Significant Diagnostic Studies:   Labs / Micro:  CBC:   Lab Results   Component Value Date    WBC 3.3 07/31/2021    RBC 2.12 07/31/2021    RBC 3.53 03/07/2019    HGB 8.0 08/02/2021    HCT 25.6 08/02/2021    .0 07/31/2021    MCH 32.5 07/31/2021    MCHC 30.1 07/31/2021    RDW 14.1 07/31/2021    PLT 80 07/31/2021     BMP:    Lab Results   Component Value Date    GLUCOSE 88 07/31/2021    GLUCOSE 85 03/07/2019     07/31/2021    K 3.9 07/31/2021     07/31/2021    CO2 25 07/31/2021    ANIONGAP 9 07/31/2021    BUN 23 07/31/2021    CREATININE 0.55 07/31/2021    BUNCRER 42 07/31/2021    CALCIUM 7.6 07/31/2021    LABGLOM >60 07/31/2021    GFRAA >60 07/31/2021    GFR      07/31/2021    GFR NOT REPORTED 07/31/2021     HFP:    Lab Results   Component Value Date    PROT 6.4 07/19/2021    PROT 6.4 03/06/2019     CMP:    Lab Results   Component Value Date    GLUCOSE 88 07/31/2021    GLUCOSE 85 03/07/2019     07/31/2021    K 3.9 07/31/2021     07/31/2021    CO2 25 07/31/2021    BUN 23 07/31/2021    CREATININE 0.55 07/31/2021    ANIONGAP 9 07/31/2021    ALKPHOS 121 07/19/2021    ALT 8 07/19/2021    AST 14 07/19/2021    BILITOT 0.26 07/19/2021    LABALBU 3.8 07/19/2021    LABALBU 3.8 03/06/2019    ALBUMIN 1.5 07/19/2021    LABGLOM >60 07/31/2021    GFRAA >60 07/31/2021    GFR      07/31/2021    GFR NOT REPORTED 07/31/2021 PROT 6.4 07/19/2021    PROT 6.4 03/06/2019    CALCIUM 7.6 07/31/2021     PT/INR:    Lab Results   Component Value Date    PROTIME 13.9 07/28/2021    INR 1.1 07/28/2021     PTT:   Lab Results   Component Value Date    APTT 38.3 07/28/2021     FLP:    Lab Results   Component Value Date    CHOL 248 07/19/2021    TRIG 121 07/19/2021    HDL 82 07/19/2021     U/A:    Lab Results   Component Value Date    COLORU YELLOW 07/31/2021    TURBIDITY CLEAR 07/31/2021    SPECGRAV 1.025 07/31/2021    HGBUR NEGATIVE 07/31/2021    PHUR 5.5 07/31/2021    PROTEINU NEGATIVE 07/31/2021    GLUCOSEU NEGATIVE 07/31/2021    KETUA TRACE 07/31/2021    BILIRUBINUR NEGATIVE 07/31/2021    UROBILINOGEN Normal 07/31/2021    NITRU NEGATIVE 07/31/2021    LEUKOCYTESUR NEGATIVE 07/31/2021     TSH:    Lab Results   Component Value Date    TSH 2.02 04/04/2017        Radiology:  XR WRIST LEFT (MIN 3 VIEWS)    Result Date: 7/28/2021  Diffuse osteopenia without acute osseous or soft tissue abnormality. XR HAND LEFT (MIN 3 VIEWS)    Result Date: 7/28/2021  Diffuse degenerative joint disease without acute osseous or soft tissue abnormality. XR FEMUR RIGHT (MIN 2 VIEWS)    Result Date: 7/29/2021  Postsurgical changes of interval orthopedic fixation of previously seen distal femoral metadiaphyseal fracture with the fracture fragments situated in gross anatomic alignment. XR FEMUR RIGHT (MIN 2 VIEWS)    Result Date: 7/28/2021  Comminuted fracture of the distal femoral metaphysis with impaction. XR KNEE RIGHT (3 VIEWS)    Result Date: 7/29/2021  Postsurgical changes of interval orthopedic fixation of previously seen distal femoral metadiaphyseal fracture with the fracture fragments situated in gross anatomic alignment. XR KNEE RIGHT (3 VIEWS)    Result Date: 7/28/2021  Comminuted impacted fracture of the distal femoral metaphysis.      XR TIBIA FIBULA RIGHT (2 VIEWS)    Result Date: 7/28/2021  Comminuted fracture of the distal femoral metaphysis with impaction. Diffuse soft tissue swelling. CT HEAD WO CONTRAST    Result Date: 7/28/2021  No acute intracranial abnormality. Stable compared to prior study     CT KNEE RIGHT WO CONTRAST    Result Date: 7/28/2021  Comminuted mildly displaced fracture of the distal femoral metadiaphysis as above. The bones appear demineralized. Small knee joint effusion. CT CHEST PULMONARY EMBOLISM W CONTRAST    Result Date: 7/30/2021  1. No evidence of acute pulmonary embolism or acute pulmonary abnormality. 2. Right hilar and right lower lung lobe chronic granulomatous disease. Consultations:    Consults:     Final Specialist Recommendations/Findings:   IP CONSULT TO ORTHOPEDIC SURGERY      The patient was seen and examined on day of discharge and this discharge summary is in conjunction with any daily progress note from day of discharge. Discharge plan:     Disposition: Skilled nursing facility    Physician Follow Up:     [place] [id number]  [address] [place] [address]       Requiring Further Evaluation/Follow Up POST HOSPITALIZATION/Incidental Findings: Continue current pain medications    Diet: regular diet    Activity: As tolerated    Instructions to Patient: Be compliant with your diet, medications, follow-up    Discharge Medications:      Medication List      START taking these medications    oxyCODONE-acetaminophen 5-325 MG per tablet  Commonly known as: Percocet  Take 1 tablet by mouth every 6 hours as needed for Pain for up to 7 days. Intended supply: 7 days.  Take lowest dose possible to manage pain        CHANGE how you take these medications    carBAMazepine 200 MG extended release capsule  Commonly known as: CARBATROL  Take 4 capsules by mouth 2 times daily  What changed:   · how much to take  · how to take this  · when to take this  · additional instructions     divalproex 250 MG DR tablet  Commonly known as: DEPAKOTE  Take 5 tablets by mouth 2 times daily  What changed:   · medication strength  · how much to take  · additional instructions  · Another medication with the same name was removed. Continue taking this medication, and follow the directions you see here. CONTINUE taking these medications    alendronate 70 MG tablet  Commonly known as: FOSAMAX  Take 1 tablet by mouth every 7 days     atorvastatin 40 MG tablet  Commonly known as: LIPITOR  Take 1 tablet by mouth nightly     bumetanide 1 MG tablet  Commonly known as: BUMEX  TAKE 1 TABLET BY MOUTH DAILY     naproxen 500 MG tablet  Commonly known as: NAPROSYN  TAKE 1 TABLET BY MOUTH TWICE A DAY WITH MEALS     Stool Softener 100 MG capsule  Generic drug: docusate sodium  TAKE 1 CAPSULE BY MOUTH TWICE A DAY AS NEEDED FOR CONSTIPATION           Where to Get Your Medications      These medications were sent to [facility], [facility] [phone] - f [phone]  [address] [place] [address] [facility] [address]    Phone: [phone]   · atorvastatin 40 MG tablet  · carBAMazepine 200 MG extended release capsule  · divalproex 250 MG DR tablet  · oxyCODONE-acetaminophen 5-325 MG per tablet         No discharge procedures on file. Time Spent on discharge is  40 mins in patient examination, evaluation, counseling as well as medication reconciliation, prescriptions for required medications, discharge plan and follow up. Electronically signed by   Alex Benitez MD  8/2/2021  12:01 PM      Thank you Dr. Royce Oneill DO for the opportunity to be involved in this patient's care.

## 2021-08-01 NOTE — PLAN OF CARE
Problem: Skin Integrity:  Goal: Will show no infection signs and symptoms  Description: Will show no infection signs and symptoms  7/31/2021 2316 by Steffen Fitzpatrick RN  Outcome: Ongoing     Problem: Skin Integrity:  Goal: Absence of new skin breakdown  Description: Absence of new skin breakdown  7/31/2021 2316 by Steffen Fitzpatrick RN  Outcome: Ongoing     Problem: Falls - Risk of:  Goal: Will remain free from falls  Description: Will remain free from falls  7/31/2021 2316 by Steffen Fitzpatrick RN  Outcome: Ongoing     Problem: Falls - Risk of:  Goal: Absence of physical injury  Description: Absence of physical injury  7/31/2021 2316 by Steffen Fitzpatrick RN  Outcome: Ongoing     Problem: Pain:  Goal: Pain level will decrease  Description: Pain level will decrease  7/31/2021 2316 by Steffen Fitzpatrick RN  Outcome: Ongoing     Problem: Pain:  Goal: Control of acute pain  Description: Control of acute pain  7/31/2021 2316 by Steffen Fitzpatrick RN  Outcome: Ongoing     Problem: Pain:  Goal: Control of chronic pain  Description: Control of chronic pain  7/31/2021 2316 by Steffen Fitzpatrick RN  Outcome: Ongoing     Problem: IP BALANCE  Goal: LTG - PATIENT WILL MAINTAIN SITTING POSITION WITH APPROPRIATE MID-LINE ORIENTATION  7/31/2021 2316 by Steffen Fitzpatrick RN  Outcome: Ongoing

## 2021-08-01 NOTE — PLAN OF CARE
Problem: Skin Integrity:  Goal: Will show no infection signs and symptoms  Description: Will show no infection signs and symptoms  Outcome: Ongoing  Goal: Absence of new skin breakdown  Description: Absence of new skin breakdown  Outcome: Ongoing     Problem: Falls - Risk of:  Goal: Will remain free from falls  Description: Will remain free from falls  Outcome: Ongoing  Goal: Absence of physical injury  Description: Absence of physical injury  Outcome: Ongoing     Problem: Pain:  Goal: Pain level will decrease  Description: Pain level will decrease  Outcome: Ongoing  Goal: Control of acute pain  Description: Control of acute pain  Outcome: Ongoing  Goal: Control of chronic pain  Description: Control of chronic pain  Outcome: Ongoing     Problem: IP BALANCE  Goal: LTG - PATIENT WILL MAINTAIN SITTING POSITION WITH APPROPRIATE MID-LINE ORIENTATION  Outcome: Ongoing

## 2021-08-02 ENCOUNTER — APPOINTMENT (OUTPATIENT)
Dept: GENERAL RADIOLOGY | Age: 76
DRG: 308 | End: 2021-08-02
Payer: MEDICAID

## 2021-08-02 VITALS
HEART RATE: 73 BPM | HEIGHT: 60 IN | TEMPERATURE: 97.5 F | SYSTOLIC BLOOD PRESSURE: 136 MMHG | DIASTOLIC BLOOD PRESSURE: 53 MMHG | OXYGEN SATURATION: 98 % | RESPIRATION RATE: 16 BRPM | BODY MASS INDEX: 26.31 KG/M2 | WEIGHT: 134 LBS

## 2021-08-02 LAB
HCT VFR BLD CALC: 25.3 % (ref 36.3–47.1)
HCT VFR BLD CALC: 25.6 % (ref 36.3–47.1)
HEMOGLOBIN: 7.9 G/DL (ref 11.9–15.1)
HEMOGLOBIN: 8 G/DL (ref 11.9–15.1)

## 2021-08-02 PROCEDURE — 6360000002 HC RX W HCPCS: Performed by: INTERNAL MEDICINE

## 2021-08-02 PROCEDURE — 2580000003 HC RX 258: Performed by: NURSE PRACTITIONER

## 2021-08-02 PROCEDURE — 97530 THERAPEUTIC ACTIVITIES: CPT

## 2021-08-02 PROCEDURE — 99239 HOSP IP/OBS DSCHRG MGMT >30: CPT | Performed by: INTERNAL MEDICINE

## 2021-08-02 PROCEDURE — 6370000000 HC RX 637 (ALT 250 FOR IP): Performed by: ORTHOPAEDIC SURGERY

## 2021-08-02 PROCEDURE — 6360000002 HC RX W HCPCS: Performed by: ORTHOPAEDIC SURGERY

## 2021-08-02 PROCEDURE — 85014 HEMATOCRIT: CPT

## 2021-08-02 PROCEDURE — 85018 HEMOGLOBIN: CPT

## 2021-08-02 PROCEDURE — 97535 SELF CARE MNGMENT TRAINING: CPT

## 2021-08-02 PROCEDURE — 36415 COLL VENOUS BLD VENIPUNCTURE: CPT

## 2021-08-02 RX ADMIN — ENOXAPARIN SODIUM 40 MG: 40 INJECTION SUBCUTANEOUS at 08:38

## 2021-08-02 RX ADMIN — ACETAMINOPHEN 650 MG: 325 TABLET ORAL at 13:06

## 2021-08-02 RX ADMIN — BUMETANIDE 1 MG: 1 TABLET ORAL at 08:39

## 2021-08-02 RX ADMIN — SODIUM CHLORIDE: 9 INJECTION, SOLUTION INTRAVENOUS at 05:41

## 2021-08-02 RX ADMIN — CARBAMAZEPINE 800 MG: 200 CAPSULE, EXTENDED RELEASE ORAL at 08:39

## 2021-08-02 RX ADMIN — KETOROLAC TROMETHAMINE 15 MG: 15 INJECTION, SOLUTION INTRAMUSCULAR; INTRAVENOUS at 05:16

## 2021-08-02 RX ADMIN — DIVALPROEX SODIUM 1250 MG: 500 TABLET, DELAYED RELEASE ORAL at 08:39

## 2021-08-02 RX ADMIN — KETOROLAC TROMETHAMINE 15 MG: 15 INJECTION, SOLUTION INTRAMUSCULAR; INTRAVENOUS at 13:06

## 2021-08-02 ASSESSMENT — PAIN DESCRIPTION - PAIN TYPE: TYPE: SURGICAL PAIN

## 2021-08-02 ASSESSMENT — PAIN SCALES - GENERAL: PAINLEVEL_OUTOF10: 8

## 2021-08-02 ASSESSMENT — PAIN DESCRIPTION - LOCATION: LOCATION: LEG;HIP

## 2021-08-02 ASSESSMENT — PAIN DESCRIPTION - ORIENTATION: ORIENTATION: RIGHT

## 2021-08-02 NOTE — CARE COORDINATION
Social work: spoke to Anita of [facility] and is now known as [facility]. Called and re=faxed everything from Friday and Saturday. Phone [phone] and Fax [phone]. Faxed thai and level of care obtained Friday evening. Faxed Saturday. Await ok from snf. Have ambulance on Will call.   Erick العلي

## 2021-08-02 NOTE — PLAN OF CARE
Problem: Skin Integrity:  Goal: Will show no infection signs and symptoms  Description: Will show no infection signs and symptoms  8/2/2021 1454 by Mike Duggan RN  Outcome: Completed  8/2/2021 1454 by Mike Duggan RN  Outcome: Ongoing  8/2/2021 0936 by Mike Duggan RN  Outcome: Ongoing  Goal: Absence of new skin breakdown  Description: Absence of new skin breakdown  8/2/2021 1454 by Mike Duggan RN  Outcome: Completed  8/2/2021 1454 by Mike Duggan RN  Outcome: Ongoing  8/2/2021 0936 by Mike Duggan RN  Outcome: Ongoing     Problem: Falls - Risk of:  Goal: Will remain free from falls  Description: Will remain free from falls  8/2/2021 1454 by Mike Duggan RN  Outcome: Completed  8/2/2021 1454 by Mike Duggan RN  Outcome: Ongoing  8/2/2021 0936 by Mike Duggan RN  Outcome: Ongoing  Goal: Absence of physical injury  Description: Absence of physical injury  8/2/2021 1454 by Mike Duggan RN  Outcome: Completed  8/2/2021 1454 by Mike Duggan RN  Outcome: Ongoing  8/2/2021 0936 by Mike Duggan RN  Outcome: Ongoing     Problem: Pain:  Goal: Pain level will decrease  Description: Pain level will decrease  8/2/2021 1454 by Mike Duggan RN  Outcome: Completed  8/2/2021 1454 by Mike Duggan RN  Outcome: Ongoing  8/2/2021 0936 by Mike Duggan RN  Outcome: Ongoing  Goal: Control of acute pain  Description: Control of acute pain  8/2/2021 1454 by Mike Duggan RN  Outcome: Completed  8/2/2021 1454 by Mike Duggan RN  Outcome: Ongoing  8/2/2021 0936 by Mike Duggan RN  Outcome: Ongoing  Goal: Control of chronic pain  Description: Control of chronic pain  8/2/2021 1454 by Mike Duggan RN  Outcome: Completed  8/2/2021 1454 by Mike Duggan RN  Outcome: Ongoing  8/2/2021 0936 by Mike Duggan RN  Outcome: Ongoing     Problem: IP BALANCE  Goal: LTG - PATIENT WILL MAINTAIN SITTING POSITION WITH APPROPRIATE MID-LINE ORIENTATION  8/2/2021 1454 by Mike Duggan RN  Outcome: Completed  8/2/2021 1454 by Mike Duggan, RN  Outcome: Ongoing  8/2/2021 0936 by Rona Chavez RN  Outcome: Ongoing

## 2021-08-02 NOTE — PROGRESS NOTES
Physical Therapy  Facility/Department: [facility]  Daily Treatment Note  NAME: Austen Ortega  : 1945  MRN: [id number]    Date of Service: 2021    Discharge Recommendations:  Patient would benefit from continued therapy after discharge     Pt currently functioning below baseline. Would suggest additional therapy at time of discharge to maximize long term outcomes and prevent re-admission. Please refer to AM-PAC score for current level of function. Assessment   Body structures, Functions, Activity limitations: Decreased functional mobility ; Decreased safe awareness;Decreased balance;Decreased endurance; Increased pain;Decreased strength;Decreased cognition  Assessment: Patient requires max assist x 2 for all bed mobility and not appropriate to stand at this time. Paitent very pleasant and agreeable to work with therapy. At end of visit patient asked \" did I do good? \". Therapist gave patient encouragement and praised her for working very hard. Patient was very pleased with the compliment. Patient will benefit from continued PT to improve to prior level of function. Decision Making: High Complexity  Patient Education: Attempted to educated patient to importance of moving, but no retention of education  REQUIRES PT FOLLOW UP: Yes  Activity Tolerance  Activity Tolerance: Patient limited by pain; Patient limited by cognitive status     Patient Diagnosis(es): The encounter diagnosis was Closed fracture of right femur, unspecified fracture morphology, unspecified portion of femur, initial encounter ([facility] [address] has a past medical history of Anxiety, Arthritis, Cataracts, bilateral, Essential (primary) hypertension, Fracture (healed) treatment follow-up, Mental retardation, Movement disorder, Primary osteoarthritis involving multiple joints, Rheumatoid arthritis ([address] and Seizures ([address] has a past surgical history that includes Hip fracture surgery (Right, 14);  Leg Surgery (Right, 2018); pr treat tibial shaft fx, intramed implant (Right, 7/18/2018); and Femur fracture surgery (Right, 7/29/2021). Restrictions  Restrictions/Precautions  Restrictions/Precautions: Weight Bearing, Fall Risk, Up as Tolerated  Required Braces or Orthoses?: No  Lower Extremity Weight Bearing Restrictions  Right Lower Extremity Weight Bearing: Non Weight Bearing  Position Activity Restriction  Other position/activity restrictions: R LE NWB, heels off bed, external catheter, R UE IV  Subjective   General  Chart Reviewed: Yes  Additional Pertinent Hx: MRDD  Response To Previous Treatment: Patient with no complaints from previous session. Family / Caregiver Present: No  Subjective  Subjective: Patient very pleasant and agreeable to PT. General Comment  Comments: Lucia Whipple RN reports patient is medically appropriate for PT. Orientation  Orientation  Orientation Level: Oriented to person  Cognition   Cognition  Overall Cognitive Status: Exceptions  Arousal/Alertness: Appropriate responses to stimuli  Following Commands: Inconsistently follows commands; Does not follow commands  Attention Span: Difficulty attending to directions  Memory: Decreased recall of biographical Information;Decreased recall of precautions;Decreased recall of recent events;Decreased short term memory;Decreased long term memory  Safety Judgement: Decreased awareness of need for assistance;Decreased awareness of need for safety  Problem Solving: Decreased awareness of errors  Insights: Decreased awareness of deficits  Initiation: Requires cues for all  Sequencing: Requires cues for all  Cognition Comment: Patient very pleasant and trying hard, but cognitive deficitis make it difficult for patient to follow directions.   Objective   Bed mobility  Rolling to Left: Maximum assistance;2 Person assistance  Rolling to Right: Maximum assistance;2 Person assistance  Supine to Sit: Maximum assistance;Dependent/Total;2 Person assistance  Sit to Supine: Maximum assistance;Dependent/Total;2 Person assistance  Scooting: Maximal assistance;Dependent/Total;2 Person assistance  Comment: Patient requires max verbal and tactiles cues. Patient making good attempt to follow directions, but limited by cognition. Transfers  Comment: Not appropriate at this time  Ambulation  Ambulation?: No     Balance  Sitting - Static: Poor;+  Sitting - Dynamic: Poor  Comments: Posterior lean in sitting initially, improved over time, used BUE for support in sitting. OutComes Score    AM-PAC Score  AM-PAC Inpatient Mobility Raw Score : 8 (08/02/21 1043)  AM-PAC Inpatient T-Scale Score : 28.52 (08/02/21 1043)  Mobility Inpatient CMS 0-100% Score: 86.62 (08/02/21 1043)  Mobility Inpatient CMS G-Code Modifier : CM (08/02/21 1043)          Goals  Short term goals  Time Frame for Short term goals: 8 visits  Short term goal 1: Min A bed mobility  Short term goal 2: Patient to sit EOB x10  minutes with min A  Short term goal 3: Patient to perform 10reps R LE ex  Short term goal 4: Standing and ambulation to be assessed  if patient becomes appropriate  Patient Goals   Patient goals : to have less pain    Plan    Plan  Times per week: 1-2x/day for 5-6 days/week  Current Treatment Recommendations: Transfer Training, Strengthening, Balance Training, Safety Education & Training, Patient/Caregiver Education & Training  Safety Devices  Type of devices: Nurse notified, Call light within reach, Bed alarm in place, All fall risk precautions in place, Left in bed   Co-treatment with OT warranted secondary to decreased safety and independence requiring 2 skilled therapy professionals to address individual discipline's goals. PT addressing pre gait trunk strengthening and postural control in sitting.   Therapy Time   Individual Concurrent Group Co-treatment   Time In 0933         Time Out 0951         Minutes [address] [place], [place]

## 2021-08-02 NOTE — PROGRESS NOTES
Occupational Therapy  Facility/Department: [facility] MED SURG  Daily Treatment Note  NAME: Cindy Bean  : 1945  MRN: [id number]    Date of Service: 2021    Discharge Recommendations:  Patient would benefit from continued therapy after discharge       Assessment   Performance deficits / Impairments: Decreased functional mobility ; Decreased safe awareness;Decreased ROM; Decreased strength;Decreased endurance;Decreased high-level IADLs;Decreased fine motor control;Decreased cognition;Decreased posture;Decreased balance;Decreased coordination;Decreased ADL status  Assessment: Skilled OT services are indicated to Maximize functional I and safety to return home at prior level as able with assist.  Prognosis: Poor; Fair  REQUIRES OT FOLLOW UP: Yes  Activity Tolerance  Activity Tolerance: Patient limited by pain;Treatment limited secondary to decreased cognition  Activity Tolerance: poor  Safety Devices  Safety Devices in place: Yes  Type of devices: All fall risk precautions in place; Left in bed;Bed alarm in place;Call light within reach;Nurse notified; Patient at risk for falls         Patient Diagnosis(es): The encounter diagnosis was Closed fracture of right femur, unspecified fracture morphology, unspecified portion of femur, initial encounter ([facility] [address] has a past medical history of Anxiety, Arthritis, Cataracts, bilateral, Essential (primary) hypertension, Fracture (healed) treatment follow-up, Mental retardation, Movement disorder, Primary osteoarthritis involving multiple joints, Rheumatoid arthritis ([address] and Seizures ([address] has a past surgical history that includes Hip fracture surgery (Right, 14); Leg Surgery (Right, 2018); pr treat tibial shaft fx, intramed implant (Right, 2018); and Femur fracture surgery (Right, 2021).     Restrictions  Restrictions/Precautions  Restrictions/Precautions: Weight Bearing, Fall Risk, Up as Tolerated  Required Braces or Orthoses?: No  Lower Extremity Weight Bearing Restrictions  Right Lower Extremity Weight Bearing: Non Weight Bearing  Position Activity Restriction  Other position/activity restrictions: R LE NWB, heels off bed, external catheter, R UE IV  Subjective   General  Chart Reviewed: Yes  Patient assessed for rehabilitation services?: Yes  Response to previous treatment: Patient with no complaints from previous session  Family / Caregiver Present: No  Subjective  Subjective: Pt in bed, agreeable to therapy      Orientation  Orientation  Overall Orientation Status: Impaired  Orientation Level: Oriented to person  Objective    ADL  LE Dressing: Dependent/Total  Additional Comments: Pt is limited by physical and cognitive deficits. Balance  Sitting Balance: Maximum assistance (on EOB) Pt has poor tolerance for sitting EOB due to pain. Functional Mobility  Functional Mobility Comments: UNABLE  Bed mobility  Rolling to Left: Maximum assistance;2 Person assistance  Rolling to Right: Maximum assistance;2 Person assistance  Supine to Sit: Maximum assistance;Dependent/Total;2 Person assistance  Sit to Supine: Maximum assistance;Dependent/Total;2 Person assistance  Scooting: Maximal assistance;Dependent/Total;2 Person assistance  Comment: MAX verbal/tactile/physical cues provided to participate/assist with bed mobility with poor follow thru due to cognitive deficits. Transfers  Stand Step Transfers: Unable to assess  Sit to stand: Unable to assess  Stand to sit: Unable to assess  Transfer Comments: UNABLE                       Cognition  Overall Cognitive Status: Exceptions  Arousal/Alertness: Appropriate responses to stimuli  Following Commands: Inconsistently follows commands; Does not follow commands  Attention Span: Difficulty attending to directions  Memory: Decreased recall of biographical Information;Decreased recall of precautions;Decreased recall of recent events;Decreased short term memory;Decreased long term memory  Safety Judgement: Decreased awareness of need for assistance;Decreased awareness of need for safety  Problem Solving: Decreased awareness of errors  Insights: Decreased awareness of deficits  Initiation: Requires cues for all  Sequencing: Requires cues for all     Perception  Overall Perceptual Status: Impaired  Initiation: Hand over hand to initiate tasks  Motor Planning: Hand over hand to sequence tasks                                   Plan   Plan  Times per week: 4-5x/wk 1x/day as cecily  Current Treatment Recommendations: Strengthening, Endurance Training, Safety Education & Training, Positioning, Pain Management, Home Management Training, Self-Care / ADL, Equipment Evaluation, Education, & procurement, Cognitive Reorientation, Patient/Caregiver Education & Training, Neuromuscular Re-education    AM-PAC Score        AM-PAC Inpatient Daily Activity Raw Score: 11 (08/02/21 1020)  AM-PAC Inpatient ADL T-Scale Score : 29.04 (08/02/21 1020)  ADL Inpatient CMS 0-100% Score: 70.42 (08/02/21 1020)  ADL Inpatient CMS G-Code Modifier : CL (08/02/21 1020)    Goals  Short term goals  Time Frame for Short term goals: By discharge, pt to demo  Short term goal 1: bed mobility to Min A of 1 with use of bedrails as needed. Short term goal 2: increased sitting cecily > 10 min to increase participation in functional tasks. Short term goal 3: tolerance for resassessment of functional mobility and ADL transfers if appropriate to add goal to POC. Short term goal 4: increased B UE strength by 1/2 grade to assist with functional tasks/caregivers I with simple BUE HEP with use of handouts as needed. Short term goal 5: grooming task to Min A while seated with use of AD/AE as needed. Patient Goals   Patient goals : Pt unable to state.        Therapy Time   Individual Concurrent Group Co-treatment   Time In       0933   Time Out       0950   Minutes       16     Co-treatment with PT warranted secondary to decreased safety and independence requiring 2 skilled therapy professionals to address individual discipline's goals. OT addressing \"preparation for ADL transfer\",\"sitting balance for increased ADL performance\",\"sitting/activity tolerance\",\"functional reaching\",\"environmental safety/scanning\",\"fall prevention\",\"functional mobility for ADL transfers\",\"ability to sequence and follow directions\",\"functional UE strength\".          Ha Corona, ESTELA

## 2021-08-02 NOTE — PLAN OF CARE
Problem: Skin Integrity:  Goal: Will show no infection signs and symptoms  Description: Will show no infection signs and symptoms  8/2/2021 0936 by Jayne Villasenor RN  Outcome: Ongoing  8/2/2021 0052 by Rubi Sampson RN  Outcome: Ongoing  Note: Checked for incontinence every 2 hours and prn. Pericare as needed. Assisted to reposition off back frequently. On waffle mattress. Will continue to monitor. 8/1/2021 1941 by Ptaricio Lee RN  Outcome: Ongoing  Goal: Absence of new skin breakdown  Description: Absence of new skin breakdown  8/2/2021 0936 by Jayne Villasenor RN  Outcome: Ongoing  8/1/2021 1941 by Patricio Lee RN  Outcome: Ongoing     Problem: Falls - Risk of:  Goal: Will remain free from falls  Description: Will remain free from falls  8/2/2021 0936 by Jayne Villasenor RN  Outcome: Ongoing  8/2/2021 0052 by Rubi Sampson RN  Outcome: Ongoing  Note: Siderails up x 2  Hourly rounding  Call light in reach  Instructed to call for assist before attempting out of bed. Remains free from falls and accidental injury at this time   Floor free from obstacles  Bed is locked and in lowest position  Adequate lighting provided  Bed alarm on, Red [place]alling star and Stay with Me signs posted  Will continue to monitor. 8/1/2021 1941 by Patricio Lee RN  Outcome: Ongoing  Goal: Absence of physical injury  Description: Absence of physical injury  8/2/2021 0936 by Jayne Villasenor RN  Outcome: Ongoing  8/1/2021 1941 by Patricio Lee RN  Outcome: Ongoing     Problem: Pain:  Goal: Pain level will decrease  Description: Pain level will decrease  8/2/2021 0936 by Jayne Villasenor RN  Outcome: Ongoing  8/1/2021 1941 by Patricio Lee RN  Outcome: Ongoing  Goal: Control of acute pain  Description: Control of acute pain  8/2/2021 0936 by Jayne Villasenor RN  Outcome: Ongoing  8/2/2021 0052 by Rubi Sampson RN  Outcome: Ongoing  Note: Pain level assessment complete.    Patient educated on pain scale and control interventions  PRN pain medication given per patient request  Patient instructed to call out with new onset of pain or unrelieved pain  Will continue to monitor. 8/1/2021 1941 by Ellie Ray RN  Outcome: Ongoing  Goal: Control of chronic pain  Description: Control of chronic pain  8/2/2021 0936 by Mona Silva RN  Outcome: Ongoing  8/1/2021 1941 by Ellie Ray RN  Outcome: Ongoing     Problem: IP BALANCE  Goal: LTG - PATIENT WILL MAINTAIN SITTING POSITION WITH APPROPRIATE MID-LINE ORIENTATION  8/2/2021 0936 by Mona Silva RN  Outcome: Ongoing  8/1/2021 1941 by Ellie Ray RN  Outcome: Ongoing   Patient is a fall risk during this admission. Fall risk assessment was performed. Patient is absent of falls. Bed is in the lowest position. Wheels on the bed are locked. Call light and bed side table are within reach. Clutter is removed. Patient was educated to call out when needing assistance or wanting to get out of bed. Patient offered toileting assistance during rounding. Hourly rounds have been performed. Pt medicated with pain medication prn. Assessed all pain characteristics including level, type, location, frequency, and onset. Non-pharmacologic interventions offered to pt as well. Pt states pain is tolerable at this time.  Will continue to monitor

## 2021-08-02 NOTE — PLAN OF CARE
Problem: Skin Integrity:  Goal: Will show no infection signs and symptoms  Description: Will show no infection signs and symptoms  8/2/2021 0052 by Cadence Sears RN  Outcome: Ongoing  Note: Checked for incontinence every 2 hours and prn. Pericare as needed. Assisted to reposition off back frequently. On waffle mattress. Will continue to monitor. Problem: Falls - Risk of:  Goal: Will remain free from falls  Description: Will remain free from falls  8/2/2021 0052 by Cadence Sears RN  Outcome: Ongoing  Note: Siderails up x 2  Hourly rounding  Call light in reach  Instructed to call for assist before attempting out of bed. Remains free from falls and accidental injury at this time   Floor free from obstacles  Bed is locked and in lowest position  Adequate lighting provided  Bed alarm on, Red Falling star and Stay with Me signs posted  Will continue to monitor. Problem: Pain:  Goal: Control of acute pain  Description: Control of acute pain  8/2/2021 0052 by Cadence Sears RN  Outcome: Ongoing  Note: Pain level assessment complete. Patient educated on pain scale and control interventions  PRN pain medication given per patient request  Patient instructed to call out with new onset of pain or unrelieved pain  Will continue to monitor.

## 2021-08-04 RX ORDER — ALENDRONATE SODIUM 70 MG/1
70 TABLET ORAL
Qty: 25 TABLET | Refills: 0 | Status: SHIPPED | OUTPATIENT
Start: 2021-08-04 | End: 2022-01-01

## 2021-08-04 NOTE — TELEPHONE ENCOUNTER
Isabell Christine is calling to request a refill on the following medication(s):    Medication Request:  Requested Prescriptions     Pending Prescriptions Disp Refills    alendronate (FOSAMAX) 70 MG tablet 4 tablet 11     Sig: Take 1 tablet by mouth every 7 days       Last Visit Date (If Applicable):  0/0/5571 In office    Next Visit Date:    1/12/2022

## 2021-08-23 DIAGNOSIS — S72.491D CLOSED COMMINUTED INTRA-ARTICULAR FRACTURE OF DISTAL END OF RIGHT FEMUR WITH ROUTINE HEALING, SUBSEQUENT ENCOUNTER: Primary | ICD-10-CM

## 2021-08-25 ENCOUNTER — OFFICE VISIT (OUTPATIENT)
Dept: ORTHOPEDIC SURGERY | Age: 76
End: 2021-08-25

## 2021-08-25 DIAGNOSIS — S72.491G: Primary | ICD-10-CM

## 2021-08-25 PROCEDURE — 99024 POSTOP FOLLOW-UP VISIT: CPT | Performed by: ORTHOPAEDIC SURGERY

## 2021-08-25 RX ORDER — OXYCODONE HYDROCHLORIDE AND ACETAMINOPHEN 325; 5 MG/5ML; MG/5ML
SOLUTION ORAL EVERY 4 HOURS PRN
COMMUNITY
End: 2021-01-01

## 2021-08-25 NOTE — PROGRESS NOTES
[facility]  [facility]  [address] [address]  Dept: [phone]  Dept Fax: [phone]        Orthopaedic Clinic Follow Up      Subjective:   Date of Surgery: 7/29/21    Austen Ortega is a 77y.o. year old female who presents to the clinic today for routine follow up approximately 4 weeks postop from ORIF of a right distal femur fracture with significant comminution and intra-articular extension. She has been being treated at an extended care facility. Sutures were removed at her facility. They deny any issues, and are changing her dressings regularly, she still has a little bit of serosanguineous ooze from these surgical site incision. She is minimally coherent and much of the history was received from the owner of her group home and nurse at her extended care facility. There was no new falls or trauma to the right lower extremity. She has been nonweightbearing in a wheelchair. Review of Systems  Gen: no fever, chills, malaise  CV: no chest pain or palpitations  Resp: no cough or shortness of breath  GI: no nausea, vomiting, diarrhea, or constipation  Neuro: no seizures, vertigo, or headache  Msk: Right leg pain. 10 remaining systems reviewed and negative    Objective : There were no vitals filed for this visit. There is no height or weight on file to calculate BMI. General: No acute distress, resting comfortably in the clinic  Neuro: alert. oriented  Eyes: Extra-ocular muscles intact  Pulm: Respirations unlabored and regular. Skin: warm, well perfused  Psych:   Patient has good fund of knowledge and displays understanding of exam, diagnosis, and plan. MSK:  Right leg: Incisions about the lateral aspect of the thigh are well approximated without signs of infection, including erythema or active drainage. There is some serosanguineous ooze on her current dressings which were changed yesterday's.   There are sites of granulation tissue at the proximal and distal aspects of her incision about 2 x 1 mm in area. There is no active drainage with intent to express fluid from these wound sites. She has significant edema distally bilaterally. Neurovascularly intact although unable to assess motor function due to patient mentation. Radiology:  History: Right distal femur fracture status post ORIF    Comparison: 7/29/2021    Findings: 2 views of the right femur (AP, lateral) in a skeletally mature patient showing previous distal femur fracture with significant comminution and intra-articular extension. No overall change in fracture alignment with interval callus healing. No evidence of hardware backing out or failing. Impression: Interval healing of distal femur fracture treated with ORIF. Assessment:   77y.o. year old female with right distal femur fracture approximately 4 weeks postop from ORIF. Plan:   Overall, patient is doing well. She is still endorses right leg pain. Her dressings are being changed regularly, which I would continue to recommend until there is no more serosanguineous ooze on her dressings. She may weight-bear as tolerated on the right lower extremity. We will see her back in 2 weeks with new x-rays. Follow up:Return in about 2 weeks (around 9/8/2021) for follow-up with x-rays. No orders of the defined types were placed in this encounter. No orders of the defined types were placed in this encounter. Electronically signed by Santiago Manuel MD PGY-2 on 8/25/2021 at 9:39 AM    This note is created with the assistance of a speech recognition program.  While intending to generate a document that actually reflects the content of the visit, the document can still have some errors including those of syntax and sound a like substitutions which may escape proof reading.   In such instances, actual meaning can be extrapolated by contextual diversion

## 2021-09-03 DIAGNOSIS — S72.491D CLOSED COMMINUTED INTRA-ARTICULAR FRACTURE OF DISTAL END OF RIGHT FEMUR WITH ROUTINE HEALING, SUBSEQUENT ENCOUNTER: Primary | ICD-10-CM

## 2021-09-07 ENCOUNTER — OFFICE VISIT (OUTPATIENT)
Dept: ORTHOPEDIC SURGERY | Age: 76
End: 2021-09-07

## 2021-09-07 DIAGNOSIS — S72.491D CLOSED COMMINUTED INTRA-ARTICULAR FRACTURE OF DISTAL END OF RIGHT FEMUR WITH ROUTINE HEALING, SUBSEQUENT ENCOUNTER: Primary | ICD-10-CM

## 2021-09-07 PROCEDURE — 99024 POSTOP FOLLOW-UP VISIT: CPT | Performed by: ORTHOPAEDIC SURGERY

## 2021-09-08 NOTE — PROGRESS NOTES
This patient had undergone open reduction internal fixation of intermittent supracondylar fracture right femur. The patient also had previous right hip replacement and right tibial nail fixation for fracture. The patient is at group home. She is getting some physical therapy but we talked with the group home people and says she hardly does any weightbearing and there is significant problem with the communication with the patient. She barely able to stand up. They do some range of motion exercises. The caretaker who came with her told me that she they are trying to get her to go home and would rather have home physical therapy. Advised that the outpatient therapy will get better even if it is twice a week then inpatient therapy. Examination: The wound remains solidly healed there are couple of small dry scabs over the distal incision. She is able to do straight leg raising and is a flexion to about 60 degrees. Passively it can be extended to almost 5 degrees. X-rays: Further 4 views of the right thigh were taken and they show the implant is intact. There is no significant periosteal callus formation either medially or posteriorly. Diagnosis: Status post ORIF supracondylar fracture right femur. Treatment: Advised to continue physical therapy weightbearing as tolerated and ambulation possible. We will see her again in 4 weeks time at that time should have further 3 views of the right knee. To be AP lateral internal oblique. Initially in the distal third of the femur.

## 2021-09-21 ENCOUNTER — TELEPHONE (OUTPATIENT)
Dept: FAMILY MEDICINE CLINIC | Age: 76
End: 2021-09-21

## 2021-09-21 NOTE — TELEPHONE ENCOUNTER
----- Message from [facility] sent at 9/21/2021  9:10 AM EDT -----  Subject: Message to Provider    QUESTIONS  Information for Provider? Roxy Rodrigez calling from [address] wants to   know if Dr. Coty Byers will follow the patient for home health and does the   patient has any other insurance than Medicaid because of her age. Roxy Rodrigez   wants to know if patient also has traditional Medicare. Roxy Rodrigez says the   patient needs home health because she had a fracture under her right femur   she was at [facility]. Roxy Rodrigez states she will be getting home health   for pt/ot/skill nursing/s. Please call Roxy Rodrigez at [phone]. Pt was in   rehab 8/2&get d/c 9/22.  ---------------------------------------------------------------------------  --------------  CALL BACK INFO  What is the best way for the office to contact you? OK to leave message on   voicemail  Preferred Call Back Phone Number? [phone]  ---------------------------------------------------------------------------  --------------  SCRIPT ANSWERS  Relationship to Patient? Third Party  Representative Name?  [id number]

## 2021-09-22 NOTE — TELEPHONE ENCOUNTER
Need a verbal order for homecare.   Discharging from [address] and they will be starting physical therapy

## 2021-09-23 NOTE — TELEPHONE ENCOUNTER
----- Message from Gris Dunlap sent at 9/23/2021  9:50 AM EDT -----  Subject: Message to Provider    QUESTIONS  Information for Provider? Taj Reddy from [facility] calling to check the status   of Homecare and starting Physical therapy.  ---------------------------------------------------------------------------  --------------  Dagoberto RIGGS  What is the best way for the office to contact you? OK to leave message on   voicemail  Preferred Call Back Phone Number?  [phone]  ---------------------------------------------------------------------------  --------------  SCRIPT ANSWERS  undefined

## 2021-09-23 NOTE — TELEPHONE ENCOUNTER
----- Message from [address] sent at 9/23/2021 10:12 AM EDT -----  Subject: Message to Provider    QUESTIONS  Information for Provider? Omar Parr is calling from patients group home   saying she was at a facility for rehab and had 2 incident were staff   tested positive. Omar Parr has not put her back in the home yet and wants to   get her tested before she bring in back into home around other. Patient is   not showing symptoms. They would like to get her tested or know the steps   she needs to do as far to quarantine since she is not showing any covid   symptom. Please call Omar Parr at [phone]  ---------------------------------------------------------------------------  --------------  Omelia Counter INFO  What is the best way for the office to contact you? OK to leave message on   voicemail  Preferred Call Back Phone Number? [phone]  ---------------------------------------------------------------------------  --------------  SCRIPT ANSWERS  Relationship to Patient? Third Party  Representative Name?  Omar Parr

## 2021-09-27 NOTE — TELEPHONE ENCOUNTER
Miss Misbah Bolanos is fully vaccinated. According to the CDC, if youve had close contact with someone who has COVID-19, you should get tested 3-5 days after your exposure, even if you dont have symptoms. You should also wear a mask indoors in public for 14 days following exposure or until your test result is negative. You should isolate for 10 days if your test result is positive.

## 2021-10-05 NOTE — PROGRESS NOTES
Chief Complaint   Patient presents with    Follow-up     RT FEMUR [address] [place] 07/29/2021 - ORIF    This patient who had undergone open reduction internal fixation of supracondylar fracture of the right femur on 7/29/2021 is seen here in follow-up. The attendant who came with her says that the patient complains of pain in the knee. And there has been some draining from the incisions. She also told me that the place where she is now they are having physical therapy trying to stand her but it is difficult. Examination: The main incisions are all well-healed. There are 2 small areas distally which are superficially open. The surrounding area does not show any erythema. There was not particularly the drainage but only staining on the dressing. X-rays: 2 views show that in fact the hardware remains intact. In the lateral view there is a suggestion that the comminuted piece may be incorporating. Diagnosis: Supracondylar fracture right femur. Treatment: Continue with just small Band-Aid dressings. Do not require any specific visiting nurse to do this. I will see her again in 4 weeks time with 3 views of the right knee.

## 2021-10-05 NOTE — TELEPHONE ENCOUNTER
----- Message from [facility] sent at 10/4/2021  5:05 PM EDT -----  Subject: Message to Provider    QUESTIONS  Information for Provider? Morris Serra from the [facility] is calling   because patient has an opening near her womb and it's two holes that are   leaking. she will be going out to see her twice a week until November 26   or until the womb heals up.   ---------------------------------------------------------------------------  --------------  [address]  What is the best way for the office to contact you? OK to leave message on   voicemail  Preferred Call Back Phone Number? [phone]  ---------------------------------------------------------------------------  --------------  SCRIPT ANSWERS  Relationship to Patient? Third Party  Representative Name?  Morris Serra

## 2021-10-07 NOTE — PROGRESS NOTES
Subjective:  Justine James presents for   Chief Complaint   Patient presents with    Mass     blisters on back of upper legs. Recently had a closed comminuted intra-articular fracture of distal end of right femur this was repaired through femur open reduction internal fixation. This was done at the end of July. She is here for blisters and itching of the skin. Patient Active Problem List   Diagnosis    Seizure disorder    Mental retardation    Anxiety    Constipation    Age-related osteoporosis with current pathological fracture with routine healing    Dependence on wheelchair    Difficulty walking    Bilateral lower extremity edema    Essential (primary) hypertension    Closed comminuted intra-articular fracture of distal end of right femur ([address]    Closed fracture of right femur ([address]         Review of Systems   Unable to perform ROS: Other (History was obtained from caregivers)   Skin: Negative for color change. Objective:  Physical Exam   Vitals:   Vitals:    10/07/21 0954   BP: 136/64   Pulse: 81   SpO2: 94%     Wt Readings from Last 3 Encounters:   10/05/21 134 lb (60.8 kg)   07/28/21 134 lb (60.8 kg)   03/25/21 140 lb (63.5 kg)     Ht Readings from Last 3 Encounters:   10/05/21 5' (1.524 m)   07/28/21 5' (1.524 m)   03/25/21 5' 3\" (1.6 m)     There is no height or weight on file to calculate BMI. Physical Exam  Skin:     General: Skin is warm and dry. Comments: Well-healing scar of the right leg. There is dry skin of periwound and one blister. No rashes, vesicular lesions, open wounds or ulcers were noted            Assessment/Plan:    Diagnosis Orders   1. Dryness of periwound skin  zinc oxide (DESITIN) 40 % ointment   2. Immunization due  INFLUENZA, QUADV, ADJUVANTED, 65 YRS =, IM, PF, PREFILL SYR, 0.5ML (FLUAD)        Return in about 2 weeks (around 10/21/2021), or if symptoms worsen or fail to improve, for Dry skin of periwound.      Currently has dryness of periwound

## 2021-11-01 NOTE — TELEPHONE ENCOUNTER
Pharmacy says they did not receive these          Sharyle  is calling to request a refill on the following medication(s):    Medication Request:  Requested Prescriptions     Pending Prescriptions Disp Refills    carBAMazepine (CARBATROL) 200 MG extended release capsule 240 capsule 3     Sig: TAKE 4 CAPSULES BY MOUTH TWICE A DAY    bumetanide (BUMEX) 1 MG tablet 30 tablet 5     Sig: Take 1 tablet by mouth daily    divalproex (DEPAKOTE) 500 MG DR tablet 120 tablet 3     Sig: TAKE 2 TABLETS BY MOUTH TWICE A DAY    divalproex (DEPAKOTE) 250 MG DR tablet 60 tablet 3     Sig: TAKE 1 TABLET BY MOUTH TWICE A DAY       Last Visit Date (If Applicable):  3/4/2158    Next Visit Date:    1/12/2022

## 2021-11-01 NOTE — TELEPHONE ENCOUNTER
Blane Coppola, a pharmacist from [facility] called the office this afternoon. Blane Coppola said she had called our office previously stating that Madonna Torres was back in her home and needed new seizure Rx's for a new bubble pack. She sent over the requests and they came back denied. Looking at the chart I told her that I did see this, however I was showing that they were approved - 10/29/2021 for Depakote  mg, Depakote  mg and Carbatrol 200 mg. I spoke with my  Mary Wood and she approved that I verbally give these Rx's the pharmacist.  Blane Coppola verbally repeated each Rx back to me.

## 2021-11-01 NOTE — TELEPHONE ENCOUNTER
Mary Ellen Clayton is calling to request a refill on the following medication(s):    Medication Request:  Requested Prescriptions     Pending Prescriptions Disp Refills    bumetanide (BUMEX) 1 MG tablet [Pharmacy Med Name: Bumetanide 1MG TABS] 30 tablet 5     Sig: TAKE 1 TABLET BY MOUTH DAILY       Last Visit Date (If Applicable):  6/5/2396    Next Visit Date:    1/12/2022

## 2021-11-01 NOTE — TELEPHONE ENCOUNTER
Marcellus Chávez is calling to request a refill on the following medication(s):    Medication Request:  Requested Prescriptions     Pending Prescriptions Disp Refills    carBAMazepine (CARBATROL) 200 MG extended release capsule 240 capsule 3     Sig: TAKE 4 CAPSULES BY MOUTH TWICE A DAY    bumetanide (BUMEX) 1 MG tablet 30 tablet 5     Sig: Take 1 tablet by mouth daily    divalproex (DEPAKOTE) 500 MG DR tablet 120 tablet 5     Sig: TAKE 2 TABLETS BY MOUTH TWICE A DAY    divalproex (DEPAKOTE) 250 MG DR tablet 60 tablet 5     Sig: TAKE 1 TABLET BY MOUTH TWICE A DAY       Last Visit Date (If Applicable):  4/9/0730    Next Visit Date:    1/12/2022

## 2021-11-01 NOTE — TELEPHONE ENCOUNTER
Td Nicole is calling to request a refill on the following medication(s):    Medication Request:  Requested Prescriptions     Pending Prescriptions Disp Refills    carBAMazepine (CARBATROL) 200 MG extended release capsule 240 capsule 3     Sig: TAKE 4 CAPSULES BY MOUTH TWICE A DAY    bumetanide (BUMEX) 1 MG tablet 30 tablet 5     Sig: Take 1 tablet by mouth daily    divalproex (DEPAKOTE) 500 MG DR tablet 120 tablet 5     Sig: TAKE 2 TABLETS BY MOUTH TWICE A DAY    divalproex (DEPAKOTE) 250 MG DR tablet 60 tablet 5     Sig: TAKE 1 TABLET BY MOUTH TWICE A DAY       Last Visit Date (If Applicable):  3/9/1119    Next Visit Date:    1/12/2022

## 2021-11-16 NOTE — TELEPHONE ENCOUNTER
Pt has small open wound on right leg. Nurse has been doing dressing changes, and noticed there is a purulent drainage coming out of wound. puracol  ag dressings are what they are using. Do you want to see her? Call in an antibiotic? Also pt hurt her left great toe.   She will be going to the podiatrist

## 2021-11-16 NOTE — PROGRESS NOTES
Subjective:      Patient ID: Nicole Patton is a 77 y.o. female. HPI  Active Problem developmental delay with seizure disorder . Last seizure in September 2020 . Seizure type is described as staring per group home aide . The condition is group aide report no seizure activity since last seen remaining on depakote 1250 mg po bid and carbatrol 800 mg po bid . She is not as sleepy during the day at times refusing to take all her medication . There is concern that she that she was sleepy from medication doing well with morning dose with supper dose moved to bedtime not having any further problem with sleepiness per group home aide . She had a fall in June having right femur fracture needing [facility] at [facility] going to nursing home for rehabilitation . She has has been nonambulatory since then able to bear some weight on transfer at other times beiing full transfer  . 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 Significant medications depakote 1250 mg po bid , carbatrol 800 mg po bid . Testing Head CT cerebral atrophy , February 2015.  Cervical spine xray with degenerative changes , May 2019 Tegretol level 7.4 , Depakote level 39 , December 2020     Past Medical History:   Diagnosis Date    Anxiety     Arthritis     Cataracts, bilateral     Essential (primary) hypertension 3/6/2019    Fracture (healed) treatment follow-up 2015    hand fx post fall    Mental retardation     Movement disorder     Primary osteoarthritis involving multiple joints 2/17/2017    Rhe[facility] ([facility] [address]     Seizures ([facility] [address]        Past Surgical History:   Procedure Laterality Date    FEMUR FRACTURE SURGERY Right 7/29/2021    RIGHT FEMUR OPEN REDUCTION INTERNAL FIXATION performed by José Miguel Fields MD at [address] [place] [address] Right 11/20/14    LEG SURGERY Right 07/18/2018    RIGHT TIBIA IM MARÍA -KATE KNEE TRIANGLES, INTRAMEDULLARY REAMERS, SYNTHES TIBIAL NAIL    DC TREAT TIBIAL SHAFT FX, INTRAMED IMPLANT Right 7/18/2018    RIGHT TIBIA IM MARÍA -KATE KNEE TRIANGLES, INTRAMEDULLARY REAMERS, SYNTHES TIBIAL NAIL performed by Jade Ha DO at [address] History   Family history unknown: Yes       Social History     Socioeconomic History    Marital status: Single     Spouse name: None    Number of children: None    Years of education: None    Highest education level: None   Occupational History    None   Tobacco Use    Smoking status: Never Smoker    Smokeless tobacco: Never Used   Vaping Use    Vaping Use: Never used   Substance and Sexual Activity    Alcohol use: No     Alcohol/week: 0.0 standard drinks    Drug use: No    Sexual activity: None   Other Topics Concern    None   Social History Narrative    None     Social Determinants of Health     Financial Resource Strain: Low Risk     Difficulty of Paying Living Expenses: Not hard at all   Food Insecurity: No Food Insecurity    Worried About Running Out of Food in the Last Year: Never true    Abraham of Food in the Last Year: Never true   Transportation Needs:     Lack of Transportation (Medical): Not on file    Lack of Transportation (Non-Medical):  Not on file   Physical Activity:     Days of Exercise per Week: Not on file    Minutes of Exercise per Session: Not on file   Stress:     Feeling of Stress : Not on file   Social Connections:     Frequency of Communication with Friends and Family: Not on file    Frequency of Social Gatherings with Friends and Family: Not on file    Attends Yarsanism Services: Not on file    Active Member of Clubs or Organizations: Not on file    Attends Club or Organization Meetings: Not on file    Marital Status: Not on file   Intimate Partner Violence:     Fear of Current or Ex-Partner: Not on file    Emotionally Abused: Not on file    Physically Abused: Not on file    Sexually Abused: Not on file   Housing Stability:     Unable to Pay for Housing in the Last Year: Not on file    Number of Places Lived in the Last Year: Not on file    Unstable Housing in the Last Year: Not on file       Current Outpatient Medications   Medication Sig Dispense Refill    carBAMazepine (CARBATROL) 200 MG extended release capsule Take 4 capsules by mouth 2 times daily 240 capsule 3    bumetanide (BUMEX) 1 MG tablet Take 1 tablet by mouth daily 30 tablet 5    divalproex (DEPAKOTE) 500 MG DR tablet TAKE 2 TABLETS BY MOUTH TWICE A  tablet 5    divalproex (DEPAKOTE) 250 MG DR tablet TAKE 1 TABLET BY MOUTH TWICE A DAY 60 tablet 5    zinc oxide (DESITIN) 40 % ointment Apply topically 2 times as needed to blistered and dry areas 113 g 2    enoxaparin (LOVENOX) 40 MG/0.4ML injection Inject into the skin 2 times daily      alendronate (FOSAMAX) 70 MG tablet Take 1 tablet by mouth every 7 days Indications: Sundays 25 tablet 0    atorvastatin (LIPITOR) 40 MG tablet Take 1 tablet by mouth nightly 30 tablet 3    naproxen (NAPROSYN) 500 MG tablet TAKE 1 TABLET BY MOUTH TWICE A DAY WITH MEALS 60 tablet 5    STOOL SOFTENER 100 MG capsule TAKE 1 CAPSULE BY MOUTH TWICE A DAY AS NEEDED FOR CONSTIPATION 28 capsule 3     No current facility-administered medications for this visit. No Known Allergies    Review of Systems   Constitutional: Negative. HENT: Negative. Eyes: Negative. Respiratory: Negative. Cardiovascular: Positive for leg swelling. Gastrointestinal: Negative. Endocrine: Negative. Genitourinary: Negative. Musculoskeletal: Positive for gait problem and myalgias. Skin: Negative. Allergic/Immunologic: Negative. Psychiatric/Behavioral: Negative. Objective:   Physical Exam  Vitals:    11/16/21 0925   BP: (!) 141/100   Pulse: 70          Neurological Examination in wheelchair  Constitutional .General exam well groomed   Head/Ears /Nose/Throat: external ear . Normal exam  Neck and thyroid . Normal size. No bruits  Respiratory . Breathsounds clear bilaterally  Cardiovascular: Auscultation of heart with regular rate and rhythm  Musculoskeletal. Muscle bulk and tone normal                                                           Muscle strength legs 4-/5 bilaterally . Upper extremity 5/5 strength                                                                              No dysmetria or dysdiadokinesis  No tremor   Normal fine motor  Orientation Alert and oriented x 0  Attention and concentration reduced  Short term memory 0 words out of 3 in one minute   Language process normal . Dysarthria  Cranial nerve 2 normal acuety and visual fields  Cranial nerve 3, 4 and 6 . Extraocular muscles are intact . Pupils are equal and reactive   Cranial nerve 5 . Intact corneal reflex. Normal facial sensation  Cranial nerve 7 normal exam   Cranial nerve 8. Grossly intact hearing   Cranial nerve 9 and 10. Symmetric palate elevation   Cranial nerve 11 , 5 out of 5 strength   Cranial Nerve 12 midline tongue . No atrophy  Sensation . Normal proprioception . Intact Vibration . Normal pinprick and light touch   Deep Tendon Reflexes brisk reflexes  Plantar response equivocal bilaterally    Assessment:       Diagnosis Orders   1. Seizure disorder  ALT    AST    Carbamazepine Level, Total    Valproic acid level, total    CBC With Auto Differential   2. Developmental disorder     3. Gait disturbance     She is to continue current medication regimen to undergo drug levels       Plan:      Orders Placed This Encounter   Procedures    ALT     Standing Status:   Future     Standing Expiration Date:   11/16/2022    AST     Standing Status:   Future     Standing Expiration Date:   11/16/2022    Carbamazepine Level, Total     Standing Status:   Future     Standing Expiration Date:   11/16/2022     Order Specific Question:   Dose Schedule & Time of Last Dose?      Answer:   night    Valproic acid level, total     Standing Status:   Future     Standing Expiration Date:   11/16/2022     Order Specific Question:   Dose Schedule & Time of Last Dose?      Answer:   night    CBC With Auto Differential     Standing Status:   Future     Standing Expiration Date:   11/16/2022

## 2021-12-03 NOTE — TELEPHONE ENCOUNTER
Nhan Ford from [facility] called and said patients wound on leg is almost healed but she still sees puss coming out of it. She is not sure if you want to send another round of antibiotics, or get blood work done, or have her be seen in office. She is just worried because it is Friday.  Contact Nhan Ford @ [phone] when we get your answer

## 2021-12-07 NOTE — PROGRESS NOTES
[facility]  [facility]  [address] [place] [address]  Dept: [phone]  Dept Fax: [phone]     PAIN PROGRESS NOTE  Date of patient's visit: 12/7/2021  Patient's Name:  Cherrie Griffith YOB: 1945            Patient Care Team:  Xander Dawson DO as PCP - General (Family Medicine)  Xander Dawson DO as PCP - [facility]  Brett Lyn DPM as Physician (Podiatry)      Chief Complaint   Patient presents with    Nail Problem       Subjective: This Cherrie Griffith comes to clinic for foot and nail care. Pt currently has complaint of thickened, painful, elongated nails that he/she cannot manage by themselves. Pt. Relates pain to nails with shoe gear. Pt's primary care physician is Xander Dawson DO last seen 10/07/2021. Pt has a new complaint of none today . her nail that was ripped off is healed.    Past Medical History:   Diagnosis Date    Anxiety     Arthritis     Cataracts, bilateral     Essential (primary) hypertension 3/6/2019    Fracture (healed) treatment follow-up 2015    hand fx post fall    Mental retardation     Movement disorder     Primary osteoarthritis involving multiple joints 2/17/2017    Rheumatoid arthritis ([facility] [address]     Seizures (HCC)        No Known Allergies  Current Outpatient Medications on File Prior to Visit   Medication Sig Dispense Refill    sulfamethoxazole-trimethoprim (BACTRIM DS;SEPTRA DS) 800-160 MG per tablet Take 1 tablet by mouth 2 times daily for 7 days 14 tablet 0    carBAMazepine (CARBATROL) 200 MG extended release capsule Take 4 capsules by mouth 2 times daily 240 capsule 3    bumetanide (BUMEX) 1 MG tablet Take 1 tablet by mouth daily 30 tablet 5    divalproex (DEPAKOTE) 500 MG DR tablet TAKE 2 TABLETS BY MOUTH TWICE A  tablet 5    divalproex (DEPAKOTE) 250 MG DR tablet TAKE 1 TABLET BY MOUTH TWICE A DAY 60 tablet 5    zinc oxide (DESITIN) 40 % ointment Apply topically 2 times as needed to blistered and dry areas 113 g 2    enoxaparin (LOVENOX) 40 MG/0.4ML injection Inject into the skin 2 times daily       alendronate (FOSAMAX) 70 MG tablet Take 1 tablet by mouth every 7 days Indications: Sundays 25 tablet 0    atorvastatin (LIPITOR) 40 MG tablet Take 1 tablet by mouth nightly 30 tablet 3    STOOL SOFTENER 100 MG capsule TAKE 1 CAPSULE BY MOUTH TWICE A DAY AS NEEDED FOR CONSTIPATION 28 capsule 3    naproxen (NAPROSYN) 500 MG tablet TAKE 1 TABLET BY MOUTH TWICE A DAY WITH MEALS 60 tablet 5     No current facility-administered medications on file prior to visit. Review of Systems. Review of Systems:   History obtained from chart review and the patient  General ROS: negative for - chills, fatigue, fever, night sweats or weight gain  Constitutional: Negative for chills, diaphoresis, fatigue, fever and unexpected weight change. Musculoskeletal: Positive for arthralgias, gait problem and joint swelling. Neurological ROS: negative for - behavioral changes, confusion, headaches or seizures. Negative for weakness and numbness. Dermatological ROS: negative for - mole changes, rash  Cardiovascular: Negative for leg swelling. Gastrointestinal: Negative for constipation, diarrhea, nausea and vomiting. Objective:  Dermatologic Exam:  Skin lesion/ulceration Absent . Skin No rashes or nodules noted. .   Skin is thin, with flaky sloughing skin as well as decreased hair growth to the lower leg  Small red hemosiderin deposits seen dorsal foot   Musculoskeletal:     1st MPJ ROM decreased, Bilateral.  Muscle strength 5/5, Bilateral.  Pain present upon palpation of toenails 1-5, Bilateral. decreased medial longitudinal arch, Bilateral.  Ankle ROM decreased,Bilateral.    Dorsally contracted digits present digits 2, Bilateral.     Vascular: DP pulses 1/4 bilateral.  PT pulses 0/4 bilateral.   CFT <5 seconds, Bilateral.  Hair growth absent to the level of the digits, Bilateral.  Edema present, Bilateral.  Varicosities absent, Bilateral. Erythema absent, Bilateral    Neurological: Sensation diminshed to light touch to level of digits, Bilateral.  Protective sensation intact 6/10 sites via 5.07/10g Chester-Johanna Monofilament, Bilateral.  negative Tinel's, Bilateral.  negative Valleix sign, Bilateral.      Integument: Warm, dry, supple, Bilateral.  Open lesion absent, Bilateral.  Interdigital maceration absent to web spaces 4, Bilateral.  Nails 1-5 left and 1-5 right thickened > 3.0 mm, dystrophic and crumbly, discolored with subungual debris. Fissures absent, Bilateral.   General: AAO x 3 in NAD. Derm  Toenail Description  Sites of Onychomycosis Involvement (Check affected area)  [x] [x] [x] [x] [x] [x] [x] [x] [x] [x]  5 4 3 2 1 1 2 3 4 5                          Right                                        Left    Thickness  [x] [x] [x] [x] [x] [x] [x] [x] [x] [x]  5 4 3 2 1 1 2 3 4 5                         Right                                        Left    Dystrophic Changes   [x] [x] [x] [x] [x] [x] [x] [x] [x] [x]  5 4 3 2 1 1 2 3 4 5                         Right                                        Left    Color  [x] [x] [x] [x] [x] [x] [x] [x] [x] [x]  5 4 3 2 1 1 2 3 4 5                          Right                                        Left    Incurvation/Ingrowin   [] [] [] [] [] [] [] [] [] []  5 4 3 2 1 1 2 3 4 5                         Right                                        Left    Inflammation/Pain   [x] [x] [x] [x] [x] [x] [x] [x] [x] [x]  5 4 3  2 1 1 2 3 4 5                         Right                                        Left       Nails are painful 1-10 with direct palpation. Q7   []Yes  []No                Q8   [x]Yes  []No                     Q9   []Yes    []No  Assessment:  77 y.o. female with:    Diagnosis Orders   1. Dermatophytosis of nail  NV DEBRIDEMENT OF NAILS, 6 OR MORE   2.  Ingrowing nail  NV DEBRIDEMENT OF NAILS, 6 OR MORE   3. Left foot pain  AR DEBRIDEMENT OF NAILS, 6 OR MORE   4. Peripheral vascular disorder (HCC)  AR DEBRIDEMENT OF NAILS, 6 OR MORE         Plan:   Pt was evaluated and examined. Patient was given personalized discharge instructions. Nails 1-10 were debrided in length and thickness sharply with a nail nipper and  without incident. Pt will follow up in 9 weeks or sooner if any problems arise. Diagnosis was discussed with the pt and all of their questions were answered in detail. Proper foot hygiene and care was discussed with the pt. Patient to check feet daily and contact the office with any questions/problems/concerns. Other comorbidity noted and will be managed by PCP. Pain waiver discussed with patient and confirmed.    12/7/2021      Electronically signed by Miguelina Pierce DPM on 12/7/2021 at 9:03 AM  12/7/2021

## 2021-12-14 NOTE — TELEPHONE ENCOUNTER
Pt was started on antibiotic due to having an underlying infection with wound. The wound is now healed. The would like to know if you would like to do lab work to see if the infection is gone. Madeleine Wheeler was assigned to pt for woundcare she states she would like to stay on case atleast once a week to make sure wound and infection are ok, will you follow?

## 2021-12-15 NOTE — TELEPHONE ENCOUNTER
Yes I'm fine with her checking on [facility] once per week.  I don't think any labwork would be beneficial at this point so just continue to monitor the area for reoccurence of the rash

## 2021-12-20 NOTE — TELEPHONE ENCOUNTER
Lianna Ovalles called from patient's group home. Patient wants a new referral to Dr. Alejandro Trinidad - after the one was placed in July patient was sent to a facility and didn't get to make an appt. Now she needs a new referral placed. Concern with Memory loss - having a hard time with things that happened in the past and confusion on current daily events. Can she be evaluated for this?         Lianna Ovalles - [phone]

## 2021-12-28 NOTE — TELEPHONE ENCOUNTER
Pt had an infection underneath an open wound. Can they get an order to draw labs to make sure there is not underlying infection?

## 2022-01-01 ENCOUNTER — HOSPITAL ENCOUNTER (EMERGENCY)
Age: 77
Discharge: HOME | End: 2022-06-14
Attending: EMERGENCY MEDICINE
Payer: MEDICAID

## 2022-01-01 ENCOUNTER — TELEPHONE (OUTPATIENT)
Dept: FAMILY MEDICINE CLINIC | Age: 77
End: 2022-01-01

## 2022-01-01 ENCOUNTER — OFFICE VISIT (OUTPATIENT)
Dept: FAMILY MEDICINE CLINIC | Age: 77
End: 2022-01-01
Payer: MEDICAID

## 2022-01-01 ENCOUNTER — OFFICE VISIT (OUTPATIENT)
Dept: PODIATRY | Age: 77
End: 2022-01-01
Payer: MEDICAID

## 2022-01-01 ENCOUNTER — HOSPITAL ENCOUNTER (OUTPATIENT)
Age: 77
Discharge: HOME | End: 2022-06-24
Payer: MEDICAID

## 2022-01-01 ENCOUNTER — HOSPITAL ENCOUNTER (OUTPATIENT)
Dept: CT IMAGING | Age: 77
Discharge: HOME | End: 2022-06-26
Payer: MEDICAID

## 2022-01-01 ENCOUNTER — HOSPITAL ENCOUNTER (OUTPATIENT)
Dept: VASCULAR LAB | Age: 77
Discharge: HOME | End: 2022-03-11
Payer: MEDICAID

## 2022-01-01 ENCOUNTER — APPOINTMENT (OUTPATIENT)
Dept: GENERAL RADIOLOGY | Age: 77
End: 2022-01-01
Payer: MEDICAID

## 2022-01-01 ENCOUNTER — TELEPHONE (OUTPATIENT)
Dept: NEUROLOGY | Age: 77
End: 2022-01-01

## 2022-01-01 ENCOUNTER — HOSPITAL ENCOUNTER (OUTPATIENT)
Age: 77
Setting detail: SPECIMEN
Discharge: HOME | End: 2022-04-29

## 2022-01-01 VITALS
WEIGHT: 150 LBS | TEMPERATURE: 96.8 F | SYSTOLIC BLOOD PRESSURE: 120 MMHG | HEART RATE: 87 BPM | DIASTOLIC BLOOD PRESSURE: 60 MMHG | HEIGHT: 60 IN | RESPIRATION RATE: 16 BRPM | OXYGEN SATURATION: 96 % | BODY MASS INDEX: 29.45 KG/M2

## 2022-01-01 VITALS — SYSTOLIC BLOOD PRESSURE: 122 MMHG | OXYGEN SATURATION: 98 % | DIASTOLIC BLOOD PRESSURE: 64 MMHG | HEART RATE: 94 BPM

## 2022-01-01 VITALS — RESPIRATION RATE: 16 BRPM | WEIGHT: 150 LBS | HEIGHT: 60 IN | BODY MASS INDEX: 29.45 KG/M2

## 2022-01-01 VITALS
TEMPERATURE: 97.5 F | RESPIRATION RATE: 16 BRPM | WEIGHT: 135 LBS | HEART RATE: 74 BPM | BODY MASS INDEX: 26.5 KG/M2 | HEIGHT: 60 IN | DIASTOLIC BLOOD PRESSURE: 100 MMHG | OXYGEN SATURATION: 98 % | SYSTOLIC BLOOD PRESSURE: 142 MMHG

## 2022-01-01 VITALS — TEMPERATURE: 97.4 F

## 2022-01-01 DIAGNOSIS — M80.00XD AGE-RELATED OSTEOPOROSIS WITH CURRENT PATHOLOGICAL FRACTURE WITH ROUTINE HEALING: ICD-10-CM

## 2022-01-01 DIAGNOSIS — R41.3 MEMORY LOSS: ICD-10-CM

## 2022-01-01 DIAGNOSIS — F79 INTELLECTUAL DISABILITY: Chronic | ICD-10-CM

## 2022-01-01 DIAGNOSIS — I73.9 PERIPHERAL VASCULAR DISORDER (HCC): ICD-10-CM

## 2022-01-01 DIAGNOSIS — G40.909 SEIZURE DISORDER (HCC): Chronic | ICD-10-CM

## 2022-01-01 DIAGNOSIS — M15.9 PRIMARY OSTEOARTHRITIS INVOLVING MULTIPLE JOINTS: ICD-10-CM

## 2022-01-01 DIAGNOSIS — R60.0 EDEMA OF RIGHT LOWER LEG: ICD-10-CM

## 2022-01-01 DIAGNOSIS — F03.91 DEMENTIA WITH BEHAVIORAL DISTURBANCE, UNSPECIFIED DEMENTIA TYPE: Primary | ICD-10-CM

## 2022-01-01 DIAGNOSIS — I10 ESSENTIAL (PRIMARY) HYPERTENSION: Primary | ICD-10-CM

## 2022-01-01 DIAGNOSIS — G40.909 SEIZURE DISORDER (HCC): ICD-10-CM

## 2022-01-01 DIAGNOSIS — R23.8: ICD-10-CM

## 2022-01-01 DIAGNOSIS — I10 ESSENTIAL (PRIMARY) HYPERTENSION: ICD-10-CM

## 2022-01-01 DIAGNOSIS — B35.1 DERMATOPHYTOSIS OF NAIL: Primary | ICD-10-CM

## 2022-01-01 DIAGNOSIS — S72.491D CLOSED COMMINUTED INTRA-ARTICULAR FRACTURE OF DISTAL END OF RIGHT FEMUR WITH ROUTINE HEALING, SUBSEQUENT ENCOUNTER: ICD-10-CM

## 2022-01-01 DIAGNOSIS — Z99.3 DEPENDENCE ON WHEELCHAIR: ICD-10-CM

## 2022-01-01 DIAGNOSIS — R11.2 NON-INTRACTABLE VOMITING WITH NAUSEA, UNSPECIFIED VOMITING TYPE: Primary | ICD-10-CM

## 2022-01-01 DIAGNOSIS — S81.801D WOUND OF RIGHT LOWER EXTREMITY, SUBSEQUENT ENCOUNTER: ICD-10-CM

## 2022-01-01 DIAGNOSIS — G40.909 SEIZURE DISORDER (HCC): Primary | ICD-10-CM

## 2022-01-01 DIAGNOSIS — Z91.81 AT HIGH RISK FOR FALLS: ICD-10-CM

## 2022-01-01 DIAGNOSIS — R60.0 EDEMA OF LEFT LOWER LEG: ICD-10-CM

## 2022-01-01 DIAGNOSIS — R41.82 ALTERED MENTAL STATUS, UNSPECIFIED ALTERED MENTAL STATUS TYPE: ICD-10-CM

## 2022-01-01 DIAGNOSIS — R13.10 DYSPHAGIA, UNSPECIFIED TYPE: Primary | ICD-10-CM

## 2022-01-01 DIAGNOSIS — S31.809A WOUND OF BUTTOCK, UNSPECIFIED LATERALITY, INITIAL ENCOUNTER: Primary | ICD-10-CM

## 2022-01-01 DIAGNOSIS — S81.801D WOUND OF RIGHT LOWER EXTREMITY, SUBSEQUENT ENCOUNTER: Primary | ICD-10-CM

## 2022-01-01 DIAGNOSIS — E87.6 HYPOKALEMIA: Primary | ICD-10-CM

## 2022-01-01 DIAGNOSIS — R26.2 DIFFICULTY WALKING: ICD-10-CM

## 2022-01-01 DIAGNOSIS — I73.9 INTERMITTENT CLAUDICATION (HCC): Primary | ICD-10-CM

## 2022-01-01 DIAGNOSIS — R41.3 MEMORY LOSS: Primary | ICD-10-CM

## 2022-01-01 DIAGNOSIS — Z51.81 MEDICATION MONITORING ENCOUNTER: ICD-10-CM

## 2022-01-01 DIAGNOSIS — Z12.31 BREAST CANCER SCREENING BY MAMMOGRAM: ICD-10-CM

## 2022-01-01 DIAGNOSIS — R05.9 COUGH: ICD-10-CM

## 2022-01-01 DIAGNOSIS — F41.9 ANXIETY: Chronic | ICD-10-CM

## 2022-01-01 DIAGNOSIS — I73.9 INTERMITTENT CLAUDICATION (HCC): ICD-10-CM

## 2022-01-01 DIAGNOSIS — R32 URINARY INCONTINENCE, UNSPECIFIED TYPE: ICD-10-CM

## 2022-01-01 DIAGNOSIS — M80.00XD AGE-RELATED OSTEOPOROSIS WITH CURRENT PATHOLOGICAL FRACTURE WITH ROUTINE HEALING: Primary | ICD-10-CM

## 2022-01-01 LAB
% FREE CARBAMAZEPINE: 28 % (ref 8–35)
ABSOLUTE EOS #: 0.06 K/UL (ref 0–0.44)
ABSOLUTE EOS #: <0.03 K/UL (ref 0–0.44)
ABSOLUTE IMMATURE GRANULOCYTE: 0.03 K/UL (ref 0–0.3)
ABSOLUTE IMMATURE GRANULOCYTE: 0.09 K/UL (ref 0–0.3)
ABSOLUTE LYMPH #: 0.77 K/UL (ref 1.1–3.7)
ABSOLUTE LYMPH #: 1.2 K/UL (ref 1.1–3.7)
ABSOLUTE MONO #: 0.39 K/UL (ref 0.1–1.2)
ABSOLUTE MONO #: 0.57 K/UL (ref 0.1–1.2)
ALBUMIN SERPL-MCNC: 2.8 G/DL (ref 3.5–5.2)
ALBUMIN SERPL-MCNC: 3.9 G/DL (ref 3.5–5.2)
ALBUMIN/GLOBULIN RATIO: 1.6 (ref 1–2.5)
ALP BLD-CCNC: 105 U/L (ref 35–104)
ALP BLD-CCNC: 171 U/L (ref 35–104)
ALT SERPL-CCNC: 15 U/L (ref 5–33)
ALT SERPL-CCNC: 28 U/L (ref 5–33)
ALT SERPL-CCNC: 7 U/L (ref 5–33)
AMMONIA: 35 UMOL/L (ref 11–51)
ANION GAP SERPL CALCULATED.3IONS-SCNC: 11 MMOL/L (ref 9–17)
ANION GAP SERPL CALCULATED.3IONS-SCNC: 13 MMOL/L (ref 9–17)
AST SERPL-CCNC: 15 U/L
AST SERPL-CCNC: 55 U/L
BASOPHILS # BLD: 1 % (ref 0–2)
BASOPHILS # BLD: 1 % (ref 0–2)
BASOPHILS ABSOLUTE: 0 /ΜL
BASOPHILS ABSOLUTE: 0.03 K/UL (ref 0–0.2)
BASOPHILS ABSOLUTE: 0.05 K/UL (ref 0–0.2)
BASOPHILS RELATIVE PERCENT: 1 %
BILIRUB SERPL-MCNC: 0.26 MG/DL (ref 0.3–1.2)
BILIRUB SERPL-MCNC: 0.36 MG/DL (ref 0.3–1.2)
BUN BLDV-MCNC: 25 MG/DL (ref 8–23)
BUN BLDV-MCNC: 31 MG/DL (ref 8–23)
BUN/CREAT BLD: 43 (ref 9–20)
CALCIUM SERPL-MCNC: 8.5 MG/DL (ref 8.6–10.4)
CALCIUM SERPL-MCNC: 9.3 MG/DL (ref 8.6–10.4)
CARBAMAZEPINE LEVEL: 6.8 UG/ML (ref 4–12)
CARBAMAZEPINE LEVEL: 7 UG/ML (ref 4–12)
CARBAMAZEPINE, FREE: 2.3 UG/ML (ref 1–3)
CARBAMAZEPINE, TOTAL: 8.2 UG/ML (ref 4–12)
CHLORIDE BLD-SCNC: 104 MMOL/L (ref 98–107)
CHLORIDE BLD-SCNC: 107 MMOL/L (ref 98–107)
CO2: 21 MMOL/L (ref 20–31)
CO2: 27 MMOL/L (ref 20–31)
CREAT SERPL-MCNC: 0.53 MG/DL (ref 0.5–0.9)
CREAT SERPL-MCNC: 0.72 MG/DL (ref 0.5–0.9)
EOSINOPHILS ABSOLUTE: ABNORMAL
EOSINOPHILS RELATIVE PERCENT: 1 % (ref 1–4)
EOSINOPHILS RELATIVE PERCENT: 1 % (ref 1–4)
EOSINOPHILS RELATIVE PERCENT: ABNORMAL
GFR AFRICAN AMERICAN: >60 ML/MIN
GFR AFRICAN AMERICAN: >60 ML/MIN
GFR NON-AFRICAN AMERICAN: >60 ML/MIN
GFR NON-AFRICAN AMERICAN: >60 ML/MIN
GFR SERPL CREATININE-BSD FRML MDRD: ABNORMAL ML/MIN/{1.73_M2}
GFR SERPL CREATININE-BSD FRML MDRD: ABNORMAL ML/MIN/{1.73_M2}
GLUCOSE BLD-MCNC: 104 MG/DL (ref 70–99)
GLUCOSE BLD-MCNC: 66 MG/DL (ref 65–105)
GLUCOSE BLD-MCNC: 89 MG/DL (ref 70–99)
HCT VFR BLD CALC: 35.3 % (ref 36–46)
HCT VFR BLD CALC: 39.9 % (ref 36.3–47.1)
HCT VFR BLD CALC: 40.7 % (ref 36.3–47.1)
HCT VFR BLD CALC: 40.9 % (ref 36.3–47.1)
HEMOGLOBIN: 11.6 G/DL (ref 12–16)
HEMOGLOBIN: 12.3 G/DL (ref 11.9–15.1)
HEMOGLOBIN: 12.8 G/DL (ref 11.9–15.1)
HEMOGLOBIN: 12.8 G/DL (ref 11.9–15.1)
IMMATURE GRANULOCYTES: 1 %
IMMATURE GRANULOCYTES: 2 %
LIPASE: 12 U/L (ref 13–60)
LYMPHOCYTES # BLD: 18 % (ref 24–43)
LYMPHOCYTES # BLD: 28 % (ref 24–43)
LYMPHOCYTES ABSOLUTE: 0.9 /ΜL
LYMPHOCYTES RELATIVE PERCENT: 27.9 %
MCH RBC QN AUTO: 32.1 PG (ref 25.2–33.5)
MCH RBC QN AUTO: 32.2 PG (ref 25.2–33.5)
MCH RBC QN AUTO: 32.7 PG (ref 25.2–33.5)
MCH RBC QN AUTO: 32.8 PG
MCHC RBC AUTO-ENTMCNC: 30.8 G/DL (ref 28.4–34.8)
MCHC RBC AUTO-ENTMCNC: 31.3 G/DL (ref 28.4–34.8)
MCHC RBC AUTO-ENTMCNC: 31.4 G/DL (ref 28.4–34.8)
MCHC RBC AUTO-ENTMCNC: 32.9 G/DL
MCV RBC AUTO: 100 FL
MCV RBC AUTO: 103 FL (ref 82.6–102.9)
MCV RBC AUTO: 103.8 FL (ref 82.6–102.9)
MCV RBC AUTO: 104.2 FL (ref 82.6–102.9)
MONOCYTES # BLD: 13 % (ref 3–12)
MONOCYTES # BLD: 9 % (ref 3–12)
MONOCYTES ABSOLUTE: 0.3 /ΜL
MONOCYTES RELATIVE PERCENT: 8.7 %
NEUTROPHILS ABSOLUTE: 1.9 /ΜL
NEUTROPHILS RELATIVE PERCENT: 55.7 %
NRBC AUTOMATED: 0 PER 100 WBC
PDW BLD-RTO: 13.1 % (ref 11.8–14.4)
PDW BLD-RTO: 13.2 % (ref 11.8–14.4)
PDW BLD-RTO: 13.4 % (ref 11.8–14.4)
PDW BLD-RTO: 15 %
PLATELET # BLD: 125 K/UL (ref 138–453)
PLATELET # BLD: 139 K/UL (ref 138–453)
PLATELET # BLD: 197 K/ΜL
PLATELET # BLD: 268 K/UL (ref 138–453)
PMV BLD AUTO: 10.7 FL (ref 8.1–13.5)
PMV BLD AUTO: 11.8 FL (ref 8.1–13.5)
PMV BLD AUTO: 12.7 FL (ref 8.1–13.5)
PMV BLD AUTO: 9.3 FL
POTASSIUM SERPL-SCNC: 3.4 MMOL/L (ref 3.7–5.3)
POTASSIUM SERPL-SCNC: 3.8 MMOL/L (ref 3.7–5.3)
RBC # BLD: 3.54 10^6/ΜL
RBC # BLD: 3.83 M/UL (ref 3.95–5.11)
RBC # BLD: 3.92 M/UL (ref 3.95–5.11)
RBC # BLD: 3.97 M/UL (ref 3.95–5.11)
RBC # BLD: ABNORMAL 10*6/UL
RBC # BLD: ABNORMAL 10*6/UL
SEG NEUTROPHILS: 60 % (ref 36–65)
SEG NEUTROPHILS: 66 % (ref 36–65)
SEGMENTED NEUTROPHILS ABSOLUTE COUNT: 2.51 K/UL (ref 1.5–8.1)
SEGMENTED NEUTROPHILS ABSOLUTE COUNT: 2.91 K/UL (ref 1.5–8.1)
SODIUM BLD-SCNC: 138 MMOL/L (ref 135–144)
SODIUM BLD-SCNC: 145 MMOL/L (ref 135–144)
TOTAL PROTEIN: 6.1 G/DL (ref 6.4–8.3)
TOTAL PROTEIN: 6.4 G/DL (ref 6.4–8.3)
VALPROIC ACID % FREE: 24.1 % (ref 5–18.4)
VALPROIC ACID LEVEL: 68 UG/ML (ref 50–125)
VALPROIC ACID LEVEL: 69 UG/ML (ref 50–125)
VALPROIC ACID, FREE: 16.6 UG/ML (ref 7–23)
WBC # BLD: 3.2 10^3/ML
WBC # BLD: 4.2 K/UL (ref 3.5–11.3)
WBC # BLD: 4.4 K/UL (ref 3.5–11.3)
WBC # BLD: 8.6 K/UL (ref 3.5–11.3)

## 2022-01-01 PROCEDURE — 99214 OFFICE O/P EST MOD 30 MIN: CPT | Performed by: FAMILY MEDICINE

## 2022-01-01 PROCEDURE — 36415 COLL VENOUS BLD VENIPUNCTURE: CPT

## 2022-01-01 PROCEDURE — 80053 COMPREHEN METABOLIC PANEL: CPT

## 2022-01-01 PROCEDURE — 85027 COMPLETE CBC AUTOMATED: CPT

## 2022-01-01 PROCEDURE — 80164 ASSAY DIPROPYLACETIC ACD TOT: CPT

## 2022-01-01 PROCEDURE — 80156 ASSAY CARBAMAZEPINE TOTAL: CPT

## 2022-01-01 PROCEDURE — 99284 EMERGENCY DEPT VISIT MOD MDM: CPT

## 2022-01-01 PROCEDURE — 93924 LWR XTR VASC STDY BILAT: CPT

## 2022-01-01 PROCEDURE — 84460 ALANINE AMINO (ALT) (SGPT): CPT

## 2022-01-01 PROCEDURE — 70450 CT HEAD/BRAIN W/O DYE: CPT

## 2022-01-01 PROCEDURE — 83690 ASSAY OF LIPASE: CPT

## 2022-01-01 PROCEDURE — 80165 DIPROPYLACETIC ACID FREE: CPT

## 2022-01-01 PROCEDURE — 82140 ASSAY OF AMMONIA: CPT

## 2022-01-01 PROCEDURE — 85025 COMPLETE CBC W/AUTO DIFF WBC: CPT

## 2022-01-01 PROCEDURE — 71045 X-RAY EXAM CHEST 1 VIEW: CPT

## 2022-01-01 PROCEDURE — 11721 DEBRIDE NAIL 6 OR MORE: CPT | Performed by: PODIATRIST

## 2022-01-01 PROCEDURE — 80157 ASSAY CARBAMAZEPINE FREE: CPT

## 2022-01-01 PROCEDURE — 82947 ASSAY GLUCOSE BLOOD QUANT: CPT

## 2022-01-01 PROCEDURE — 1123F ACP DISCUSS/DSCN MKR DOCD: CPT | Performed by: FAMILY MEDICINE

## 2022-01-01 PROCEDURE — 99213 OFFICE O/P EST LOW 20 MIN: CPT | Performed by: PODIATRIST

## 2022-01-01 PROCEDURE — 93923 UPR/LXTR ART STDY 3+ LVLS: CPT

## 2022-01-01 RX ORDER — BUMETANIDE 1 MG/1
1 TABLET ORAL DAILY
Qty: 28 TABLET | Refills: 1 | Status: SHIPPED | OUTPATIENT
Start: 2022-01-01 | End: 2022-01-01

## 2022-01-01 RX ORDER — DIVALPROEX SODIUM 500 MG/1
TABLET, DELAYED RELEASE ORAL
Qty: 112 TABLET | Refills: 5 | Status: SHIPPED | OUTPATIENT
Start: 2022-01-01

## 2022-01-01 RX ORDER — ALENDRONATE SODIUM 70 MG/1
TABLET ORAL
Qty: 4 TABLET | Refills: 5 | Status: SHIPPED | OUTPATIENT
Start: 2022-01-01

## 2022-01-01 RX ORDER — MUPIROCIN CALCIUM 20 MG/G
CREAM TOPICAL
Qty: 30 G | Refills: 1 | Status: SHIPPED | OUTPATIENT
Start: 2022-01-01 | End: 2022-01-01

## 2022-01-01 RX ORDER — GUAIFENESIN 400 MG
TABLET ORAL
Qty: 200 EACH | Refills: 1 | Status: SHIPPED | OUTPATIENT
Start: 2022-01-01 | End: 2022-01-01

## 2022-01-01 RX ORDER — DIVALPROEX SODIUM 250 MG/1
TABLET, DELAYED RELEASE ORAL
Qty: 56 TABLET | Refills: 5 | Status: SHIPPED | OUTPATIENT
Start: 2022-01-01

## 2022-01-01 RX ORDER — CARBAMAZEPINE 200 MG/1
CAPSULE, EXTENDED RELEASE ORAL
Qty: 224 CAPSULE | Refills: 1 | Status: SHIPPED | OUTPATIENT
Start: 2022-01-01

## 2022-01-01 RX ORDER — ZINC OXIDE
OINTMENT (GRAM) TOPICAL
Qty: 113 G | Refills: 2 | Status: SHIPPED | OUTPATIENT
Start: 2022-01-01 | End: 2022-01-01

## 2022-01-01 RX ORDER — NAPROXEN 500 MG/1
TABLET ORAL
Qty: 56 TABLET | Refills: 1 | Status: SHIPPED | OUTPATIENT
Start: 2022-01-01

## 2022-01-01 RX ORDER — ZINC OXIDE
OINTMENT (GRAM) TOPICAL
Qty: 113 G | Refills: 2 | Status: SHIPPED | OUTPATIENT
Start: 2022-01-01

## 2022-01-01 RX ORDER — NAPROXEN 500 MG/1
TABLET ORAL
Qty: 60 TABLET | Refills: 5 | Status: SHIPPED | OUTPATIENT
Start: 2022-01-01 | End: 2022-01-01

## 2022-01-01 RX ORDER — BUMETANIDE 1 MG/1
1 TABLET ORAL DAILY
Qty: 28 TABLET | Refills: 1 | Status: SHIPPED | OUTPATIENT
Start: 2022-01-01

## 2022-01-01 RX ORDER — ATORVASTATIN CALCIUM 40 MG/1
TABLET, FILM COATED ORAL
Qty: 30 TABLET | Refills: 5 | Status: SHIPPED | OUTPATIENT
Start: 2022-01-01 | End: 2022-01-01

## 2022-01-01 RX ORDER — GUAIFENESIN 400 MG
TABLET ORAL
Qty: 200 EACH | Refills: 1 | Status: SHIPPED | OUTPATIENT
Start: 2022-01-01

## 2022-01-01 RX ORDER — ATORVASTATIN CALCIUM 40 MG/1
TABLET, FILM COATED ORAL
Qty: 28 TABLET | Refills: 1 | Status: SHIPPED | OUTPATIENT
Start: 2022-01-01

## 2022-01-01 RX ORDER — NAPROXEN 500 MG/1
TABLET ORAL
Qty: 56 TABLET | Refills: 0 | Status: SHIPPED | OUTPATIENT
Start: 2022-01-01 | End: 2022-01-01

## 2022-01-01 RX ORDER — CARBAMAZEPINE 200 MG/1
CAPSULE, EXTENDED RELEASE ORAL
Qty: 240 CAPSULE | Refills: 5 | Status: SHIPPED | OUTPATIENT
Start: 2022-01-01 | End: 2022-01-01

## 2022-01-01 ASSESSMENT — ENCOUNTER SYMPTOMS
GASTROINTESTINAL NEGATIVE: 1
SHORTNESS OF BREATH: 0
ABDOMINAL PAIN: 0
COUGH: 0
VISUAL CHANGE: 0
COUGH: 0
SHORTNESS OF BREATH: 0
VOMITING: 0
ORTHOPNEA: 0
SWOLLEN GLANDS: 0
VOMITING: 0
RESPIRATORY NEGATIVE: 1
ALLERGIC/IMMUNOLOGIC NEGATIVE: 1
COUGH: 0
NAUSEA: 0
VOMITING: 0
WHEEZING: 0
DIARRHEA: 0
EYES NEGATIVE: 1
ALLERGIC/IMMUNOLOGIC NEGATIVE: 1
ORTHOPNEA: 0
CHANGE IN BOWEL HABIT: 0
CHEST TIGHTNESS: 0
SORE THROAT: 0
NAUSEA: 0
EYES NEGATIVE: 1
BACK PAIN: 0
COUGH: 0
BLOOD IN STOOL: 0
ANAL BLEEDING: 0
VOMITING: 1
ABDOMINAL PAIN: 0
BLURRED VISION: 0
NAUSEA: 0
RESPIRATORY NEGATIVE: 1
BLURRED VISION: 0
SORE THROAT: 0
NAUSEA: 1
EYES NEGATIVE: 1
ORTHOPNEA: 0
ALLERGIC/IMMUNOLOGIC NEGATIVE: 1
SWOLLEN GLANDS: 0
CONSTIPATION: 0
ABDOMINAL DISTENTION: 0
SORE THROAT: 0
RESPIRATORY NEGATIVE: 1
ABDOMINAL PAIN: 0
ABDOMINAL PAIN: 0
VISUAL CHANGE: 0
GASTROINTESTINAL NEGATIVE: 1
BLURRED VISION: 0
GASTROINTESTINAL NEGATIVE: 1
CHANGE IN BOWEL HABIT: 0

## 2022-01-01 ASSESSMENT — PATIENT HEALTH QUESTIONNAIRE - PHQ9
SUM OF ALL RESPONSES TO PHQ9 QUESTIONS 1 & 2: 0
2. FEELING DOWN, DEPRESSED OR HOPELESS: 0
1. LITTLE INTEREST OR PLEASURE IN DOING THINGS: 0
SUM OF ALL RESPONSES TO PHQ QUESTIONS 1-9: 0

## 2022-01-12 PROBLEM — R32 URINARY INCONTINENCE: Status: ACTIVE | Noted: 2022-01-01

## 2022-01-12 PROBLEM — R41.3 MEMORY LOSS: Status: ACTIVE | Noted: 2022-01-01

## 2022-01-12 NOTE — PROGRESS NOTES
[facility] Progress Note    Date:1/12/2022         Patient Beryle Pereyra     YOB: 1945     Age:76 y.o. Assessment/Plan        Problem List Items Addressed This Visit        Circulatory    Essential (primary) hypertension      Well-controlled, continue current medications, continue current treatment plan, medication adherence emphasized and lifestyle modifications recommended            Nervous and Auditory    Seizure disorder (Chronic)      Monitored by specialist- no acute findings meriting change in the plan            Musculoskeletal and Integument    Age-related osteoporosis with current pathological fracture with routine healing      Stable on Fosamax         Closed comminuted intra-articular fracture of distal end of right femur (HCC)      Still complains of pain  PT/OT            Other    Mental retardation (Chronic)     Stable         Dependence on wheelchair      PT/OT         Difficulty walking      PT/OT         Urinary incontinence      Considering using the [employer] system         Memory loss - Primary      Worsening  Refer to neuro         Relevant Orders    Maye Robbins MD, Neurology, [facility]           Return in about 3 months (around 4/12/2022). Electronically signed by Jaylyn Stallworth DO on 1/12/22         Leonor     Jayne Sandifer is a 77 y.o. female presenting today for   Chief Complaint   Patient presents with    Results     had labs done    Other     patietn is not getting up at all is woundering about a ulternitive to sitting in wetness     Extremity Weakness     is requesting PT OT for transfer training    . Has caretaker helping with HPI and ROS    Jayne Sandifer is a 77 y.o. female who I am asked to see in consultation for evaluation osteoporosis. She was diagnosed with osteoporosis by bone density scan. Patient has history of fracture. The cause of osteoporosis is felt to be due to postmenopausal estrogen deficiency.  She is not currently being treated with calcium and vitamin D supplementation. She is currently being treated with bisphosphonates     Hasn't been getting out of her chair ever since her leg fracture. Really inactive. Fights them when they try to help transfer her to toilet and out of chair. Requests PT/OT    Urinary incontinence - wakes up with bed soaked - they'd like to try the [employer] system    Hypertension  This is a chronic problem. The current episode started more than 1 year ago. The problem has been resolved since onset. Associated symptoms include peripheral edema. Pertinent negatives include no anxiety, blurred vision, chest pain, headaches, malaise/fatigue, neck pain, orthopnea, palpitations, PND, shortness of breath or sweats. Past treatments include lifestyle changes. The current treatment provides significant improvement. Seizures  This is a chronic problem. The current episode started more than 1 year ago. The problem occurs intermittently. The problem has been unchanged. Pertinent negatives include no abdominal pain, anorexia, arthralgias, change in bowel habit, chest pain, chills, congestion, coughing, diaphoresis, fatigue, fever, headaches, joint swelling, myalgias, nausea, neck pain, numbness, rash, sore throat, swollen glands, urinary symptoms, vertigo, visual change, vomiting or weakness. Treatments tried: 2 meds - sees Dr. Caroline Henry neuro. Review of Systems   Review of Systems   Constitutional: Negative. Negative for chills, diaphoresis, fatigue, fever and malaise/fatigue. HENT: Negative. Negative for congestion and sore throat. Eyes: Negative. Negative for blurred vision. Respiratory: Negative. Negative for cough and shortness of breath. Cardiovascular: Negative. Negative for chest pain, palpitations, orthopnea and PND. Gastrointestinal: Negative. Negative for abdominal pain, anorexia, change in bowel habit, nausea and vomiting. Endocrine: Negative. Genitourinary: Negative. Musculoskeletal: Negative. Negative for arthralgias, joint swelling, myalgias and neck pain. Skin: Negative. Negative for rash. Allergic/Immunologic: Negative. Neurological: Positive for seizures. Negative for vertigo, weakness, numbness and headaches. Hematological: Negative. Psychiatric/Behavioral: Negative. All other systems reviewed and are negative. Medications     Current Outpatient Medications   Medication Sig Dispense Refill    carBAMazepine (CARBATROL) 200 MG extended release capsule Take 4 capsules by mouth 2 times daily 240 capsule 3    bumetanide (BUMEX) 1 MG tablet Take 1 tablet by mouth daily 30 tablet 5    divalproex (DEPAKOTE) 500 MG DR tablet TAKE 2 TABLETS BY MOUTH TWICE A  tablet 5    divalproex (DEPAKOTE) 250 MG DR tablet TAKE 1 TABLET BY MOUTH TWICE A DAY 60 tablet 5    zinc oxide (DESITIN) 40 % ointment Apply topically 2 times as needed to blistered and dry areas 113 g 2    alendronate (FOSAMAX) 70 MG tablet Take 1 tablet by mouth every 7 days Indications: Sundays 25 tablet 0    atorvastatin (LIPITOR) 40 MG tablet Take 1 tablet by mouth nightly 30 tablet 3    naproxen (NAPROSYN) 500 MG tablet TAKE 1 TABLET BY MOUTH TWICE A DAY WITH MEALS (Patient taking differently: 2 times daily as needed for Pain ) 60 tablet 5    STOOL SOFTENER 100 MG capsule TAKE 1 CAPSULE BY MOUTH TWICE A DAY AS NEEDED FOR CONSTIPATION 28 capsule 3     No current facility-administered medications for this visit. Past History    Past Medical History:   has a past medical history of Anxiety, Arthritis, Cataracts, bilateral, Essential (primary) hypertension, Fracture (healed) treatment follow-up, Mental retardation, Movement disorder, Primary osteoarthritis involving multiple joints, Rheumatoid arthritis ([facility] [address] Seizures ([facility] [address] and Urinary incontinence. Social History:   reports that she has never smoked.  She has never used smokeless tobacco. She reports that she does not drink flat.         Speech: Speech is delayed. Behavior: Behavior is slowed. Thought Content: Thought content normal.         Judgment: Judgment normal.         Labs/Imaging/Diagnostics   Labs:  No results found for: CMPWITHGFR, CBCAUTODIF, TSHFT4, LABA1C, LIPIDPAN    Imaging Last 24 Hours:  XR KNEE RIGHT (3 VIEWS)  3 views right knee. History: Patient has sustained a supracondylar fracture of the right femur   with comminution. Comparison: 7/29/2021. Findings: Compared with the previous x-ray in the lateral view there   appears to be some consolidation of the comminuted piece of bone. In the   AP view there does not seem to be any callus formation on the medial side. The alignment is still maintained. The hardware is intact. There is no   loosening of the screws. Impression: Stable fixation of the supracondylar fracture right hip with   possible healing posteriorly.

## 2022-01-12 NOTE — PATIENT INSTRUCTIONS
Patient Education        Preventing Falls: Care Instructions  Your Care Instructions     Getting around your home safely can be a challenge if you have injuries or health problems that make it easy for you to fall. Loose rugs and furniture in walkways are among the dangers for many older people who have problems walking or who have poor eyesight. People who have conditions such as arthritis, osteoporosis, or dementia also have to be careful not to fall. You can make your home safer with a few simple measures. Follow-up care is a key part of your treatment and safety. Be sure to make and go to all appointments, and call your doctor if you are having problems. It's also a good idea to know your test results and keep a list of the medicines you take. How can you care for yourself at home? Taking care of yourself  · You may get dizzy if you do not drink enough water. To prevent dehydration, drink plenty of fluids. Choose water and other clear liquids. If you have kidney, heart, or liver disease and have to limit fluids, talk with your doctor before you increase the amount of fluids you drink. · Exercise regularly to improve your strength, muscle tone, and balance. Walk if you can. Swimming may be a good choice if you cannot walk easily. · Have your vision and hearing checked each year or any time you notice a change. If you have trouble seeing and hearing, you might not be able to avoid objects and could lose your balance. · Know the side effects of the medicines you take. Ask your doctor or pharmacist whether the medicines you take can affect your balance. Sleeping pills or sedatives can affect your balance. · Limit the amount of alcohol you drink. Alcohol can impair your balance and other senses. · Ask your doctor whether calluses or corns on your feet need to be removed. If you wear loose-fitting shoes because of calluses or corns, you can lose your balance and fall.   · Talk to your doctor if you have numbness in your feet. Preventing falls at home  · Remove raised doorway thresholds, throw rugs, and clutter. Repair loose carpet or raised areas in the floor. · Move furniture and electrical cords to keep them out of walking paths. · Use nonskid floor wax, and wipe up spills right away, especially on ceramic tile floors. · If you use a walker or cane, put rubber tips on it. If you use crutches, clean the bottoms of them regularly with an abrasive pad, such as steel wool. · Keep your house well lit, especially [place], and outside walkways. Use night-lights in areas such as hallways and bathrooms. Add extra light switches or use remote switches (such as switches that go on or off when you clap your hands) to make it easier to turn lights on if you have to get up during the night. · Install sturdy handrails on stairways. · Move items in your cabinets so that the things you use a lot are on the lower shelves (about waist level). · Keep a cordless phone and a flashlight with new batteries by your bed. If possible, put a phone in each of the main rooms of your house, or carry a cell phone in case you fall and cannot reach a phone. Or, you can wear a device around your neck or wrist. You push a button that sends a signal for help. · Wear low-heeled shoes that fit well and give your feet good support. Use footwear with nonskid soles. Check the heels and soles of your shoes for wear. Repair or replace worn heels or soles. · Do not wear socks without shoes on wood floors. · Walk on the grass when the sidewalks are slippery. If you live in an area that gets snow and ice in the winter, sprinkle salt on slippery steps and sidewalks. Preventing falls in the bath  · Install grab bars and nonskid mats inside and outside your shower or tub and near the toilet and sinks. · Use shower chairs and bath benches. · Use a hand-held shower head that will allow you to sit while showering.   · Get into a tub or shower by putting the weaker leg in first. Get out of a tub or shower with your strong side first.  · Repair loose toilet seats and consider installing a raised toilet seat to make getting on and off the toilet easier. · Keep your bathroom door unlocked while you are in the shower. Where can you learn more? Go to https://DigePrintpepiceweb.Amedrix. org and sign in to your [employer] account. Enter [phone] [id number] in the [facility] box to learn more about \"Preventing Falls: Care Instructions. \"     If you do not have an account, please click on the \"Sign Up Now\" link. Current as of: September 8, 2021               Content Version: 13.1  © 2307-8825 Healthwise, Incorporated. Care instructions adapted under license by [facility] ([facility]). If you have questions about a medical condition or this instruction, always ask your healthcare professional. [place] [address] any warranty or liability for your use of this information.

## 2022-02-10 NOTE — PROGRESS NOTES
[address] [facility] PODIATRY [facility]  [address] [address] [place] [address]  Dept: [phone]  Dept Fax: [phone]     PAIN PROGRESS NOTE  Date of patient's visit: 2/10/2022  Patient's Name:  Jay Magaña YOB: 1945            Patient Care Team:  Dee Dee Salmon DO as PCP - General (Family Medicine)  Dee Dee Salmon DO as PCP - [facility] Empaneled Provider  Iker Young DPM as Physician (Podiatry)      Chief Complaint   Patient presents with    Nail Problem       Subjective: This Jay Magaña comes to clinic for foot and nail care. Pt currently has complaint of thickened, painful, elongated nails that he/she cannot manage by themselves. Pt. Relates pain to nails with shoe gear. Pt's primary care physician is Dee Dee Salmon DO last seen 01/12/2022. Pt has a new complaint of cramping when her legs are elevated. Pt has tried changing shoes but it has not helped the pain  Patient is taking over the counter medications with no relief.    Past Medical History:   Diagnosis Date    Anxiety     Arthritis     Cataracts, bilateral     Essential (primary) hypertension 3/6/2019    Fracture (healed) treatment follow-up 2015    hand fx post fall    Mental retardation     Movement disorder     Primary osteoarthritis involving multiple joints 2/17/2017    Rheumatoid arthritis ([facility] [address]     Seizures (HCC)     Urinary incontinence 1/12/2022       No Known Allergies  Current Outpatient Medications on File Prior to Visit   Medication Sig Dispense Refill    carBAMazepine (CARBATROL) 200 MG extended release capsule Take 4 capsules by mouth 2 times daily 240 capsule 3    bumetanide (BUMEX) 1 MG tablet Take 1 tablet by mouth daily 30 tablet 5    divalproex (DEPAKOTE) 500 MG DR tablet TAKE 2 TABLETS BY MOUTH TWICE A  tablet 5    divalproex (DEPAKOTE) 250 MG DR tablet TAKE 1 TABLET BY MOUTH TWICE A DAY 60 tablet 5    zinc oxide (DESITIN) 40 % ointment Apply topically 2 times as needed to blistered and dry areas 113 g 2    atorvastatin (LIPITOR) 40 MG tablet Take 1 tablet by mouth nightly 30 tablet 3    STOOL SOFTENER 100 MG capsule TAKE 1 CAPSULE BY MOUTH TWICE A DAY AS NEEDED FOR CONSTIPATION 28 capsule 3    alendronate (FOSAMAX) 70 MG tablet Take 1 tablet by mouth every 7 days Indications: Sundays 25 tablet 0    naproxen (NAPROSYN) 500 MG tablet TAKE 1 TABLET BY MOUTH TWICE A DAY WITH MEALS (Patient taking differently: 2 times daily as needed for Pain ) 60 tablet 5     No current facility-administered medications on file prior to visit. Review of Systems. Review of Systems:   History obtained from chart review and the patient  General ROS: negative for - chills, fatigue, fever, night sweats or weight gain  Constitutional: Negative for chills, diaphoresis, fatigue, fever and unexpected weight change. Musculoskeletal: Positive for arthralgias, gait problem and joint swelling. Neurological ROS: negative for - behavioral changes, confusion, headaches or seizures. Negative for weakness and numbness. Dermatological ROS: negative for - mole changes, rash  Cardiovascular: Negative for leg swelling. Gastrointestinal: Negative for constipation, diarrhea, nausea and vomiting. Objective:  Dermatologic Exam:  Skin lesion/ulceration Absent . Skin No rashes or nodules noted. .   Skin is thin, with flaky sloughing skin as well as decreased hair growth to the lower leg  Small red hemosiderin deposits seen dorsal foot   Musculoskeletal:     1st MPJ ROM decreased, Bilateral.  Muscle strength 5/5, Bilateral.  Pain present upon palpation of toenails 1-5, Bilateral. decreased medial longitudinal arch, Bilateral.  Ankle ROM decreased,Bilateral.    Dorsally contracted digits present digits 2, Bilateral.     Vascular: DP pulses 1/4 bilateral.  PT pulses 0/4 bilateral.   CFT <5 seconds, Bilateral.  Hair growth absent to the level of the digits, Bilateral.  Edema present, Bilateral.  Varicosities absent, Bilateral. Erythema absent, Bilateral    Neurological: Sensation diminshed to light touch to level of digits, Bilateral.  Protective sensation intact 6/10 sites via 5.07/10g [place]-Johanna Monofilament, Bilateral.  negative Tinel's, Bilateral.  negative Valleix sign, Bilateral.      Integument: Warm, dry, supple, Bilateral.  Open lesion absent, Bilateral.  Interdigital maceration absent to web spaces 4, Bilateral.  Nails 1-5 left and 1-5 right thickened > 3.0 mm, dystrophic and crumbly, discolored with subungual debris. Fissures absent, Bilateral.   General: AAO x 3 in NAD. Derm  Toenail Description  Sites of Onychomycosis Involvement (Check affected area)  [x] [x] [x] [x] [x] [x] [x] [x] [x] [x]  5 4 3 2 1 1 2 3 4 5                          Right                                        Left    Thickness  [x] [x] [x] [x] [x] [x] [x] [x] [x] [x]  5 4 3 2 1 1 2 3 4 5                         Right                                        Left    Dystrophic Changes   [x] [x] [x] [x] [x] [x] [x] [x] [x] [x]  5 4 3 2 1 1 2 3 4 5                         Right                                        Left    Color  [x] [x] [x] [x] [x] [x] [x] [x] [x] [x]  5 4 3 2 1 1 2 3 4 5                          Right                                        Left    Incurvation/Ingrowin   [] [] [] [] [] [] [] [] [] []  5 4 3 2 1 1 2 3 4 5                         Right                                        Left    Inflammation/Pain   [x] [x] [x] [x] [x] [x] [x] [x] [x] [x]  5 4 3  2 1 1 2 3 4 5                         Right                                        Left       Nails are painful 1-10 with direct palpation. Q7   []Yes  []No                Q8   [x]Yes  []No                     Q9   []Yes    []No  Assessment:  77 y.o. female with:    Diagnosis Orders   1. Dermatophytosis of nail  62587 - MS DEBRIDEMENT OF NAILS, 6 OR MORE   2.  Edema of left lower leg 65885 - IN DEBRIDEMENT OF NAILS, 6 OR MORE    VL DUP LOWER EXTREMITY ARTERIES BILATERAL   3. Edema of right lower leg  77652 - IN DEBRIDEMENT OF NAILS, 6 OR MORE    VL DUP LOWER EXTREMITY ARTERIES BILATERAL   4. Peripheral vascular disorder (HCC)  95452 - IN DEBRIDEMENT OF NAILS, 6 OR MORE    VL DUP LOWER EXTREMITY ARTERIES BILATERAL   5. Intermittent claudication ([facility] [address]  [address]77 - IN DEBRIDEMENT OF NAILS, 6 OR MORE    VL DUP LOWER EXTREMITY ARTERIES BILATERAL         Plan:   Pt was evaluated and examined. Patient was given personalized discharge instructions. For the new complaint of leg cramping I am ordering non invasive vasuclar studies the patients lower legs are extremely cold and discolored with cyanosis. The patient has decreased hair growth to the lower extremites with a delayed capillary refill time. This test is necessary to see if vascular surgery involvement is necessary and would relieve patient of their painful symptoms. Nails 1-10 were debrided in length and thickness sharply with a nail nipper and  without incident. Pt will follow up in 9 weeks or sooner if any problems arise. Diagnosis was discussed with the pt and all of their questions were answered in detail. Proper foot hygiene and care was discussed with the pt. Patient to check feet daily and contact the office with any questions/problems/concerns. Other comorbidity noted and will be managed by PCP. Pain waiver discussed with patient and confirmed. All labs were reviewed and all imagining including the above findings were reviewed PRIOR to the patients arrival and with the patient today. Previous patient encounter was reviewed. Encounters from the patients other medical providers were reviewed and noted. Time was spent educating the patient and their families/caregivers on proper care of the feet and ankles. All the above diagnosis were addressed at todays visit and all questions were answered.   A total of 20 minutes was spent with this patients encounter which included charting after the patients visit    2/10/2022      Electronically signed by Diego Sullivan DPM on 2/10/2022 at 9:24 AM  2/10/2022

## 2022-02-17 NOTE — TELEPHONE ENCOUNTER
[facility] [place] is calling to request a refill on the following medication(s):    Medication Request:  Requested Prescriptions     Pending Prescriptions Disp Refills    naproxen (NAPROSYN) 500 MG tablet [Pharmacy Med Name: Naproxen 500MG TABS] 56 tablet 0     Sig: TAKE 1 TABLET BY MOUTH TWICE A DAY WITH MEALS       Last Visit Date (If Applicable):  59/62/5539    Next Visit Date:    04/12/2022

## 2022-03-16 NOTE — TELEPHONE ENCOUNTER
[facility] is calling to request a refill on the following medication(s):    Medication Request:  Requested Prescriptions     Pending Prescriptions Disp Refills    atorvastatin (LIPITOR) 40 MG tablet [Pharmacy Med Name: Atorvastatin Calcium 40MG TABS] 30 tablet 5     Sig: TAKE 1 TABLET BY MOUTH DAILY    carBAMazepine (CARBATROL) 200 MG extended release capsule [Pharmacy Med Name: carBAMazepine ER 200MG CP12*] 240 capsule 5     Sig: TAKE 4 CAPSULES BY MOUTH TWICE A DAY    naproxen (NAPROSYN) 500 MG tablet [Pharmacy Med Name: Naproxen 500MG TABS] 60 tablet 5     Sig: TAKE 1 TABLET BY MOUTH TWICE A DAY WITH MEALS       Last Visit Date (If Applicable):  4/25/5578    Next Visit Date:    4/12/2022

## 2022-03-23 NOTE — TELEPHONE ENCOUNTER
An order for trioid cream, bandages, and and wound  for 3 wounds 2 on right upper thigh and 1 on her left upper thigh. They are all open and are about 1cm. The nurse would like to be changed twice a week and as needed. They just need an order put in for wound care.  Their fax number is [phone]

## 2022-03-29 NOTE — TELEPHONE ENCOUNTER
I'm not sure if everything is correct but I put in an order for home health wound care, bandages, Desitin, and Bactroban

## 2022-04-06 NOTE — TELEPHONE ENCOUNTER
Outreach: Spoke with Caregiver Deedra Goltz states client is non verbal. States to send FIT/FOBT to home and care givers will collect specimen and return it to our office.

## 2022-04-29 NOTE — PATIENT INSTRUCTIONS
Patient Education        Preventing Falls: Care Instructions  Your Care Instructions     Getting around your home safely can be a challenge if you have injuries or health problems that make it easy for you to fall. Loose rugs and furniture in walkways are among the dangers for many older people who have problems walking or who have poor eyesight. People who have conditions such as arthritis,osteoporosis, or dementia also have to be careful not to fall. You can make your home safer with a few simple measures. Follow-up care is a key part of your treatment and safety. Be sure to make and go to all appointments, and call your doctor if you are having problems. It's also a good idea to know your test results and keep alist of the medicines you take. How can you care for yourself at home? Taking care of yourself   Exercise regularly to improve your strength, muscle tone, and balance. Walk if you can. Swimming may be a good choice if you cannot walk easily.  Have your vision and hearing checked each year or any time you notice a change. If you have trouble seeing and hearing, you might not be able to avoid objects and could lose your balance.  Know the side effects of the medicines you take. Ask your doctor or pharmacist whether the medicines you take can affect your balance. Sleeping pills or sedatives can affect your balance.  Limit the amount of alcohol you drink. Alcohol can impair your balance and other senses.  Ask your doctor whether calluses or corns on your feet need to be removed. If you wear loose-fitting shoes because of calluses or corns, you can lose your balance and fall.  Talk to your doctor if you have numbness in your feet.  You may get dizzy if you do not drink enough water. To prevent dehydration, drink plenty of fluids. Choose water and other clear liquids.  If you have kidney, heart, or liver disease and have to limit fluids, talk with your doctor before you increase the amount of fluids you drink. Preventing falls at home   Remove raised doorway thresholds, throw rugs, and clutter. Repair loose carpet or raised areas in the floor. [facility] furniture and electrical cords to keep them out of walking paths.  Use nonskid floor wax, and wipe up spills right away, especially on ceramic tile floors.  If you use a walker or cane, put rubber tips on it. If you use crutches, clean the bottoms of them regularly with an abrasive pad, such as steel wool.  Keep your house well lit, especially [place], and outside walkways. Use night-lights in areas such as hallways and bathrooms. Add extra light switches or use remote switches (such as switches that go on or off when you clap your hands) to make it easier to turn lights on if you have to get up during the night.  Install sturdy handrails on stairways.  Move items in your cabinets so that the things you use a lot are on the lower shelves (about waist level).  Keep a cordless phone and a flashlight with new batteries by your bed. If possible, put a phone in each of the main rooms of your house, or carry a cell phone in case you fall and cannot reach a phone. Or, you can wear a device around your neck or wrist. You push a button that sends a signal for help.  Wear low-heeled shoes that fit well and give your feet good support. Use footwear with nonskid soles. Check the heels and soles of your shoes for wear. Repair or replace worn heels or soles.  Do not wear socks without shoes on wood floors.  Walk on the grass when the sidewalks are slippery. If you live in an area that gets snow and ice in the winter, sprinkle salt on slippery steps and sidewalks. Or ask a family member or friend to do this for you. Preventing falls in the bath   Install grab bars and nonskid mats inside and outside your shower or tub and near the toilet and sinks.  Use shower chairs and bath benches.    Use a hand-held shower head that will allow you to sit while showering.  Get into a tub or shower by putting the weaker leg in first. Get out of a tub or shower with your strong side first.   Repair loose toilet seats and consider installing a raised toilet seat to make getting on and off the toilet easier.  Keep your bathroom door unlocked while you are in the shower. Where can you learn more? Go to https://VIRIDAXISpePearl's Premiumeweb.Stayfilm. org and sign in to your [employer] account. Enter [phone] in the [facility] box to learn more about \"Preventing Falls: Care Instructions. \"     If you do not have an account, please click on the \"Sign Up Now\" link. Current as of: September 8, 2021               Content Version: 13.2  © 8014-6958 Healthwise, Incorporated. Care instructions adapted under license by [facility] ([facility]). If you have questions about a medical condition or this instruction, always ask your healthcare professional. [place] [address] any warranty or liability for your use of this information.

## 2022-04-29 NOTE — PROGRESS NOTES
AP[facility] Progress Note    Date:4/29/2022         Patient Dara Caldwell     YOB: 1945     Age:76 y.o. Assessment/Plan        Problem List Items Addressed This Visit        Circulatory    Essential (primary) hypertension - Primary      Well-controlled, continue current medications, continue current treatment plan, medication adherence emphasized and lifestyle modifications recommended         Relevant Orders    CBC with Auto Differential    Comprehensive Metabolic Panel       Nervous and Auditory    Seizure disorder (Chronic)      Monitored by specialist- no acute findings meriting change in the plan         Relevant Orders    Valproic Acid Level, Total    Carbamazepine Level, Total       Musculoskeletal and Integument    Age-related osteoporosis with current pathological fracture with routine healing      Asymptomatic, continue current medications and continue current treatment plan            Other    Mental retardation (Chronic)      Stable         Anxiety (Chronic)      Unclear control, changes made today: refer to psych         Relevant Orders    Charissa Arnett MD, Psychiatry, [facility] loss      Monitored by specialist- no acute findings meriting change in the plan         Relevant Orders    Charissa Arnett MD, Psychiatry, [place]           Return in about 3 months (around 7/29/2022). Electronically signed by Aida Ring DO on 4/29/22       Total time spent was between Time personally spent assessing and managing the patient on the date of service: Est: 30-39 minutes ([id number]) mins. This included time spent reviewing the patient's medical record (e.g., recent visits, labs, and studies); seeing the patient in the office (face-to-face time); ordering medications, studies, procedures, or referrals; calling the patient or family later in the day with results and further recommendations; and documenting the visit in the medical record. Leonor Rogers is a 77 y.o. female presenting today for   Chief Complaint   Patient presents with    Seizures    3 Month Follow-Up   . Has caretaker helping with HPI and ANNITA Byers is a 77 y.o. female who I am asked to see in consultation for evaluation osteoporosis. She was diagnosed with osteoporosis by bone density scan. Patient has history of fracture. The cause of osteoporosis is felt to be due to postmenopausal estrogen deficiency. She is not currently being treated with calcium and vitamin D supplementation. She is currently being treated with bisphosphonates     Hasn't been getting out of her chair ever since her leg fracture. Really inactive. Fights them when they try to help transfer her to toilet and out of chair. Requests PT/OT    Seizures  This is a chronic problem. The current episode started more than 1 year ago. The problem occurs intermittently. The problem has been unchanged. Pertinent negatives include no abdominal pain, anorexia, arthralgias, change in bowel habit, chest pain, chills, congestion, coughing, diaphoresis, fatigue, fever, headaches, joint swelling, myalgias, nausea, neck pain, numbness, rash, sore throat, swollen glands, urinary symptoms, vertigo, visual change, vomiting or weakness. Treatments tried: 2 meds - sees Dr. Deja Alexander neuro. Hypertension  This is a chronic problem. The current episode started more than 1 year ago. The problem has been resolved since onset. Associated symptoms include peripheral edema. Pertinent negatives include no anxiety, blurred vision, chest pain, headaches, malaise/fatigue, neck pain, orthopnea, palpitations, PND, shortness of breath or sweats. Past treatments include lifestyle changes. The current treatment provides significant improvement. Review of Systems   Review of Systems   Constitutional: Negative. Negative for chills, diaphoresis, fatigue, fever and malaise/fatigue. HENT: Negative. Negative for congestion and sore throat. Eyes: Negative. Negative for blurred vision. Respiratory: Negative. Negative for cough and shortness of breath. Cardiovascular: Negative. Negative for chest pain, palpitations, orthopnea and PND. Gastrointestinal: Negative. Negative for abdominal pain, anorexia, change in bowel habit, nausea and vomiting. Endocrine: Negative. Genitourinary: Negative. Musculoskeletal: Negative. Negative for arthralgias, joint swelling, myalgias and neck pain. Skin: Negative. Negative for rash. Allergic/Immunologic: Negative. Neurological: Positive for seizures. Negative for vertigo, weakness, numbness and headaches. Hematological: Negative. Psychiatric/Behavioral: Negative. All other systems reviewed and are negative. Medications     Current Outpatient Medications   Medication Sig Dispense Refill    Adhesive Bandages MISC Use as directed 200 each 1    zinc oxide (DESITIN) 40 % ointment Apply topically 2 times as needed to blistered and dry areas 113 g 2    atorvastatin (LIPITOR) 40 MG tablet TAKE 1 TABLET BY MOUTH DAILY 30 tablet 5    carBAMazepine (CARBATROL) 200 MG extended release capsule TAKE 4 CAPSULES BY MOUTH TWICE A  capsule 5    bumetanide (BUMEX) 1 MG tablet Take 1 tablet by mouth daily 30 tablet 5    divalproex (DEPAKOTE) 500 MG DR tablet TAKE 2 TABLETS BY MOUTH TWICE A  tablet 5    divalproex (DEPAKOTE) 250 MG DR tablet TAKE 1 TABLET BY MOUTH TWICE A DAY 60 tablet 5    STOOL SOFTENER 100 MG capsule TAKE 1 CAPSULE BY MOUTH TWICE A DAY AS NEEDED FOR CONSTIPATION 28 capsule 3    naproxen (NAPROSYN) 500 MG tablet TAKE 1 TABLET BY MOUTH TWICE A DAY WITH MEALS 60 tablet 5    alendronate (FOSAMAX) 70 MG tablet Take 1 tablet by mouth every 7 days Indications: Sundays 25 tablet 0     No current facility-administered medications for this visit.        Past History    Past Medical History:   has a past medical history of Anxiety, Arthritis, Cataracts, bilateral, Essential (primary) hypertension, Fracture (healed) treatment follow-up, Mental retardation, Movement disorder, Primary osteoarthritis involving multiple joints, Rheumatoid arthritis ([facility] [address] Seizures ([facility] [address] and Urinary incontinence. Social History:   reports that she has never smoked. She has never used smokeless tobacco. She reports that she does not drink alcohol and does not use drugs. Family History:   Family History   Family history unknown: Yes       Surgical History:   Past Surgical History:   Procedure Laterality Date    FEMUR FRACTURE SURGERY Right 7/29/2021    RIGHT FEMUR OPEN REDUCTION INTERNAL FIXATION performed by Indu Lewis MD at [facility]. Right 11/20/14    LEG SURGERY Right 07/18/2018    RIGHT TIBIA IM MARÍA -KATE KNEE TRIANGLES, INTRAMEDULLARY REAMERS, SYNTHES TIBIAL NAIL    [place] TREAT TIBIAL SHAFT FX, INTRAMED IMPLANT Right 7/18/2018    RIGHT TIBIA IM MARÍA -KATE KNEE TRIANGLES, INTRAMEDULLARY REAMERS, SYNTHES TIBIAL NAIL performed by Tavares Castillo DO at [facility] OR        Physical Examination      Vitals:  /60 (Site: Left Upper Arm, Position: Sitting, Cuff Size: Medium Adult)   Pulse 87   Temp 96.8 °F (36 °C) (Temporal)   Resp 16   Ht 5' (1.524 m)   Wt 150 lb (68 kg)   SpO2 96%   BMI 29.29 kg/m²     Physical Exam  Vitals and nursing note reviewed. Constitutional:       General: She is not in acute distress. Appearance: Normal appearance. She is overweight. She is not ill-appearing, toxic-appearing or diaphoretic. HENT:      Head: Normocephalic and atraumatic. Eyes:      General: No scleral icterus. Right eye: No discharge. Left eye: No discharge. Extraocular Movements: Extraocular movements intact. Conjunctiva/sclera: Conjunctivae normal.   Cardiovascular:      Rate and Rhythm: Normal rate and regular rhythm. Pulses: Normal pulses. Heart sounds: Normal heart sounds. No murmur heard. No friction rub. No gallop.     Pulmonary:      Effort: Pulmonary effort is normal. No respiratory distress. Breath sounds: Normal breath sounds. No stridor. No wheezing, rhonchi or rales. Chest:      Chest wall: No tenderness. Musculoskeletal:      Right lower leg: Edema present. Left lower leg: Edema present. Neurological:      Mental Status: She is alert and oriented to person, place, and time. Mental status is at baseline. Gait: Gait abnormal (in wheelchair). Psychiatric:         Attention and Perception: She is inattentive. Mood and Affect: Mood normal. Affect is blunt and flat. Speech: Speech is delayed. Behavior: Behavior is slowed. Thought Content: Thought content normal.         Judgment: Judgment normal.         Labs/Imaging/Diagnostics   Labs:  No results found for: CMPWITHGFR, CBCAUTODIF, TSHFT4, LABA1C, LIPIDPAN    Imaging Last 24 Hours:  VL Arterial PVR [facility]   Vascular Lower Arterial Plethysmography Procedure      Patient Name      Laci Levi Date of Study             03/11/2022      Date of Birth     1945  Gender                    Female      Age               77 year(s)  Race                            Room Number       OPT      Corporate ID #    [id number]      Patient Acct #    [de-identified]      MR #              [id number]     Sonographer               OmkarlolaLeanna      Accession #       [id number]  Interpreting Physician    Sol Waters      Referring Nurse               Referring Physician       Lidia Stiles   Practitioner     Procedure  Type of Study:      Extremities Arteries: Lower Arterial Plethysmography, PVR Lower. Indications for Study:Claudication. Patient Status:Out Patient. Technical Quality:Good visualization. Comments:Patient unable to flex feet. In wheel chair. Conclusions      Summary      Normal PVR at rest of the bilateral lower extremity. Normal arterial PPG waveforms of the bilateral digits.       Signature ----------------------------------------------------------------   Electronically signed by Leanna Castellanos(Sonographer) on   03/11/2022 02:15 PM   ----------------------------------------------------------------      ----------------------------------------------------------------   Electronically signed by Danielle PrattInterpreting physician)   on 03/12/2022 12:54 AM   ----------------------------------------------------------------     Velocities are measured in cm/s ; Diameters are measured in cm    Pressures  +--------------------------------------++--------+-----+----+--------+-----+  ! ! !Right   ! ! Left!        !     !  +--------------------------------------++--------+-----+----+--------+-----+  ! Location                              ! !Pressure! Ratio! !Pressure! Ratio! +--------------------------------------++--------+-----+----+--------+-----+  ! High Thigh                            !!207     !1.22 !    !219     !1.3  ! +--------------------------------------++--------+-----+----+--------+-----+  ! Low Thigh                             !!209     !1.24 !    !213     !1.26 !  +--------------------------------------++--------+-----+----+--------+-----+  ! Calf                                  !!202     !1.2  ! !211     !1.25 !  +--------------------------------------++--------+-----+----+--------+-----+  ! Ankle PT                              !!199     !1.18 !    !180     !1.07 !  +--------------------------------------++--------+-----+----+--------+-----+  ! Ankle DP                              !!187     !1.11 !    !164     !0.97 !  +--------------------------------------++--------+-----+----+--------+-----+  ! Great Toe                             !!133     !0.79 !    !129     !0.76 !  +--------------------------------------++--------+-----+----+--------+-----+      - Brachial Pressure:Right: 168. Left:169.      - DANIEL:Right: 1.18. Left: 1.07.     Plethysmographic Digit Evaluation  +---------++--------+-----+---------------++--------+-----+----------------+  ! ! !Right   ! ! Left           !!        !     !                !  +---------++--------+-----+---------------++--------+-----+----------------+  ! Location ! !Pressure! Ratio! PPG Wave Form  ! !Pressure! Ratio! PPG Wave Form   !  +---------++--------+-----+---------------++--------+-----+----------------+  ! Great KTB!!447     !0.79 !               !!129     !0.76 !                !   +---------++--------+-----+---------------++--------+-----+----------------+

## 2022-05-12 NOTE — TELEPHONE ENCOUNTER
Shaunna Rogers is calling to request a refill on the following medication(s):    Medication Request:  Requested Prescriptions     Pending Prescriptions Disp Refills    bumetanide (BUMEX) 1 MG tablet [Pharmacy Med Name: Bumetanide 1MG TABS] 28 tablet      Sig: TAKE 1 TABLET BY MOUTH DAILY    divalproex (DEPAKOTE) 500 MG DR tablet [Pharmacy Med Name: Divalproex Sodium 500MG TBEC] 112 tablet      Sig: TAKE 2 TABLETS BY MOUTH TWICE A DAY    divalproex (DEPAKOTE) 250 MG DR tablet [Pharmacy Med Name: Divalproex Sodium 250MG TBEC] 56 tablet      Sig: TAKE 1 TABLET BY MOUTH TWICE A DAY       Last Visit Date (If Applicable):  3/10/3198    Next Visit Date:    8/4/2022

## 2022-06-14 NOTE — ED PROVIDER NOTES
[address] [place] [address] ED  EMERGENCY DEPARTMENT ENCOUNTER   ATTENDING ATTESTATION     Pt Name: Barb Mock  MRN: [id number]  [place] 1945  Date of evaluation: 6/14/22       Barb Mock is a 69 y.o. female who presents with Emesis (Onset several hours ago; threw up several times; pt has hx of pocketing food in mouth; possible aspiration? ? ) and Other      MDM:   71-year-old female presents with concerns for aspiration, clinically the patient looks well, her laboratory studies and chest x-ray are unremarkable. She is been able to tolerate oral intake here she is not coughing or having any shortness of breath. Plan is outpatient follow-up with her PCP, strict instructions for her caregivers to watch her for signs of worsening shortness of breath and sepsis including cough shortness of breath fevers chills not tolerating oral intake or any other concerns they might have. Vitals:   Vitals:    06/14/22 1517   BP: (!) 142/100   Pulse: 74   Resp: 16   Temp: 97.5 °F (36.4 °C)   SpO2: 98%   Weight: 135 lb (61.2 kg)   Height: 5' (1.524 m)         This visit was performed by both a physician and an APC. I personally evaluated and examined the patient.  I performed all aspects of the MDM as documented     Hortensia Key MD  Attending Emergency  Physician                  Alexander Sinclair MD  06/14/22 [phone]

## 2022-06-14 NOTE — ED PROVIDER NOTES
[address] [place] [address] ED  eMERGENCY dEPARTMENT eNCOUnter      Pt Name: Odell Devlin  MRN: [id number]  [place] 1945  Date of evaluation: 6/14/2022  Provider: Samnatha WRAY PA-C    [address] [place] [address]       Chief Complaint   Patient presents with    Emesis     Onset several hours ago; threw up several times; pt has hx of pocketing food in mouth; possible aspiration? ?    Other         HISTORY OF PRESENT ILLNESS  (Location/Symptom, Timing/Onset, Context/Setting, Quality, Duration, Modifying Factors, Severity.)   Odell Devlin is a 69 y.o. female who presents to the emergency department brought in by caregiver for complaint of nausea and vomiting for the last 24 hours. Per caregiver patient has history of pocketing food in her cheeks and not swallowing everything at once. Yesterday this occurred and then patient had 3 episodes of emesis this morning. Caregiver states that patient complained of some abdominal discomfort. Patient was able to tolerate lunch prior to coming in and did not vomit. Patient has not had any fevers, shortness of breath or abnormal bowel movements. Nursing Notes were reviewed. ALLERGIES     Patient has no known allergies.     CURRENT MEDICATIONS       Previous Medications    ADHESIVE BANDAGES MISC    Use as directed    ALENDRONATE (FOSAMAX) 70 MG TABLET    Take 1 tablet by mouth every 7 days Indications: Sundays    ATORVASTATIN (LIPITOR) 40 MG TABLET    TAKE 1 TABLET BY MOUTH DAILY    BUMETANIDE (BUMEX) 1 MG TABLET    TAKE 1 TABLET BY MOUTH DAILY    CARBAMAZEPINE (CARBATROL) 200 MG EXTENDED RELEASE CAPSULE    TAKE 4 CAPSULES BY MOUTH TWICE A DAY    DIVALPROEX (DEPAKOTE) 250 MG DR TABLET    TAKE 1 TABLET BY MOUTH TWICE A DAY    DIVALPROEX (DEPAKOTE) 500 MG DR TABLET    TAKE 2 TABLETS BY MOUTH TWICE A DAY    NAPROXEN (NAPROSYN) 500 MG TABLET    TAKE 1 TABLET BY MOUTH TWICE A DAY WITH MEALS    STOOL SOFTENER 100 MG CAPSULE    TAKE 1 CAPSULE BY MOUTH TWICE A DAY AS NEEDED FOR CONSTIPATION    ZINC OXIDE (DESITIN) 40 % OINTMENT    Apply topically 2 times as needed to blistered and dry areas       PAST MEDICAL HISTORY         Diagnosis Date    Anxiety     Arthritis     Cataracts, bilateral     Essential (primary) hypertension 3/6/2019    Fracture (healed) treatment follow-up 2015    hand fx post fall    Mental retardation     Movement disorder     Primary osteoarthritis involving multiple joints 2/17/2017    Rheumat[facility] arthritis ([facility] [address]     Seizures ([facility] [address]     Urinary incontinence 1/12/2022       SURGICAL HISTORY           Procedure Laterality Date    FEMUR FRACTURE SURGERY Right 7/29/2021    RIGHT FEMUR OPEN REDUCTION INTERNAL FIXATION performed by Jermaine Duque MD at [facility]. Right 11/20/14    LEG SURGERY Right 07/18/2018    RIGHT TIBIA IM MARÍA -KATE KNEE TRIANGLES, INTRAMEDULLARY REAMERS, SYNTHES TIBIAL NAIL    [place] TREAT TIBIAL SHAFT FX, INTRAMED IMPLANT Right 7/18/2018    RIGHT TIBIA IM MARÍA -KATE KNEE TRIANGLES, INTRAMEDULLARY REAMERS, SYNTHES TIBIAL NAIL performed by Pieter Gaston DO at [place] [address]           Family history unknown: Yes     No family status information on file. SOCIAL HISTORY      reports that she has never smoked. She has never used smokeless tobacco. She reports that she does not drink alcohol and does not use drugs. REVIEW OF SYSTEMS    (2-9 systems for level 4, 10 or more for level 5)     Review of Systems   Constitutional: Negative for appetite change, chills, diaphoresis, fatigue, fever and unexpected weight change. Respiratory: Negative for cough, chest tightness, shortness of breath and wheezing. Cardiovascular: Negative for chest pain, palpitations and leg swelling. Gastrointestinal: Positive for nausea and vomiting. Negative for abdominal distention, abdominal pain, anal bleeding, blood in stool, constipation and diarrhea.    Genitourinary: Negative for decreased urine volume, difficulty urinating, dysuria, flank pain, frequency, hematuria and urgency. Musculoskeletal: Negative for back pain and myalgias. Neurological: Negative for dizziness, syncope, weakness, light-headedness and headaches. Except as noted above the remainder of the review of systems was reviewed and negative. PHYSICAL EXAM    (up to 7 for level 4, 8 or more for level 5)     ED Triage Vitals [06/14/22 1517]   BP Temp Temp src Heart Rate Resp SpO2 Height Weight   (!) 142/100 97.5 °F (36.4 °C) -- 74 16 98 % 5' (1.524 m) 135 lb (61.2 kg)       Physical Exam  Vitals and nursing note reviewed. Constitutional:       General: She is not in acute distress. Appearance: Normal appearance. She is well-developed. She is not ill-appearing, toxic-appearing or diaphoretic. HENT:      Head: Normocephalic and atraumatic. Eyes:      General: No scleral icterus. Right eye: No discharge. Left eye: No discharge. Conjunctiva/sclera: Conjunctivae normal.   Neck:      Thyroid: No thyroid mass, thyromegaly or thyroid tenderness. Cardiovascular:      Rate and Rhythm: Normal rate and regular rhythm. Heart sounds: Normal heart sounds. No murmur heard. No friction rub. No gallop. Pulmonary:      Effort: Pulmonary effort is normal. No respiratory distress. Breath sounds: Normal breath sounds. No stridor. No wheezing, rhonchi or rales. Chest:      Chest wall: No tenderness. Abdominal:      General: Abdomen is flat. Bowel sounds are normal.      Palpations: Abdomen is soft. Tenderness: There is no abdominal tenderness. Musculoskeletal:         General: No tenderness. Normal range of motion. Cervical back: Normal range of motion and neck supple. Skin:     General: Skin is warm and dry. Neurological:      General: No focal deficit present. Mental Status: She is alert and oriented to person, place, and time.    Psychiatric:         Attention and Perception: Attention and perception normal.         Mood and Affect: Mood and affect normal.         Speech: Speech normal.         Behavior: Behavior normal. Behavior is cooperative. Thought Content:  Thought content normal.         Cognition and Memory: Cognition and memory normal.         Judgment: Judgment normal.           DIAGNOSTIC RESULTS     EKG: All EKG's are interpreted by the Emergency Department Physician who either signs or Co-signs this chart in the absence of a cardiologist.    None indicated    RADIOLOGY:   Non-plain film images such as CT, Ultrasound and MRI are read by the radiologist. Plain radiographic images are visualized and preliminarily interpreted by the emergency physician with the below findings:    Chest x-ray shows a stable consolidation no acute findings    Interpretation per the Radiologist below, if available at the time of this note:        ED BEDSIDE ULTRASOUND:   Performed by ED Physician - none    LABS:  Results for orders placed or performed during the hospital encounter of 06/14/22   CBC with Auto Differential   Result Value Ref Range    WBC 4.4 3.5 - 11.3 k/uL    RBC 3.83 (L) 3.95 - 5.11 m/uL    Hemoglobin 12.3 11.9 - 15.1 g/dL    Hematocrit 39.9 36.3 - 47.1 %    .2 (H) 82.6 - 102.9 fL    MCH 32.1 25.2 - 33.5 pg    MCHC 30.8 28.4 - 34.8 g/dL    RDW 13.1 11.8 - 14.4 %    Platelets 071 (L) 507 - 453 k/uL    MPV 11.8 8.1 - 13.5 fL    NRBC Automated 0.0 0.0 per 100 WBC    RBC Morphology MACROCYTOSIS PRESENT     Seg Neutrophils 66 (H) 36 - 65 %    Lymphocytes 18 (L) 24 - 43 %    Monocytes 13 (H) 3 - 12 %    Eosinophils % 1 1 - 4 %    Basophils 1 0 - 2 %    Immature Granulocytes 2 (H) 0 %    Segs Absolute 2.91 1.50 - 8.10 k/uL    Absolute Lymph # 0.77 (L) 1.10 - 3.70 k/uL    Absolute Mono # 0.57 0.10 - 1.20 k/uL    Absolute Eos # <0.03 0.00 - 0.44 k/uL    Basophils Absolute 0.03 0.00 - 0.20 k/uL    Absolute Immature Granulocyte 0.09 0.00 - 0.30 k/uL   Comprehensive Metabolic Panel   Result Value Ref Range    Glucose 89 70 - 99 mg/dL    BUN 31 (H) 8 - 23 mg/dL    CREATININE 0.72 0.50 - 0.90 mg/dL    Bun/Cre Ratio 43 (H) 9 - 20    Calcium 8.5 (L) 8.6 - 10.4 mg/dL    Sodium 138 135 - 144 mmol/L    Potassium 3.8 3.7 - 5.3 mmol/L    Chloride 104 98 - 107 mmol/L    CO2 21 20 - 31 mmol/L    Anion Gap 13 9 - 17 mmol/L    Alkaline Phosphatase 171 (H) 35 - 104 U/L    ALT 28 5 - 33 U/L    AST 55 (H) <32 U/L    Total Bilirubin 0.36 0.3 - 1.2 mg/dL    Total Protein 6.1 (L) 6.4 - 8.3 g/dL    Albumin 2.8 (L) 3.5 - 5.2 g/dL    GFR Non-African American >60 >60 mL/min    GFR African American >60 >60 mL/min    GFR Comment         Lipase   Result Value Ref Range    Lipase 12 (L) 13 - 60 U/L   POC Glucose Fingerstick   Result Value Ref Range    POC Glucose 66 65 - 105 mg/dL       All other labs were within normal range or not returned as of this dictation. EMERGENCY DEPARTMENT COURSE and DIFFERENTIAL DIAGNOSIS/MDM:   Vitals:    Vitals:    06/14/22 1517   BP: (!) 142/100   Pulse: 74   Resp: 16   Temp: 97.5 °F (36.4 °C)   SpO2: 98%   Weight: 135 lb (61.2 kg)   Height: 5' (1.524 m)           Medical Decision Making patient is a 18-year-old female nonverbal brought in by caregiver. Work-up in the emergency room is normal except for mild thrombocytopenia which is noted previously as well. I discussed this with caregiver and instructed her to have patient follow-up with primary care doctor for any further evaluation. Patient was able to tolerate Jell-O in the emergency room without any vomiting. She had no difficulty swallowing or breathing. Vital signs are within normal limits. Signs and symptoms given to caregiver to look for in regards to bring the patient back for further evaluation and treatment. Caregiver states understanding and agrees with plan    CONSULTS:  None    PROCEDURES:  None    FINAL IMPRESSION      1.  Non-intractable vomiting with nausea, unspecified vomiting type          DISPOSITION/PLAN   DISPOSITION

## 2022-06-14 NOTE — TELEPHONE ENCOUNTER
Patient's caregiver, Leonard Paris, called stating that patient has been having increased memory loss. Her next follow up is scheduled for 11/16/2022. They would like to know if patient should be seen sooner. Please advise.

## 2022-06-20 NOTE — TELEPHONE ENCOUNTER
Have her undergo Head CT no contrats , depakote and tegretol level , CBC , ALT and ammonia level . Dx seizure disorder .  Memory loss

## 2022-07-07 NOTE — TELEPHONE ENCOUNTER
Sharyle Rm is calling to request a refill on the following medication(s):    Medication Request:  Requested Prescriptions     Pending Prescriptions Disp Refills    bumetanide (BUMEX) 1 MG tablet [Pharmacy Med Name: Bumetanide 1MG TABS] 28 tablet 1     Sig: TAKE 1 TABLET BY MOUTH DAILY       Last Visit Date (If Applicable):  5/29/0593    Next Visit Date:    8/3/2022

## 2022-08-03 PROBLEM — F03.90 DEMENTIA (HCC): Status: ACTIVE | Noted: 2022-01-01

## 2022-08-03 PROBLEM — S82.131A CLOSED FRACTURE OF MEDIAL PLATEAU OF RIGHT TIBIA: Status: ACTIVE | Noted: 2022-01-01

## 2022-08-03 PROBLEM — I73.9 PERIPHERAL VASCULAR DISEASE (HCC): Status: ACTIVE | Noted: 2022-01-01

## 2022-08-03 PROBLEM — R26.81 UNSTEADY GAIT: Status: ACTIVE | Noted: 2022-01-01

## 2022-08-03 NOTE — PROGRESS NOTES
[facility] Progress Note    Date:8/3/2022         Patient Arelis Salmon     YOB: 1945     Age:77 y.o. Assessment/Plan        Problem List Items Addressed This Visit          Circulatory    Essential (primary) hypertension      Well-controlled, continue current medications, continue current treatment plan, medication adherence emphasized and lifestyle modifications recommended            Nervous and Auditory    Seizure disorder (Chronic)      Monitored by specialist- no acute findings meriting change in the plan         Dementia ([address] - Primary      Monitored by specialist- no acute findings meriting change in the plan   Reviewed testing by neuro - urged to get office appt          Other Visit Diagnoses       Altered mental status, unspecified altered mental status type        Relevant Orders    Urinalysis with Reflex to Culture    Breast cancer screening by mammogram        Relevant Orders    [facility] DIGITAL SCREEN W OR WO CAD BILATERAL    At high risk for falls                 Return in about 3 months (around 11/3/2022). Electronically signed by Efe Leblanc DO on 8/3/22          Total time spent was between Time personally spent assessing and managing the patient on the date of service: Est: 30-39 minutes (14443) mins. This included time spent reviewing the patient's medical record (e.g., recent visits, labs, and studies); seeing the patient in the office (face-to-face time); ordering medications, studies, procedures, or referrals; calling the patient or family later in the day with results and further recommendations; and documenting the visit in the medical record. Leonor Crawford is a 69 y.o. female presenting today for   Chief Complaint   Patient presents with    Dementia     Possible dementia    . Has caretaker helping with HPI and ROS    Lalit Crawford is a 77 y.o. female who I am asked to see in consultation for evaluation osteoporosis.  She was diagnosed with osteoporosis by bone density scan. Patient has history of fracture. The cause of osteoporosis is felt to be due to postmenopausal estrogen deficiency. She is not currently being treated with calcium and vitamin D supplementation. She is currently being treated with bisphosphonates     Dementia and memory has gotten worse - now being combative at times - hasn't seen neuro/peres but he ordered imaging and tests that came back normal as per caretakers    Seizures  This is a chronic problem. The current episode started more than 1 year ago. The problem occurs intermittently. The problem has been unchanged. Pertinent negatives include no abdominal pain, arthralgias, chest pain, chills, congestion, coughing, diaphoresis, fatigue, fever, headaches, joint swelling, myalgias, nausea, neck pain, numbness, rash, sore throat, vomiting or weakness. Treatments tried: 2 meds - sees Dr. Shawn Arriaga neuro. Hypertension  This is a chronic problem. The current episode started more than 1 year ago. The problem has been resolved since onset. Associated symptoms include peripheral edema. Pertinent negatives include no anxiety, blurred vision, chest pain, headaches, malaise/fatigue, neck pain, orthopnea, palpitations, PND, shortness of breath or sweats. Past treatments include lifestyle changes. The current treatment provides significant improvement. Review of Systems   Review of Systems   Constitutional: Negative. Negative for chills, diaphoresis, fatigue, fever and malaise/fatigue. HENT: Negative. Negative for congestion and sore throat. Eyes: Negative. Negative for blurred vision. Respiratory: Negative. Negative for cough and shortness of breath. Cardiovascular: Negative. Negative for chest pain, palpitations, orthopnea and PND. Gastrointestinal: Negative. Negative for abdominal pain, nausea and vomiting. Endocrine: Negative. Genitourinary: Negative. Musculoskeletal: Negative.   Negative for arthralgias, joint swelling, myalgias and neck pain. Skin: Negative. Negative for rash. Allergic/Immunologic: Negative. Neurological:  Positive for seizures. Negative for weakness, numbness and headaches. Hematological: Negative. Psychiatric/Behavioral: Negative. All other systems reviewed and are negative. Medications     Current Outpatient Medications   Medication Sig Dispense Refill    bumetanide (BUMEX) 1 MG tablet TAKE 1 TABLET BY MOUTH DAILY 28 tablet 1    divalproex (DEPAKOTE) 500 MG DR tablet TAKE 2 TABLETS BY MOUTH TWICE A  tablet 5    divalproex (DEPAKOTE) 250 MG DR tablet TAKE 1 TABLET BY MOUTH TWICE A DAY 56 tablet 5    Adhesive Bandages MISC Use as directed 200 each 1    zinc oxide (DESITIN) 40 % ointment Apply topically 2 times as needed to blistered and dry areas 113 g 2    atorvastatin (LIPITOR) 40 MG tablet TAKE 1 TABLET BY MOUTH DAILY 30 tablet 5    carBAMazepine (CARBATROL) 200 MG extended release capsule TAKE 4 CAPSULES BY MOUTH TWICE A  capsule 5    naproxen (NAPROSYN) 500 MG tablet TAKE 1 TABLET BY MOUTH TWICE A DAY WITH MEALS 60 tablet 5    alendronate (FOSAMAX) 70 MG tablet Take 1 tablet by mouth every 7 days Indications: Sundays 25 tablet 0    STOOL SOFTENER 100 MG capsule TAKE 1 CAPSULE BY MOUTH TWICE A DAY AS NEEDED FOR CONSTIPATION 28 capsule 3     No current facility-administered medications for this visit. Past History    Past Medical History:   has a past medical history of Anxiety, Arthritis, Cataracts, bilateral, Essential (primary) hypertension, Fracture (healed) treatment follow-up, Mental retardation, Movement disorder, Peripheral vascular disease ([address] Primary osteoarthritis involving multiple joints, Rheumatoid arthritis ([address] Seizures ([address] and Urinary incontinence. Social History:   reports that she has never smoked. She has never used smokeless tobacco. She reports that she does not drink alcohol and does not use drugs.      Family History:   Family Speech: Speech is delayed. Behavior: Behavior is slowed. Thought Content: Thought content normal.         Judgment: Judgment normal.       Labs/Imaging/Diagnostics   Labs:  No results found for: CMPWITHGFR, CBCAUTODIF, TSHFT4, LABA1C, LIPIDPAN    Imaging Last 24 Hours:  CT HEAD WO CONTRAST  Narrative: EXAMINATION:  CT OF THE HEAD WITHOUT CONTRAST  6/24/2022 4:49 pm    TECHNIQUE:  CT of the head was performed without the administration of intravenous  contrast. Automated exposure control, iterative reconstruction, and/or weight  based adjustment of the mA/kV was utilized to reduce the radiation dose to as  low as reasonably achievable. COMPARISON:  07/28/2021    HISTORY:  ORDERING SYSTEM PROVIDED HISTORY: Seizure disorder [facility])  TECHNOLOGIST PROVIDED HISTORY:  Reason for Exam: pre op cabg    FINDINGS:  BRAIN/VENTRICLES: There is moderate generalized atrophy. There is no  evidence of acute hemorrhage, mass or extra-axial fluid collection. There is  minimal periventricular white matter low attenuation that is nonspecific but  most consistent with chronic small vessel ischemia. ORBITS: The visualized portion of the orbits demonstrate no acute abnormality. SINUSES: The visualized paranasal sinuses and mastoid air cells demonstrate  no acute abnormality. SOFT TISSUES/SKULL:  No acute abnormality of the visualized skull or soft  tissues. Impression: No acute intracranial abnormality. Moderate generalized atrophy.

## 2022-08-03 NOTE — TELEPHONE ENCOUNTER
Christopher Corona is calling to request a refill on the following medication(s):    Medication Request:  Requested Prescriptions     Pending Prescriptions Disp Refills    alendronate (FOSAMAX) 70 MG tablet [Pharmacy Med Name: Alendronate Sodium 70MG TABS] 4 tablet      Sig: TAKE 1 TABLET BY MOUTH EVERY 7 DAYS       Last Visit Date (If Applicable):  1/6/0952    Next Visit Date:    11/7/2022

## 2022-08-03 NOTE — ASSESSMENT & PLAN NOTE
Monitored by specialist- no acute findings meriting change in the plan   Reviewed testing by neuro - urged to get office appt

## 2022-08-11 NOTE — TELEPHONE ENCOUNTER
Patient  still having hard time swallow , the care giver was told  to call the office you will order swallow study ? Please tet the group home know? Thank you

## 2022-08-12 NOTE — TELEPHONE ENCOUNTER
3/20/2017    AUDIOLOGICAL EVALUATION:    Andrew Arthur was seen for an audiological evaluation on 3/20/2017 for decreased hearing in her right ear.  She reported a TM perforation on the right ear.      Pure tone threshold testing revealed a moderate conductive hearing loss for the right ear and normal hearing for the left ear.  Speech reception thresholds were obtained at 35dBHL for the right ear and 10dBHL for the left ear.  Speech discrimination scores were obtained at 100% for the right ear and 100% for the left ear.    Tympanometry revealed a large PVT for the right ear and Type A tympanogram for the left ear.    Recommend:  1.  Otologic evaluation.  2.  Follow up audio as needed to monitor hearing.           Swallow study ordered

## 2022-08-12 NOTE — TELEPHONE ENCOUNTER
Care giver would like chest X ray order . Patient been coughing up phlegm - Care giver would like to get it done at the same time her swallow study - please faxed it to [facility] .  Thank you

## 2022-09-01 NOTE — TELEPHONE ENCOUNTER
Jason Juan is calling to request a refill on the following medication(s):    Medication Request:  Requested Prescriptions     Pending Prescriptions Disp Refills    naproxen (NAPROSYN) 500 MG tablet [Pharmacy Med Name: Naproxen 500MG TABS] 56 tablet 1     Sig: TAKE 1 TABLET BY MOUTH TWICE A DAY WITH MEALS    carBAMazepine (CARBATROL) 200 MG extended release capsule [Pharmacy Med Name: carBAMazepine ER 200MG CP12*] 224 capsule 1     Sig: TAKE 4 CAPSULES BY MOUTH TWICE A DAY    atorvastatin (LIPITOR) 40 MG tablet [Pharmacy Med Name: Atorvastatin Calcium 40MG TABS] 28 tablet 1     Sig: TAKE 1 TABLET BY MOUTH DAILY    bumetanide (BUMEX) 1 MG tablet [Pharmacy Med Name: Bumetanide 1MG TABS] 28 tablet 1     Sig: TAKE 1 TABLET BY MOUTH DAILY       Last Visit Date (If Applicable):  1/9/9634    Next Visit Date:    11/7/2022

## 2022-09-20 NOTE — TELEPHONE ENCOUNTER
Patient caregiver calling stating patient has open wound in between buttocks. Asking for order for [facility] nursing [facility] wound care to come and take care of it .  Please advise

## 2022-09-21 NOTE — TELEPHONE ENCOUNTER
Pt caregiver calls to check status on order. Let her know it was still in process. She requests order to be faxed to [phone] when ready thank you.

## (undated) DEVICE — GOWN,AURORA,NONRNF,XL,30/CS: Brand: MEDLINE

## (undated) DEVICE — BANDAGE COMPR W4INXL5YD WHT BGE POLY COT M E WRP WV HK AND

## (undated) DEVICE — BIT DRL L300MM DIA4.3MM QUIK CPL PERC CALIB W/O STP REUSE

## (undated) DEVICE — APPLICATOR MEDICATED 26 CC SOLUTION HI LT ORNG CHLORAPREP

## (undated) DEVICE — GLOVE SURG SZ 7 L12IN FNGR THK87MIL WHT LTX FREE

## (undated) DEVICE — SUTURE VCRL SZ 0 L27IN ABSRB UD L36MM CP-1 1/2 CIR REV CUT J267H

## (undated) DEVICE — TOWEL,OR,DSP,ST,BLUE,STD,4/PK,20PK/CS: Brand: MEDLINE

## (undated) DEVICE — DISCONTINUED NO SUB IDED TG GLOVE SURG SENSICARE ALOE LT LF PF ST GRN SZ 8

## (undated) DEVICE — BIT DRL L330MM DIA3.2MM CALIB L100MM NONSTERILE 3 FLUT QUIK

## (undated) DEVICE — GARMENT,MEDLINE,DVT,INT,CALF,MED, GEN2: Brand: MEDLINE

## (undated) DEVICE — GLOVE SURG SZ 8 L12IN FNGR THK87MIL WHT LTX FREE

## (undated) DEVICE — C-ARMOR C-ARM EQUIPMENT COVERS CLEAR STERILE UNIVERSAL FIT 12 PER CASE: Brand: C-ARMOR

## (undated) DEVICE — TOTAL TRAY, DB, 100% SILI FOLEY, 16FR 10: Brand: MEDLINE

## (undated) DEVICE — GLOVE SURG SZ 65 L12IN FNGR THK87MIL WHT LTX FREE

## (undated) DEVICE — BANDAGE COBAN 4 IN COMPR W4INXL5YD FOAM COHESIVE QUIK STK SELF ADH SFT

## (undated) DEVICE — PREVENA INCISION MANAGEMENT SYSTEM- PEEL & PLACE DRESSING: Brand: PREVENA™ PEEL & PLACE™

## (undated) DEVICE — 3M™ IOBAN™ 2 ANTIMICROBIAL INCISE DRAPE 6650EZ: Brand: IOBAN™ 2

## (undated) DEVICE — PAD,ABDOMINAL,5"X9",ST,LF,25/BX: Brand: MEDLINE INDUSTRIES, INC.

## (undated) DEVICE — PADDING UNDERCAST W6INXL4YD WYTEX 6 PER BG

## (undated) DEVICE — K-WIRE, DOUBLE TROCAR POINT
Type: IMPLANTABLE DEVICE | Site: LEG | Status: NON-FUNCTIONAL
Removed: 2021-07-29

## (undated) DEVICE — SUTURE VCRL SZ 2-0 L27IN ABSRB UD L36MM CP-1 1/2 CIR REV J266H

## (undated) DEVICE — STOCKINETTE,IMPERVIOUS,12X48,STERILE: Brand: MEDLINE

## (undated) DEVICE — ROD RMR L950MM DIA2.5MM W/ EXTN BALL TIP

## (undated) DEVICE — YANKAUER,FLEXIBLE HANDLE,REGLR CAPACITY: Brand: MEDLINE INDUSTRIES, INC.

## (undated) DEVICE — DRAPE C ARM UNIV W41XL74IN CLR PLAS XR VELC CLSR POLY STRP

## (undated) DEVICE — Z DISCONTINUED USE 2275676 GLOVE SURG SZ 65 L12IN FNGR THK87MIL DK GRN LTX FREE ISOLEX

## (undated) DEVICE — Device

## (undated) DEVICE — GLOVE SURG SZ 75 L12IN FNGR THK87MIL DK GRN LTX FREE ISOLEX

## (undated) DEVICE — BLANKET WRM W29.9XL79.1IN UP BODY FORC AIR MISTRAL-AIR

## (undated) DEVICE — SUTURE ETHLN SZ 3-0 L18IN NONABSORBABLE BLK L24MM PS-1 3/8 1663G

## (undated) DEVICE — ZIMMER® STERILE DISPOSABLE TOURNIQUET CUFF WITH PLC, DUAL PORT, SINGLE BLADDER, 30 IN. (76 CM)

## (undated) DEVICE — INTENDED FOR TISSUE SEPARATION, AND OTHER PROCEDURES THAT REQUIRE A SHARP SURGICAL BLADE TO PUNCTURE OR CUT.: Brand: BARD-PARKER ® CARBON RIB-BACK BLADES

## (undated) DEVICE — C-ARM: Brand: UNBRANDED

## (undated) DEVICE — COVER LT HNDL BLU PLAS

## (undated) DEVICE — GLOVE SURG SZ 7 CRM LTX FREE POLYISOPRENE POLYMER BEAD ANTI

## (undated) DEVICE — CHLORAPREP 26ML ORANGE

## (undated) DEVICE — GUIDEWIRE ORTH L400MM DIA3.2MM FOR TFN

## (undated) DEVICE — BANDAGE COMPR W6INXL5YD WHT BGE POLY COT M E WRP WV HK AND

## (undated) DEVICE — GLOVE SURG SZ 85 L12IN FNGR THK13MIL WHT ISOLEX POLYISOPRENE

## (undated) DEVICE — TOWEL,OR,DSP,ST,BLUE,DLX,XR,4/PK,20PK/CS: Brand: MEDLINE

## (undated) DEVICE — TUBING, SUCTION, 1/4" X 12', STRAIGHT: Brand: MEDLINE

## (undated) DEVICE — IMPLANTABLE DEVICE
Type: IMPLANTABLE DEVICE | Site: LEG | Status: NON-FUNCTIONAL
Removed: 2021-07-29

## (undated) DEVICE — ADHESIVE SKIN CLSR 0.7ML TOP DERMBND ADV

## (undated) DEVICE — DRESSING TRNSPAR W5XL4.5IN FLM SHT SEMIPERMEABLE WIND

## (undated) DEVICE — GUIDEWIRE ORTH L300MM DIA2.5MM CO CHROM DRL TIP FOR LCP

## (undated) DEVICE — SPONGE LAP W18XL18IN WHT COT 4 PLY FLD STRUNG RADPQ DISP ST

## (undated) DEVICE — PENCIL ES L3M BTTN SWCH HOLSTER W/ BLDE ELECTRD EDGE

## (undated) DEVICE — POUCH INSTR W6.75XL11.5IN FRST 2 PKT ADH FOR ORTH AND

## (undated) DEVICE — PADDING,UNDERCAST,COTTON, 4"X4YD STERILE: Brand: MEDLINE

## (undated) DEVICE — BIT DRL L145MM DIA3.2MM 3 FLUT QUIK CPL FOR EXPERT TIB

## (undated) DEVICE — 3M™ WARMING BLANKET, UPPER BODY, 10 PER CASE, 42268: Brand: BAIR HUGGER™

## (undated) DEVICE — GLOVE SURG SZ 75 CRM LTX FREE POLYISOPRENE POLYMER BEAD ANTI